# Patient Record
Sex: MALE | Race: WHITE | NOT HISPANIC OR LATINO | Employment: FULL TIME | URBAN - METROPOLITAN AREA
[De-identification: names, ages, dates, MRNs, and addresses within clinical notes are randomized per-mention and may not be internally consistent; named-entity substitution may affect disease eponyms.]

---

## 2017-02-22 ENCOUNTER — REMOTE DEVICE CHECK (OUTPATIENT)
Dept: ELECTROPHYSIOLOGY | Age: 52
End: 2017-02-22

## 2017-02-22 DIAGNOSIS — I49.5 SINOATRIAL NODE DYSFUNCTION (CMD): Primary | ICD-10-CM

## 2017-02-22 PROCEDURE — 93296 REM INTERROG EVL PM/IDS: CPT | Performed by: INTERNAL MEDICINE

## 2017-02-22 PROCEDURE — 93294 REM INTERROG EVL PM/LDLS PM: CPT | Performed by: INTERNAL MEDICINE

## 2017-05-18 DIAGNOSIS — I49.5 SINOATRIAL NODE DYSFUNCTION (CMD): Primary | ICD-10-CM

## 2017-05-23 ENCOUNTER — OFFICE VISIT (OUTPATIENT)
Dept: CARDIOLOGY | Age: 52
End: 2017-05-23

## 2017-05-23 VITALS
WEIGHT: 195 LBS | DIASTOLIC BLOOD PRESSURE: 80 MMHG | HEART RATE: 65 BPM | SYSTOLIC BLOOD PRESSURE: 118 MMHG | HEIGHT: 66 IN | BODY MASS INDEX: 31.34 KG/M2

## 2017-05-23 DIAGNOSIS — I47.10 SVT (SUPRAVENTRICULAR TACHYCARDIA): ICD-10-CM

## 2017-05-23 DIAGNOSIS — I49.5 SINOATRIAL NODE DYSFUNCTION (CMD): Primary | ICD-10-CM

## 2017-05-23 DIAGNOSIS — R55 NEAR SYNCOPE: ICD-10-CM

## 2017-05-23 DIAGNOSIS — Z95.0 PACEMAKER: ICD-10-CM

## 2017-05-23 PROCEDURE — 93280 PM DEVICE PROGR EVAL DUAL: CPT | Performed by: INTERNAL MEDICINE

## 2017-05-23 PROCEDURE — 99213 OFFICE O/P EST LOW 20 MIN: CPT | Performed by: INTERNAL MEDICINE

## 2017-05-31 RX ORDER — ZOLPIDEM TARTRATE 10 MG/1
TABLET ORAL
Qty: 30 TABLET | Refills: 0 | Status: SHIPPED | OUTPATIENT
Start: 2017-05-31 | End: 2017-09-15 | Stop reason: SDUPTHER

## 2017-08-22 ENCOUNTER — REMOTE DEVICE CHECK (OUTPATIENT)
Dept: ELECTROPHYSIOLOGY | Age: 52
End: 2017-08-22

## 2017-08-22 DIAGNOSIS — I49.5 SINOATRIAL NODE DYSFUNCTION (CMD): Primary | ICD-10-CM

## 2017-08-22 PROCEDURE — 93294 REM INTERROG EVL PM/LDLS PM: CPT | Performed by: INTERNAL MEDICINE

## 2017-08-22 PROCEDURE — 93296 REM INTERROG EVL PM/IDS: CPT | Performed by: INTERNAL MEDICINE

## 2017-09-13 ENCOUNTER — REMOTE DEVICE CHECK (OUTPATIENT)
Dept: ELECTROPHYSIOLOGY | Age: 52
End: 2017-09-13

## 2017-09-13 ENCOUNTER — TELEPHONE (OUTPATIENT)
Dept: ELECTROPHYSIOLOGY | Age: 52
End: 2017-09-13

## 2017-09-13 DIAGNOSIS — I49.5 SINOATRIAL NODE DYSFUNCTION (CMD): ICD-10-CM

## 2017-09-13 PROCEDURE — 93296 REM INTERROG EVL PM/IDS: CPT | Performed by: INTERNAL MEDICINE

## 2017-09-13 PROCEDURE — 93294 REM INTERROG EVL PM/LDLS PM: CPT | Performed by: INTERNAL MEDICINE

## 2017-09-15 ENCOUNTER — OFFICE VISIT (OUTPATIENT)
Dept: FAMILY MEDICINE | Age: 52
End: 2017-09-15

## 2017-09-15 ENCOUNTER — LAB SERVICES (OUTPATIENT)
Dept: LAB | Age: 52
End: 2017-09-15

## 2017-09-15 VITALS
TEMPERATURE: 97.7 F | BODY MASS INDEX: 32.14 KG/M2 | WEIGHT: 200 LBS | HEIGHT: 66 IN | RESPIRATION RATE: 16 BRPM | HEART RATE: 64 BPM | DIASTOLIC BLOOD PRESSURE: 82 MMHG | SYSTOLIC BLOOD PRESSURE: 124 MMHG

## 2017-09-15 DIAGNOSIS — R55 NEAR SYNCOPE: ICD-10-CM

## 2017-09-15 DIAGNOSIS — R55 SYNCOPE AND COLLAPSE: ICD-10-CM

## 2017-09-15 DIAGNOSIS — Z12.5 PROSTATE CANCER SCREENING: ICD-10-CM

## 2017-09-15 DIAGNOSIS — K64.4 HEMORRHOIDS, EXTERNAL WITHOUT COMPLICATIONS: ICD-10-CM

## 2017-09-15 DIAGNOSIS — I49.5 SINOATRIAL NODE DYSFUNCTION (CMD): ICD-10-CM

## 2017-09-15 DIAGNOSIS — F51.01 PRIMARY INSOMNIA: ICD-10-CM

## 2017-09-15 DIAGNOSIS — I47.10 SVT (SUPRAVENTRICULAR TACHYCARDIA): ICD-10-CM

## 2017-09-15 DIAGNOSIS — Z00.00 GENERAL MEDICAL EXAMINATION: Primary | ICD-10-CM

## 2017-09-15 LAB
ALBUMIN SERPL-MCNC: 3.8 G/DL (ref 3.6–5.1)
ALBUMIN/GLOB SERPL: 1.2 {RATIO} (ref 1–2.4)
ALP SERPL-CCNC: 97 UNITS/L (ref 45–117)
ALT SERPL-CCNC: 57 UNITS/L
ANION GAP SERPL CALC-SCNC: 11 MMOL/L (ref 10–20)
AST SERPL-CCNC: 35 UNITS/L
BILIRUB SERPL-MCNC: 1.3 MG/DL (ref 0.2–1)
BUN SERPL-MCNC: 13 MG/DL (ref 6–20)
BUN/CREAT SERPL: 13 (ref 7–25)
CALCIUM SERPL-MCNC: 8.9 MG/DL (ref 8.4–10.2)
CHLORIDE SERPL-SCNC: 102 MMOL/L (ref 98–107)
CHOLEST SERPL-MCNC: 223 MG/DL
CHOLEST/HDLC SERPL: 5.2 {RATIO}
CO2 SERPL-SCNC: 26 MMOL/L (ref 21–32)
CREAT SERPL-MCNC: 0.96 MG/DL (ref 0.67–1.17)
FASTING STATUS PATIENT QL REPORTED: 12 HRS
GLOBULIN SER-MCNC: 3.3 G/DL (ref 2–4)
GLUCOSE SERPL-MCNC: 95 MG/DL (ref 65–99)
HDLC SERPL-MCNC: 43 MG/DL
LDLC SERPL-MCNC: 147 MG/DL
LENGTH OF FAST TIME PATIENT: 12 HRS
NONHDLC SERPL-MCNC: 180 MG/DL
POTASSIUM SERPL-SCNC: 4.1 MMOL/L (ref 3.4–5.1)
PROT SERPL-MCNC: 7.1 G/DL (ref 6.4–8.2)
PSA SERPL-MCNC: 2.4 NG/ML
SODIUM SERPL-SCNC: 135 MMOL/L (ref 135–145)
TRIGL SERPL-MCNC: 166 MG/DL

## 2017-09-15 PROCEDURE — 36415 COLL VENOUS BLD VENIPUNCTURE: CPT | Performed by: INTERNAL MEDICINE

## 2017-09-15 PROCEDURE — 99396 PREV VISIT EST AGE 40-64: CPT | Performed by: PHYSICIAN ASSISTANT

## 2017-09-15 PROCEDURE — 80061 LIPID PANEL: CPT | Performed by: INTERNAL MEDICINE

## 2017-09-15 PROCEDURE — 85025 COMPLETE CBC W/AUTO DIFF WBC: CPT | Performed by: INTERNAL MEDICINE

## 2017-09-15 PROCEDURE — 84153 ASSAY OF PSA TOTAL: CPT | Performed by: INTERNAL MEDICINE

## 2017-09-15 PROCEDURE — 80053 COMPREHEN METABOLIC PANEL: CPT | Performed by: INTERNAL MEDICINE

## 2017-09-15 RX ORDER — METOPROLOL SUCCINATE 25 MG/1
25 TABLET, EXTENDED RELEASE ORAL DAILY
Qty: 90 TABLET | Refills: 3 | Status: SHIPPED | OUTPATIENT
Start: 2017-09-15 | End: 2018-03-22 | Stop reason: SDUPTHER

## 2017-09-15 RX ORDER — SILDENAFIL 100 MG/1
100 TABLET, FILM COATED ORAL PRN
Qty: 30 TABLET | Refills: 5 | Status: SHIPPED | OUTPATIENT
Start: 2017-09-15 | End: 2018-09-24 | Stop reason: SDUPTHER

## 2017-09-15 RX ORDER — ZOLPIDEM TARTRATE 10 MG/1
10 TABLET ORAL NIGHTLY
Qty: 30 TABLET | Refills: 5 | Status: SHIPPED | OUTPATIENT
Start: 2017-09-15 | End: 2018-06-23 | Stop reason: SDUPTHER

## 2017-09-15 RX ORDER — CARISOPRODOL 350 MG/1
TABLET ORAL
Qty: 60 TABLET | Refills: 3 | Status: SHIPPED | OUTPATIENT
Start: 2017-09-15 | End: 2018-01-24 | Stop reason: SDUPTHER

## 2017-09-15 RX ORDER — FLUTICASONE PROPIONATE 50 MCG
2 SPRAY, SUSPENSION (ML) NASAL DAILY
Qty: 16 G | Refills: 12 | Status: SHIPPED | OUTPATIENT
Start: 2017-09-15 | End: 2020-06-16 | Stop reason: ALTCHOICE

## 2017-09-15 ASSESSMENT — ENCOUNTER SYMPTOMS
CHILLS: 0
DIAPHORESIS: 0
DIZZINESS: 0
FEVER: 0
SHORTNESS OF BREATH: 0
DIARRHEA: 0
SLEEP DISTURBANCE: 0
WHEEZING: 0
VOMITING: 0
HEADACHES: 1
COUGH: 0
RHINORRHEA: 1
SINUS PRESSURE: 0
SORE THROAT: 0
PHOTOPHOBIA: 0
NAUSEA: 0
FATIGUE: 0

## 2017-09-16 LAB
BASOPHILS # BLD AUTO: 0 K/MCL (ref 0–0.3)
BASOPHILS NFR BLD AUTO: 1 %
DIFFERENTIAL METHOD BLD: NORMAL
EOSINOPHIL # BLD AUTO: 0.1 K/MCL (ref 0.1–0.5)
EOSINOPHIL NFR SPEC: 1 %
ERYTHROCYTE [DISTWIDTH] IN BLOOD: 13.1 % (ref 11–15)
HCT VFR BLD CALC: 48.6 % (ref 39–51)
HGB BLD-MCNC: 16.5 G/DL (ref 13–17)
LYMPHOCYTES # BLD MANUAL: 1.4 K/MCL (ref 1–4)
LYMPHOCYTES NFR BLD MANUAL: 24 %
MCH RBC QN AUTO: 30.9 PG (ref 26–34)
MCHC RBC AUTO-ENTMCNC: 34 G/DL (ref 32–36.5)
MCV RBC AUTO: 91 FL (ref 78–100)
MONOCYTES # BLD MANUAL: 0.5 K/MCL (ref 0.3–0.9)
MONOCYTES NFR BLD MANUAL: 8 %
NEUTROPHILS # BLD: 3.8 K/MCL (ref 1.8–7.7)
NEUTROPHILS NFR BLD AUTO: 66 %
PLATELET # BLD: 216 K/MCL (ref 140–450)
RBC # BLD: 5.34 MIL/MCL (ref 4.5–5.9)
WBC # BLD: 5.7 K/MCL (ref 4.2–11)

## 2017-10-03 ENCOUNTER — OFFICE VISIT (OUTPATIENT)
Dept: FAMILY MEDICINE | Age: 52
End: 2017-10-03

## 2017-10-03 VITALS
HEART RATE: 64 BPM | HEIGHT: 66 IN | OXYGEN SATURATION: 97 % | RESPIRATION RATE: 20 BRPM | SYSTOLIC BLOOD PRESSURE: 112 MMHG | TEMPERATURE: 97.9 F | WEIGHT: 199.96 LBS | BODY MASS INDEX: 32.14 KG/M2 | DIASTOLIC BLOOD PRESSURE: 80 MMHG

## 2017-10-03 DIAGNOSIS — R05.9 COUGH: ICD-10-CM

## 2017-10-03 DIAGNOSIS — J02.9 PHARYNGITIS, UNSPECIFIED ETIOLOGY: Primary | ICD-10-CM

## 2017-10-03 LAB — S PYO AG THROAT QL IA.RAPID: NEGATIVE

## 2017-10-03 PROCEDURE — 99213 OFFICE O/P EST LOW 20 MIN: CPT | Performed by: PHYSICIAN ASSISTANT

## 2017-10-03 PROCEDURE — 87880 STREP A ASSAY W/OPTIC: CPT | Performed by: PHYSICIAN ASSISTANT

## 2017-10-03 RX ORDER — AZITHROMYCIN 250 MG/1
TABLET, FILM COATED ORAL
Qty: 6 TABLET | Refills: 0 | Status: SHIPPED | OUTPATIENT
Start: 2017-10-03 | End: 2017-10-08

## 2017-10-03 RX ORDER — CODEINE PHOSPHATE AND GUAIFENESIN 10; 100 MG/5ML; MG/5ML
SOLUTION ORAL
Qty: 240 ML | Refills: 0 | Status: SHIPPED | OUTPATIENT
Start: 2017-10-03 | End: 2018-04-01

## 2017-10-03 ASSESSMENT — ENCOUNTER SYMPTOMS
COUGH: 1
SHORTNESS OF BREATH: 0
RHINORRHEA: 1
CHILLS: 0
WHEEZING: 0
FEVER: 0
DIAPHORESIS: 0
SORE THROAT: 1
SINUS PAIN: 1
FATIGUE: 0

## 2017-11-28 ENCOUNTER — OFFICE VISIT (OUTPATIENT)
Dept: CARDIOLOGY | Age: 52
End: 2017-11-28

## 2017-11-28 VITALS
SYSTOLIC BLOOD PRESSURE: 124 MMHG | BODY MASS INDEX: 32.14 KG/M2 | DIASTOLIC BLOOD PRESSURE: 68 MMHG | WEIGHT: 200 LBS | HEIGHT: 66 IN | HEART RATE: 74 BPM

## 2017-11-28 DIAGNOSIS — I47.10 SVT (SUPRAVENTRICULAR TACHYCARDIA): ICD-10-CM

## 2017-11-28 DIAGNOSIS — I49.5 SINOATRIAL NODE DYSFUNCTION (CMD): Primary | ICD-10-CM

## 2017-11-28 DIAGNOSIS — Z95.0 PACEMAKER: ICD-10-CM

## 2017-11-28 DIAGNOSIS — R55 NEAR SYNCOPE: ICD-10-CM

## 2017-11-28 PROCEDURE — 93280 PM DEVICE PROGR EVAL DUAL: CPT | Performed by: INTERNAL MEDICINE

## 2017-11-28 PROCEDURE — 99213 OFFICE O/P EST LOW 20 MIN: CPT | Performed by: INTERNAL MEDICINE

## 2017-11-29 DIAGNOSIS — R55 NEAR SYNCOPE: ICD-10-CM

## 2017-11-29 RX ORDER — METOPROLOL SUCCINATE 25 MG/1
25 TABLET, EXTENDED RELEASE ORAL DAILY
Qty: 90 TABLET | Refills: 1 | Status: SHIPPED | OUTPATIENT
Start: 2017-11-29 | End: 2019-02-12 | Stop reason: SDUPTHER

## 2018-01-01 ENCOUNTER — EXTERNAL RECORD (OUTPATIENT)
Dept: OTHER | Age: 53
End: 2018-01-01

## 2018-01-25 RX ORDER — CARISOPRODOL 350 MG/1
TABLET ORAL
Qty: 60 TABLET | Refills: 0 | Status: SHIPPED | OUTPATIENT
Start: 2018-01-25 | End: 2018-09-24 | Stop reason: SDUPTHER

## 2018-03-02 ENCOUNTER — REMOTE DEVICE CHECK (OUTPATIENT)
Dept: ELECTROPHYSIOLOGY | Age: 53
End: 2018-03-02

## 2018-03-02 DIAGNOSIS — I49.5 SINOATRIAL NODE DYSFUNCTION (CMD): ICD-10-CM

## 2018-03-02 PROCEDURE — 93296 REM INTERROG EVL PM/IDS: CPT | Performed by: INTERNAL MEDICINE

## 2018-03-02 PROCEDURE — 93294 REM INTERROG EVL PM/LDLS PM: CPT | Performed by: INTERNAL MEDICINE

## 2018-03-22 ENCOUNTER — OFFICE VISIT (OUTPATIENT)
Dept: FAMILY MEDICINE | Age: 53
End: 2018-03-22

## 2018-03-22 VITALS
TEMPERATURE: 97.4 F | BODY MASS INDEX: 31.53 KG/M2 | RESPIRATION RATE: 16 BRPM | HEIGHT: 66 IN | SYSTOLIC BLOOD PRESSURE: 126 MMHG | HEART RATE: 76 BPM | DIASTOLIC BLOOD PRESSURE: 80 MMHG | WEIGHT: 196.21 LBS

## 2018-03-22 DIAGNOSIS — I49.5 SINOATRIAL NODE DYSFUNCTION (CMD): ICD-10-CM

## 2018-03-22 DIAGNOSIS — J01.00 ACUTE MAXILLARY SINUSITIS, RECURRENCE NOT SPECIFIED: Primary | ICD-10-CM

## 2018-03-22 DIAGNOSIS — I47.10 SVT (SUPRAVENTRICULAR TACHYCARDIA): ICD-10-CM

## 2018-03-22 DIAGNOSIS — G47.33 OSA ON CPAP: ICD-10-CM

## 2018-03-22 DIAGNOSIS — Z95.0 PACEMAKER: ICD-10-CM

## 2018-03-22 DIAGNOSIS — M79.2 NEURITIS: ICD-10-CM

## 2018-03-22 PROCEDURE — 99213 OFFICE O/P EST LOW 20 MIN: CPT | Performed by: PHYSICIAN ASSISTANT

## 2018-03-22 RX ORDER — CEFDINIR 300 MG/1
300 CAPSULE ORAL 2 TIMES DAILY
Qty: 20 CAPSULE | Refills: 0 | Status: SHIPPED | OUTPATIENT
Start: 2018-03-22 | End: 2018-09-24 | Stop reason: ALTCHOICE

## 2018-03-22 ASSESSMENT — ENCOUNTER SYMPTOMS
WHEEZING: 0
DIAPHORESIS: 0
HEADACHES: 1
SHORTNESS OF BREATH: 0
SORE THROAT: 1
SINUS PRESSURE: 1
RHINORRHEA: 1
FEVER: 0
CHILLS: 0
BACK PAIN: 1
FATIGUE: 1
SINUS PAIN: 1
COUGH: 1
NUMBNESS: 1

## 2018-03-22 ASSESSMENT — PATIENT HEALTH QUESTIONNAIRE - PHQ9
SUM OF ALL RESPONSES TO PHQ9 QUESTIONS 1 AND 2: 0
CLINICAL INTERPRETATION OF PHQ2 SCORE: 0

## 2018-04-04 RX ORDER — FLUTICASONE PROPIONATE 50 MCG
SPRAY, SUSPENSION (ML) NASAL
Qty: 16 ML | Refills: 3 | Status: SHIPPED | OUTPATIENT
Start: 2018-04-04 | End: 2018-09-24 | Stop reason: SDUPTHER

## 2018-04-19 ENCOUNTER — OFFICE VISIT (OUTPATIENT)
Dept: PULMONOLOGY | Age: 53
End: 2018-04-19
Attending: PHYSICIAN ASSISTANT

## 2018-04-19 VITALS
RESPIRATION RATE: 16 BRPM | OXYGEN SATURATION: 100 % | SYSTOLIC BLOOD PRESSURE: 126 MMHG | HEIGHT: 65 IN | HEART RATE: 70 BPM | BODY MASS INDEX: 33.17 KG/M2 | DIASTOLIC BLOOD PRESSURE: 80 MMHG | WEIGHT: 199.08 LBS

## 2018-04-19 DIAGNOSIS — G47.31 COMPLEX SLEEP APNEA SYNDROME: Primary | ICD-10-CM

## 2018-04-19 PROBLEM — G47.39 COMPLEX SLEEP APNEA SYNDROME: Status: ACTIVE | Noted: 2018-04-19

## 2018-04-19 PROCEDURE — 99244 OFF/OP CNSLTJ NEW/EST MOD 40: CPT | Performed by: INTERNAL MEDICINE

## 2018-05-08 ENCOUNTER — HOSPITAL ENCOUNTER (OUTPATIENT)
Dept: CARDIOLOGY | Age: 53
Discharge: HOME | End: 2018-05-08
Attending: INTERNAL MEDICINE

## 2018-05-08 DIAGNOSIS — G47.31 COMPLEX SLEEP APNEA SYNDROME: ICD-10-CM

## 2018-05-08 LAB
AORTIC VALVE AREA: 3 CM2
ASCENDING AORTA (AAD): 2.8 CM
AV MEAN GRADIENT (AVMG): 2.4 MMHG
AV PEAK GRADIENT (AVPG): 6.2 MMHG
AV PEAK VELOCITY (AVPV): 1.2 M/S
DOP CALC LVOT PEAK VEL (LVOTPV): 1.2 M/S
E WAVE DECELARATION TIME (MDT): 177.4 MS
INTERVENTRICULAR SEPTUM IN END DIASTOLE (IVSD): 1.2 CM
LEFT INTERNAL DIMENSION IN SYSTOLE (LVSD): 2.8 CM
LEFT VENTRICULAR INTERNAL DIMENSION IN DIASTOLE (LVDD): 4.3 CM
LEFT VENTRICULAR POSTERIOR WALL IN END DIASTOLE (LVPW): 0.9 CM
LV EF: 61 PERCENT
LVOT 2D (LVOTD): 2 CM
LVOT VTI (LVOTVTI): 23.2 CM
MV E TISSUE VEL LAT (MELV): 9.4 CM/S
MV E TISSUE VEL MED (MESV): 6.3 CM/S
MV E WAVE VEL/E TISSUE VEL MED(MSR): 15.2
MV PEAK A VELOCITY (MVPAV): 0.8 M/S
MV PEAK E VELOCITY (MVPEV): 1 M/S
RV TISSUE DOPPLER FREE WALL HEART RATE (RVSTV): 0.1 M/S
TRICUSPID VALVE PEAK REGURGITATION VELOCITY (TRPV): 2.2 M/S

## 2018-05-08 PROCEDURE — 93306 TTE W/DOPPLER COMPLETE: CPT

## 2018-05-08 PROCEDURE — 93306 TTE W/DOPPLER COMPLETE: CPT | Performed by: INTERNAL MEDICINE

## 2018-05-15 ENCOUNTER — TELEPHONE (OUTPATIENT)
Dept: PULMONOLOGY | Age: 53
End: 2018-05-15

## 2018-05-29 ENCOUNTER — TELEPHONE (OUTPATIENT)
Dept: CARDIOLOGY | Age: 53
End: 2018-05-29

## 2018-05-29 ENCOUNTER — OFFICE VISIT (OUTPATIENT)
Dept: CARDIOLOGY | Age: 53
End: 2018-05-29

## 2018-05-29 VITALS
BODY MASS INDEX: 31.34 KG/M2 | HEIGHT: 66 IN | HEART RATE: 95 BPM | DIASTOLIC BLOOD PRESSURE: 80 MMHG | WEIGHT: 195 LBS | SYSTOLIC BLOOD PRESSURE: 128 MMHG

## 2018-05-29 DIAGNOSIS — I49.5 SINOATRIAL NODE DYSFUNCTION (CMD): Primary | ICD-10-CM

## 2018-05-29 DIAGNOSIS — R55 NEAR SYNCOPE: ICD-10-CM

## 2018-05-29 DIAGNOSIS — Z95.0 PACEMAKER: ICD-10-CM

## 2018-05-29 DIAGNOSIS — I47.10 SVT (SUPRAVENTRICULAR TACHYCARDIA): ICD-10-CM

## 2018-05-29 PROCEDURE — 93280 PM DEVICE PROGR EVAL DUAL: CPT | Performed by: INTERNAL MEDICINE

## 2018-06-08 ENCOUNTER — TELEPHONE (OUTPATIENT)
Dept: PULMONOLOGY | Age: 53
End: 2018-06-08

## 2018-06-08 ENCOUNTER — TELEPHONE (OUTPATIENT)
Dept: SLEEP MEDICINE | Age: 53
End: 2018-06-08

## 2018-06-22 ENCOUNTER — HOSPITAL ENCOUNTER (OUTPATIENT)
Dept: SLEEP MEDICINE | Age: 53
Discharge: STILL ADMITTED OR ADMINISTRATIVE | End: 2018-06-22
Attending: INTERNAL MEDICINE

## 2018-06-22 DIAGNOSIS — G47.31 COMPLEX SLEEP APNEA SYNDROME: ICD-10-CM

## 2018-06-22 PROCEDURE — 95811 POLYSOM 6/>YRS CPAP 4/> PARM: CPT

## 2018-06-22 PROCEDURE — 95811 POLYSOM 6/>YRS CPAP 4/> PARM: CPT | Performed by: INTERNAL MEDICINE

## 2018-06-23 VITALS — WEIGHT: 195 LBS | HEIGHT: 65 IN | BODY MASS INDEX: 32.49 KG/M2

## 2018-06-23 ASSESSMENT — SLEEP AND FATIGUE QUESTIONNAIRES
WHAT TIME DID YOU WAKE UP TODAY: 36000
DID YOU HAVE ANY PHYSICAL EXERCISE TODAY: NO
DID YOU DREAM LAST NIGHT: NO
HOW LIKELY ARE YOU TO NOD OFF OR FALL ASLEEP WHILE SITTING QUIETLY AFTER LUNCH WITHOUT ALCOHOL: SLIGHT CHANCE OF DOZING
WHEN DID YOU FEEL SLEEPY: 5 PM
DID YOU HAVE DIFFICULTY FALLING BACK TO SLEEP: YES
DID YOU TAKE ANY MEDICATIONS LAST NIGHT THAT MAKE YOU FEEL SLEEPY: YES
HOW LIKELY ARE YOU TO NOD OFF OR FALL ASLEEP WHILE SITTING INACTIVE IN A PUBLIC PLACE: SLIGHT CHANCE OF DOZING
DID YOU HAVE DIFFICULTY FALLING ASLEEP LAST NIGHT: NO
HOW DEEPLY DID YOU SLEEP LAST NIGHT: DEEP
HOW DOES YOUR SLEEP LAST NIGHT COMPARE TO YOUR USUAL SLEEP AT HOME: SAME AS
HOW LIKELY ARE YOU TO NOD OFF OR FALL ASLEEP WHEN YOU ARE A PASSENGER IN A CAR FOR AN HOUR WITHOUT A BREAK: SLIGHT CHANCE OF DOZING
DESCRIBE HOW YOU FEEL RIGHT NOW: FEELING ACTIVE AND VITAL, ALERT, WIDE-AWAKE
DO YOU HAVE ANY PHYSICAL COMPLAINTS RIGHT NOW: NO
HAVE YOU RECENTLY TAKEN ANY MEDICATIONS THAT MAKE YOU FEEL SLEEPY: NO
HOW LONG DID IT TAKE TO FALL ASLEEP LAST NIGHT (HRS/MIN): 2
DESCRIBE HOW YOU FEEL RIGHT NOW: FUNCTIONING AT A HIGH LEVEL, BUT NOT AT PEAK, ABLE TO CONCENTRATE
IS YOUR NOSE OR MOUTH DRY: NO
DO YOU HAVE A SOUR/BITTER TASTE IN YOUR MOUTH: YES
HOW MUCH SLEEP DID YOU HAVE LAST NIGHT (HRS/MIN): 5 HOURS
DID YOU DRINK ANY ALCOHOL TODAY: NO
HOW LIKELY ARE YOU TO NOD OFF OR FALL ASLEEP WHILE SITTING AND READING: MODERATE CHANCE OF DOZING
ESS TOTAL SCORE: 11
DID YOU HAVE ANY CAFFEINE TODAY: NO
HOW DOES THIS COMPARE TO YOUR USUAL AMOUNT OF SLEEP AT HOME: SAME AS
NUMBER OF TIMES YOU WOKE UP IN THE NIGHT: 5
HOW LONG DO YOU THINK YOU WERE ASLEEP (HRS/MIN): 6 HOURS
HOW SLEEPY DO YOU FEEL RIGHT NOW: A LITTLE
HOW LIKELY ARE YOU TO NOD OFF OR FALL ASLEEP WHILE SITTING AND TALKING TO SOMEONE: SLIGHT CHANCE OF DOZING
IF YOU WERE PLACED ON CPAP LAST NIGHT, HOW DID YOUR SLEEP DIFFER FROM YOUR USUAL NIGHTS SLEEP: BETTER
HAS TODAY BEEN AN UNUSUAL DAY IN ANY RESPECT: NO
DO YOU FEEL AWAKE AND ALERT ENOUGH TO DRIVE HOME: YES
HOW LIKELY ARE YOU TO NOD OFF OR FALL ASLEEP WHILE LYING DOWN TO REST IN THE AFTERNOON WHEN CIRCUMSTANCES PERMIT: HIGH CHANCE OF DOZING
IF YES, NAME OF MEDICATION: AMBIEN
DID YOU FEEL SLEEPY TODAY: YES
HOW LIKELY ARE YOU TO NOD OFF OR FALL ASLEEP IN A CAR, WHILE STOPPED FOR A FEW MINUTES IN TRAFFIC: WOULD NEVER DOZE
DID YOU TAKE A NAP TODAY: NO
DID YOU WAKE UP DURING THE NIGHT: YES
HOW LIKELY ARE YOU TO NOD OFF OR FALL ASLEEP WHILE WATCHING TV: MODERATE CHANCE OF DOZING

## 2018-06-25 ENCOUNTER — TELEPHONE (OUTPATIENT)
Dept: PULMONOLOGY | Age: 53
End: 2018-06-25

## 2018-06-25 DIAGNOSIS — G47.31 CENTRAL SLEEP APNEA: Primary | ICD-10-CM

## 2018-06-25 RX ORDER — ZOLPIDEM TARTRATE 10 MG/1
10 TABLET ORAL NIGHTLY
Qty: 30 TABLET | Refills: 2 | Status: SHIPPED | OUTPATIENT
Start: 2018-06-25 | End: 2018-09-24 | Stop reason: SDUPTHER

## 2018-08-06 ENCOUNTER — REMOTE DEVICE CHECK (OUTPATIENT)
Dept: ELECTROPHYSIOLOGY | Age: 53
End: 2018-08-06
Attending: INTERNAL MEDICINE

## 2018-08-06 DIAGNOSIS — I49.5 SINOATRIAL NODE DYSFUNCTION (CMD): ICD-10-CM

## 2018-08-06 PROCEDURE — 93296 REM INTERROG EVL PM/IDS: CPT | Performed by: INTERNAL MEDICINE

## 2018-08-06 PROCEDURE — 93294 REM INTERROG EVL PM/LDLS PM: CPT | Performed by: INTERNAL MEDICINE

## 2018-08-26 ENCOUNTER — HOSPITAL ENCOUNTER (OUTPATIENT)
Dept: SLEEP MEDICINE | Age: 53
Discharge: STILL ADMITTED OR ADMINISTRATIVE | End: 2018-08-26
Attending: INTERNAL MEDICINE

## 2018-08-26 DIAGNOSIS — G47.31 CENTRAL SLEEP APNEA: ICD-10-CM

## 2018-08-26 PROCEDURE — 95811 POLYSOM 6/>YRS CPAP 4/> PARM: CPT

## 2018-08-26 PROCEDURE — 95811 POLYSOM 6/>YRS CPAP 4/> PARM: CPT | Performed by: INTERNAL MEDICINE

## 2018-08-27 ENCOUNTER — TELEPHONE (OUTPATIENT)
Dept: PULMONOLOGY | Age: 53
End: 2018-08-27

## 2018-08-27 VITALS — HEIGHT: 65 IN | BODY MASS INDEX: 32.49 KG/M2 | WEIGHT: 195 LBS

## 2018-08-27 DIAGNOSIS — G47.31 COMPLEX SLEEP APNEA SYNDROME: Primary | ICD-10-CM

## 2018-08-27 ASSESSMENT — SLEEP AND FATIGUE QUESTIONNAIRES
HOW LIKELY ARE YOU TO NOD OFF OR FALL ASLEEP WHILE WATCHING TV: MODERATE CHANCE OF DOZING
DESCRIBE HOW YOU FEEL RIGHT NOW: FEELING ACTIVE AND VITAL, ALERT, WIDE-AWAKE
DO YOU FEEL AWAKE AND ALERT ENOUGH TO DRIVE HOME: YES
DID YOU DREAM LAST NIGHT: YES
HOW LONG DID IT TAKE TO FALL ASLEEP LAST NIGHT (HRS/MIN): 5
DO YOU HAVE A SOUR/BITTER TASTE IN YOUR MOUTH: NO
NUMBER OF TIMES YOU WOKE UP IN THE NIGHT: 4
DID YOU TAKE A NAP TODAY: NO
DID YOU WAKE UP DURING THE NIGHT: YES
HOW MUCH SLEEP DID YOU HAVE LAST NIGHT (HRS/MIN): 6 HOURS
HOW DEEPLY DID YOU SLEEP LAST NIGHT: AVERAGE
HOW SLEEPY DO YOU FEEL RIGHT NOW: A LITTLE
HOW LONG DO YOU THINK YOU WERE ASLEEP (HRS/MIN): 7 HOURS
HAS TODAY BEEN AN UNUSUAL DAY IN ANY RESPECT: NO
HOW DOES THIS COMPARE TO YOUR USUAL AMOUNT OF SLEEP AT HOME: LONGER THAN
DID YOU HAVE DIFFICULTY FALLING BACK TO SLEEP: NO
WHAT TIME DID YOU WAKE UP TODAY: 14400
HOW LIKELY ARE YOU TO NOD OFF OR FALL ASLEEP WHILE SITTING AND TALKING TO SOMEONE: SLIGHT CHANCE OF DOZING
HAVE YOU RECENTLY TAKEN ANY MEDICATIONS THAT MAKE YOU FEEL SLEEPY: YES
HOW LIKELY ARE YOU TO NOD OFF OR FALL ASLEEP WHILE SITTING AND READING: MODERATE CHANCE OF DOZING
DESCRIBE HOW YOU FEEL RIGHT NOW: FUNCTIONING AT A HIGH LEVEL, BUT NOT AT PEAK, ABLE TO CONCENTRATE
HOW LIKELY ARE YOU TO NOD OFF OR FALL ASLEEP WHEN YOU ARE A PASSENGER IN A CAR FOR AN HOUR WITHOUT A BREAK: SLIGHT CHANCE OF DOZING
HOW DOES YOUR SLEEP LAST NIGHT COMPARE TO YOUR USUAL SLEEP AT HOME: BETTER THAN
DID YOU FEEL SLEEPY TODAY: NO
HOW LIKELY ARE YOU TO NOD OFF OR FALL ASLEEP WHILE LYING DOWN TO REST IN THE AFTERNOON WHEN CIRCUMSTANCES PERMIT: HIGH CHANCE OF DOZING
DID YOU HAVE ANY PHYSICAL EXERCISE TODAY: NO
DID YOU HAVE DIFFICULTY FALLING ASLEEP LAST NIGHT: NO
DID YOU TAKE ANY MEDICATIONS LAST NIGHT THAT MAKE YOU FEEL SLEEPY: YES
DO YOU HAVE ANY PHYSICAL COMPLAINTS RIGHT NOW: NO
DID YOU HAVE ANY CAFFEINE TODAY: YES
IF YOU WERE PLACED ON CPAP LAST NIGHT, HOW DID YOUR SLEEP DIFFER FROM YOUR USUAL NIGHTS SLEEP: BETTER
IS YOUR NOSE OR MOUTH DRY: NO
HOW LIKELY ARE YOU TO NOD OFF OR FALL ASLEEP WHILE SITTING QUIETLY AFTER LUNCH WITHOUT ALCOHOL: MODERATE CHANCE OF DOZING
HOW LIKELY ARE YOU TO NOD OFF OR FALL ASLEEP WHILE SITTING INACTIVE IN A PUBLIC PLACE: MODERATE CHANCE OF DOZING
WHAT TIME DID YOU HAVE THE CAFFEINE: 16200
ESS TOTAL SCORE: 13
HOW LIKELY ARE YOU TO NOD OFF OR FALL ASLEEP IN A CAR, WHILE STOPPED FOR A FEW MINUTES IN TRAFFIC: WOULD NEVER DOZE
DID YOU DRINK ANY ALCOHOL TODAY: NO
HOW MUCH CAFFEINE DID YOU HAVE: 6 OZ

## 2018-08-27 ASSESSMENT — ACTIVITIES OF DAILY LIVING (ADL)
ADL_SCORE: 12
ADL_BEFORE_ADMISSION: INDEPENDENT

## 2018-08-28 ENCOUNTER — TELEPHONE (OUTPATIENT)
Dept: PULMONOLOGY | Age: 53
End: 2018-08-28

## 2018-08-28 DIAGNOSIS — G47.31 COMPLEX SLEEP APNEA SYNDROME: Primary | ICD-10-CM

## 2018-09-24 ENCOUNTER — OFFICE VISIT (OUTPATIENT)
Dept: FAMILY MEDICINE | Age: 53
End: 2018-09-24

## 2018-09-24 VITALS
RESPIRATION RATE: 16 BRPM | BODY MASS INDEX: 33.06 KG/M2 | DIASTOLIC BLOOD PRESSURE: 80 MMHG | HEART RATE: 76 BPM | HEIGHT: 65 IN | WEIGHT: 198.41 LBS | SYSTOLIC BLOOD PRESSURE: 128 MMHG

## 2018-09-24 DIAGNOSIS — J01.00 ACUTE MAXILLARY SINUSITIS, RECURRENCE NOT SPECIFIED: Primary | ICD-10-CM

## 2018-09-24 DIAGNOSIS — Z95.0 PACEMAKER: ICD-10-CM

## 2018-09-24 DIAGNOSIS — F51.04 CHRONIC INSOMNIA: ICD-10-CM

## 2018-09-24 DIAGNOSIS — I47.10 SVT (SUPRAVENTRICULAR TACHYCARDIA): ICD-10-CM

## 2018-09-24 DIAGNOSIS — G47.31 COMPLEX SLEEP APNEA SYNDROME: ICD-10-CM

## 2018-09-24 DIAGNOSIS — I49.5 SINOATRIAL NODE DYSFUNCTION (CMD): ICD-10-CM

## 2018-09-24 PROCEDURE — 99213 OFFICE O/P EST LOW 20 MIN: CPT | Performed by: PHYSICIAN ASSISTANT

## 2018-09-24 RX ORDER — CEFDINIR 300 MG/1
300 CAPSULE ORAL 2 TIMES DAILY
Qty: 20 CAPSULE | Refills: 0 | Status: SHIPPED | OUTPATIENT
Start: 2018-09-24 | End: 2018-11-27

## 2018-09-24 RX ORDER — FLUTICASONE PROPIONATE 50 MCG
2 SPRAY, SUSPENSION (ML) NASAL DAILY
Qty: 16 ML | Refills: 3 | Status: SHIPPED | OUTPATIENT
Start: 2018-09-24 | End: 2019-03-13 | Stop reason: SDUPTHER

## 2018-09-24 RX ORDER — SILDENAFIL 100 MG/1
100 TABLET, FILM COATED ORAL PRN
Qty: 30 TABLET | Refills: 5 | Status: SHIPPED | OUTPATIENT
Start: 2018-09-24 | End: 2019-12-10 | Stop reason: SDUPTHER

## 2018-09-24 RX ORDER — ZOLPIDEM TARTRATE 10 MG/1
10 TABLET ORAL NIGHTLY
Qty: 30 TABLET | Refills: 5 | Status: SHIPPED | OUTPATIENT
Start: 2018-09-24 | End: 2019-05-03 | Stop reason: SDUPTHER

## 2018-09-24 RX ORDER — CARISOPRODOL 350 MG/1
350 TABLET ORAL 3 TIMES DAILY PRN
Qty: 60 TABLET | Refills: 3 | Status: SHIPPED | OUTPATIENT
Start: 2018-09-24 | End: 2019-12-10 | Stop reason: SDUPTHER

## 2018-09-24 ASSESSMENT — ENCOUNTER SYMPTOMS
CHILLS: 0
DIAPHORESIS: 0
SINUS PRESSURE: 1
SORE THROAT: 0
FATIGUE: 0
HEADACHES: 0
WHEEZING: 0
FEVER: 0
COUGH: 0
RHINORRHEA: 1

## 2018-11-19 ENCOUNTER — OFFICE VISIT (OUTPATIENT)
Dept: PULMONOLOGY | Age: 53
End: 2018-11-19

## 2018-11-19 VITALS
OXYGEN SATURATION: 98 % | DIASTOLIC BLOOD PRESSURE: 78 MMHG | RESPIRATION RATE: 16 BRPM | SYSTOLIC BLOOD PRESSURE: 130 MMHG | HEIGHT: 65 IN | HEART RATE: 70 BPM | BODY MASS INDEX: 33.68 KG/M2 | WEIGHT: 202.16 LBS

## 2018-11-19 DIAGNOSIS — G47.31 COMPLEX SLEEP APNEA SYNDROME: Primary | ICD-10-CM

## 2018-11-19 PROCEDURE — 99213 OFFICE O/P EST LOW 20 MIN: CPT | Performed by: INTERNAL MEDICINE

## 2018-11-27 ENCOUNTER — OFFICE VISIT (OUTPATIENT)
Dept: CARDIOLOGY | Age: 53
End: 2018-11-27

## 2018-11-27 VITALS
HEIGHT: 65 IN | HEART RATE: 70 BPM | WEIGHT: 200 LBS | SYSTOLIC BLOOD PRESSURE: 120 MMHG | DIASTOLIC BLOOD PRESSURE: 78 MMHG | BODY MASS INDEX: 33.32 KG/M2

## 2018-11-27 DIAGNOSIS — I49.5 SINOATRIAL NODE DYSFUNCTION (CMD): Primary | ICD-10-CM

## 2018-11-27 DIAGNOSIS — Z95.0 PACEMAKER: ICD-10-CM

## 2018-11-27 PROCEDURE — 93280 PM DEVICE PROGR EVAL DUAL: CPT | Performed by: INTERNAL MEDICINE

## 2019-01-01 ENCOUNTER — EXTERNAL RECORD (OUTPATIENT)
Dept: OTHER | Age: 54
End: 2019-01-01

## 2019-01-10 ENCOUNTER — HOSPITAL ENCOUNTER (OUTPATIENT)
Dept: OTHER | Facility: HOSPITAL | Age: 54
Discharge: HOME | End: 2019-01-10
Attending: FAMILY MEDICINE

## 2019-01-10 ENCOUNTER — OFFICE VISIT CONVERTED (OUTPATIENT)
Dept: FAMILY MEDICINE CLINIC | Age: 54
End: 2019-01-10
Attending: FAMILY MEDICINE

## 2019-01-10 LAB
ALBUMIN SERPL-MCNC: 4.8 G/DL (ref 3.5–5)
ALBUMIN/GLOB SERPL: 2 {RATIO} (ref 1.4–2.6)
ALP SERPL-CCNC: 77 U/L (ref 56–119)
ALT SERPL-CCNC: 46 U/L (ref 10–40)
ANION GAP SERPL CALC-SCNC: 24 MMOL/L (ref 8–19)
AST SERPL-CCNC: 36 U/L (ref 15–50)
BILIRUB SERPL-MCNC: 0.71 MG/DL (ref 0.2–1.3)
BUN SERPL-MCNC: 9 MG/DL (ref 5–25)
BUN/CREAT SERPL: 9 {RATIO} (ref 6–20)
CALCIUM SERPL-MCNC: 9.6 MG/DL (ref 8.7–10.4)
CHLORIDE SERPL-SCNC: 101 MMOL/L (ref 99–111)
CHOLEST SERPL-MCNC: 171 MG/DL (ref 107–200)
CHOLEST/HDLC SERPL: 4.4 {RATIO} (ref 3–6)
CONV CO2: 22 MMOL/L (ref 22–32)
CONV TOTAL PROTEIN: 7.2 G/DL (ref 6.3–8.2)
CREAT UR-MCNC: 0.99 MG/DL (ref 0.7–1.2)
EST. AVERAGE GLUCOSE BLD GHB EST-MCNC: 140 MG/DL
GFR SERPLBLD BASED ON 1.73 SQ M-ARVRAT: >60 ML/MIN/{1.73_M2}
GLOBULIN UR ELPH-MCNC: 2.4 G/DL (ref 2–3.5)
GLUCOSE SERPL-MCNC: 103 MG/DL (ref 70–99)
HBA1C MFR BLD: 6.5 % (ref 3.5–5.7)
HDLC SERPL-MCNC: 39 MG/DL (ref 40–60)
LDLC SERPL CALC-MCNC: 114 MG/DL (ref 70–100)
OSMOLALITY SERPL CALC.SUM OF ELEC: 293 MOSM/KG (ref 273–304)
POTASSIUM SERPL-SCNC: 4.5 MMOL/L (ref 3.5–5.3)
PSA SERPL-MCNC: 0.53 NG/ML (ref 0–4)
SODIUM SERPL-SCNC: 142 MMOL/L (ref 135–147)
TRIGL SERPL-MCNC: 91 MG/DL (ref 40–150)
TSH SERPL-ACNC: 1.96 M[IU]/L (ref 0.27–4.2)
VLDLC SERPL-MCNC: 18 MG/DL (ref 5–37)

## 2019-01-17 ENCOUNTER — HOSPITAL ENCOUNTER (OUTPATIENT)
Dept: OTHER | Facility: HOSPITAL | Age: 54
Discharge: HOME | End: 2019-01-17
Attending: FAMILY MEDICINE

## 2019-02-12 DIAGNOSIS — R55 NEAR SYNCOPE: ICD-10-CM

## 2019-02-15 RX ORDER — METOPROLOL SUCCINATE 25 MG/1
25 TABLET, EXTENDED RELEASE ORAL DAILY
Qty: 90 TABLET | Refills: 2 | OUTPATIENT
Start: 2019-02-15

## 2019-02-15 RX ORDER — METOPROLOL SUCCINATE 25 MG/1
TABLET, EXTENDED RELEASE ORAL
Qty: 90 TABLET | Refills: 0 | Status: SHIPPED | OUTPATIENT
Start: 2019-02-15 | End: 2019-05-08 | Stop reason: SDUPTHER

## 2019-02-26 ENCOUNTER — REMOTE DEVICE CHECK (OUTPATIENT)
Dept: ELECTROPHYSIOLOGY | Age: 54
End: 2019-02-26

## 2019-02-26 DIAGNOSIS — I49.5 SINOATRIAL NODE DYSFUNCTION (CMD): Primary | ICD-10-CM

## 2019-02-26 PROCEDURE — 93296 REM INTERROG EVL PM/IDS: CPT | Performed by: INTERNAL MEDICINE

## 2019-02-26 PROCEDURE — 93294 REM INTERROG EVL PM/LDLS PM: CPT | Performed by: INTERNAL MEDICINE

## 2019-02-27 ENCOUNTER — TELEPHONE (OUTPATIENT)
Dept: ELECTROPHYSIOLOGY | Age: 54
End: 2019-02-27

## 2019-03-15 RX ORDER — FLUTICASONE PROPIONATE 50 MCG
SPRAY, SUSPENSION (ML) NASAL
Qty: 16 ML | Refills: 3 | Status: SHIPPED | OUTPATIENT
Start: 2019-03-15 | End: 2019-12-10 | Stop reason: SDUPTHER

## 2019-05-03 ENCOUNTER — LAB SERVICES (OUTPATIENT)
Dept: LAB | Age: 54
End: 2019-05-03

## 2019-05-03 ENCOUNTER — OFFICE VISIT (OUTPATIENT)
Dept: FAMILY MEDICINE | Age: 54
End: 2019-05-03

## 2019-05-03 VITALS
BODY MASS INDEX: 33.65 KG/M2 | SYSTOLIC BLOOD PRESSURE: 122 MMHG | TEMPERATURE: 97.6 F | HEIGHT: 65 IN | DIASTOLIC BLOOD PRESSURE: 86 MMHG | RESPIRATION RATE: 18 BRPM | WEIGHT: 201.94 LBS | OXYGEN SATURATION: 96 % | HEART RATE: 61 BPM

## 2019-05-03 DIAGNOSIS — I49.5 SINOATRIAL NODE DYSFUNCTION (CMD): ICD-10-CM

## 2019-05-03 DIAGNOSIS — Z13.220 LIPID SCREENING: ICD-10-CM

## 2019-05-03 DIAGNOSIS — I47.10 SVT (SUPRAVENTRICULAR TACHYCARDIA): ICD-10-CM

## 2019-05-03 DIAGNOSIS — Z95.0 PACEMAKER: ICD-10-CM

## 2019-05-03 DIAGNOSIS — F51.01 PRIMARY INSOMNIA: ICD-10-CM

## 2019-05-03 DIAGNOSIS — G47.31 COMPLEX SLEEP APNEA SYNDROME: ICD-10-CM

## 2019-05-03 DIAGNOSIS — Z00.00 GENERAL MEDICAL EXAM: Primary | ICD-10-CM

## 2019-05-03 DIAGNOSIS — J30.2 SEASONAL ALLERGIES: ICD-10-CM

## 2019-05-03 DIAGNOSIS — C61 PROSTATE CANCER (CMD): ICD-10-CM

## 2019-05-03 PROCEDURE — 84153 ASSAY OF PSA TOTAL: CPT | Performed by: INTERNAL MEDICINE

## 2019-05-03 PROCEDURE — 99396 PREV VISIT EST AGE 40-64: CPT | Performed by: PHYSICIAN ASSISTANT

## 2019-05-03 PROCEDURE — 36415 COLL VENOUS BLD VENIPUNCTURE: CPT | Performed by: INTERNAL MEDICINE

## 2019-05-03 PROCEDURE — 80053 COMPREHEN METABOLIC PANEL: CPT | Performed by: INTERNAL MEDICINE

## 2019-05-03 PROCEDURE — 80061 LIPID PANEL: CPT | Performed by: INTERNAL MEDICINE

## 2019-05-03 PROCEDURE — 85025 COMPLETE CBC W/AUTO DIFF WBC: CPT | Performed by: INTERNAL MEDICINE

## 2019-05-03 RX ORDER — ZOLPIDEM TARTRATE 10 MG/1
10 TABLET ORAL NIGHTLY
Qty: 90 TABLET | Refills: 1 | Status: SHIPPED | OUTPATIENT
Start: 2019-05-03 | End: 2019-12-10 | Stop reason: SDUPTHER

## 2019-05-03 ASSESSMENT — ENCOUNTER SYMPTOMS
SINUS PRESSURE: 1
FEVER: 0
HEADACHES: 0
DIAPHORESIS: 0
CHILLS: 0
NAUSEA: 0
WHEEZING: 0
SHORTNESS OF BREATH: 0
BLOOD IN STOOL: 0
FATIGUE: 0
DIZZINESS: 0
COUGH: 0
SLEEP DISTURBANCE: 0
DIARRHEA: 0
VOMITING: 0

## 2019-05-03 ASSESSMENT — PATIENT HEALTH QUESTIONNAIRE - PHQ9
SUM OF ALL RESPONSES TO PHQ9 QUESTIONS 1 AND 2: 0
2. FEELING DOWN, DEPRESSED OR HOPELESS: NOT AT ALL
SUM OF ALL RESPONSES TO PHQ9 QUESTIONS 1 AND 2: 0
1. LITTLE INTEREST OR PLEASURE IN DOING THINGS: NOT AT ALL

## 2019-05-04 LAB
ALBUMIN SERPL-MCNC: 4 G/DL (ref 3.6–5.1)
ALBUMIN/GLOB SERPL: 1.3 {RATIO} (ref 1–2.4)
ALP SERPL-CCNC: 104 UNITS/L (ref 45–117)
ALT SERPL-CCNC: 39 UNITS/L
ANION GAP SERPL CALC-SCNC: 8 MMOL/L (ref 10–20)
AST SERPL-CCNC: 21 UNITS/L
BASOPHILS # BLD AUTO: 0 K/MCL (ref 0–0.3)
BASOPHILS NFR BLD AUTO: 1 %
BILIRUB SERPL-MCNC: 1.4 MG/DL (ref 0.2–1)
BUN SERPL-MCNC: 8 MG/DL (ref 6–20)
BUN/CREAT SERPL: 8 (ref 7–25)
CALCIUM SERPL-MCNC: 9.3 MG/DL (ref 8.4–10.2)
CHLORIDE SERPL-SCNC: 104 MMOL/L (ref 98–107)
CHOLEST SERPL-MCNC: 217 MG/DL
CHOLEST/HDLC SERPL: 4.6 {RATIO}
CO2 SERPL-SCNC: 31 MMOL/L (ref 21–32)
CREAT SERPL-MCNC: 0.95 MG/DL (ref 0.67–1.17)
DIFFERENTIAL METHOD BLD: ABNORMAL
EOSINOPHIL # BLD AUTO: 0.1 K/MCL (ref 0.1–0.5)
EOSINOPHIL NFR SPEC: 1 %
ERYTHROCYTE [DISTWIDTH] IN BLOOD: 13 % (ref 11–15)
FASTING STATUS PATIENT QL REPORTED: 14 HRS
GLOBULIN SER-MCNC: 3.1 G/DL (ref 2–4)
GLUCOSE SERPL-MCNC: 94 MG/DL (ref 65–99)
HCT VFR BLD CALC: 51.5 % (ref 39–51)
HDLC SERPL-MCNC: 47 MG/DL
HGB BLD-MCNC: 16.7 G/DL (ref 13–17)
IMM GRANULOCYTES # BLD AUTO: 0 K/MCL (ref 0–0.2)
IMM GRANULOCYTES NFR BLD: 1 %
LDLC SERPL-MCNC: 144 MG/DL
LENGTH OF FAST TIME PATIENT: 14 HRS
LYMPHOCYTES # BLD MANUAL: 1.1 K/MCL (ref 1–4)
LYMPHOCYTES NFR BLD MANUAL: 22 %
MCH RBC QN AUTO: 30.3 PG (ref 26–34)
MCHC RBC AUTO-ENTMCNC: 32.4 G/DL (ref 32–36.5)
MCV RBC AUTO: 93.3 FL (ref 78–100)
MONOCYTES # BLD MANUAL: 0.5 K/MCL (ref 0.3–0.9)
MONOCYTES NFR BLD MANUAL: 9 %
NEUTROPHILS # BLD: 3.5 K/MCL (ref 1.8–7.7)
NEUTROPHILS NFR BLD AUTO: 66 %
NONHDLC SERPL-MCNC: 170 MG/DL
NRBC BLD MANUAL-RTO: 0 /100 WBC
PLATELET # BLD: 232 K/MCL (ref 140–450)
POTASSIUM SERPL-SCNC: 4.4 MMOL/L (ref 3.4–5.1)
PROT SERPL-MCNC: 7.1 G/DL (ref 6.4–8.2)
PSA SERPL-MCNC: 2.3 NG/ML
RBC # BLD: 5.52 MIL/MCL (ref 4.5–5.9)
SODIUM SERPL-SCNC: 139 MMOL/L (ref 135–145)
TRIGL SERPL-MCNC: 128 MG/DL
WBC # BLD: 5.2 K/MCL (ref 4.2–11)

## 2019-05-07 ENCOUNTER — TELEPHONE (OUTPATIENT)
Dept: FAMILY MEDICINE | Age: 54
End: 2019-05-07

## 2019-05-08 DIAGNOSIS — R55 NEAR SYNCOPE: ICD-10-CM

## 2019-05-09 RX ORDER — METOPROLOL SUCCINATE 25 MG/1
TABLET, EXTENDED RELEASE ORAL
Qty: 90 TABLET | Refills: 1 | Status: SHIPPED | OUTPATIENT
Start: 2019-05-09 | End: 2019-11-09 | Stop reason: SDUPTHER

## 2019-05-28 ENCOUNTER — OFFICE VISIT (OUTPATIENT)
Dept: CARDIOLOGY | Age: 54
End: 2019-05-28

## 2019-05-28 ENCOUNTER — TELEPHONE (OUTPATIENT)
Dept: CARDIOLOGY | Age: 54
End: 2019-05-28

## 2019-05-28 VITALS
HEIGHT: 65 IN | DIASTOLIC BLOOD PRESSURE: 80 MMHG | BODY MASS INDEX: 32.99 KG/M2 | SYSTOLIC BLOOD PRESSURE: 128 MMHG | WEIGHT: 198 LBS | HEART RATE: 72 BPM

## 2019-05-28 DIAGNOSIS — I47.10 SVT (SUPRAVENTRICULAR TACHYCARDIA): ICD-10-CM

## 2019-05-28 DIAGNOSIS — I49.5 SINOATRIAL NODE DYSFUNCTION (CMD): Primary | ICD-10-CM

## 2019-05-28 DIAGNOSIS — R55 NEAR SYNCOPE: ICD-10-CM

## 2019-05-28 DIAGNOSIS — Z95.0 PACEMAKER: ICD-10-CM

## 2019-05-28 PROCEDURE — 93280 PM DEVICE PROGR EVAL DUAL: CPT | Performed by: INTERNAL MEDICINE

## 2019-06-11 ENCOUNTER — APPOINTMENT (OUTPATIENT)
Dept: CARDIOLOGY | Age: 54
End: 2019-06-11

## 2019-08-30 ENCOUNTER — TELEPHONE (OUTPATIENT)
Dept: ELECTROPHYSIOLOGY | Age: 54
End: 2019-08-30

## 2019-08-30 ENCOUNTER — APPOINTMENT (OUTPATIENT)
Dept: ELECTROPHYSIOLOGY | Age: 54
End: 2019-08-30

## 2019-09-04 ENCOUNTER — REMOTE DEVICE CHECK (OUTPATIENT)
Dept: ELECTROPHYSIOLOGY | Age: 54
End: 2019-09-04

## 2019-09-04 DIAGNOSIS — I49.5 SINOATRIAL NODE DYSFUNCTION (CMD): Primary | ICD-10-CM

## 2019-09-04 PROCEDURE — 93294 REM INTERROG EVL PM/LDLS PM: CPT | Performed by: INTERNAL MEDICINE

## 2019-09-04 PROCEDURE — 93296 REM INTERROG EVL PM/IDS: CPT | Performed by: INTERNAL MEDICINE

## 2019-09-10 ENCOUNTER — APPOINTMENT (OUTPATIENT)
Dept: ELECTROPHYSIOLOGY | Age: 54
End: 2019-09-10

## 2019-10-29 ENCOUNTER — OFFICE VISIT (OUTPATIENT)
Dept: PULMONOLOGY | Age: 54
End: 2019-10-29

## 2019-10-29 VITALS
HEIGHT: 65 IN | BODY MASS INDEX: 33.55 KG/M2 | RESPIRATION RATE: 16 BRPM | DIASTOLIC BLOOD PRESSURE: 80 MMHG | HEART RATE: 70 BPM | SYSTOLIC BLOOD PRESSURE: 124 MMHG | WEIGHT: 201.39 LBS | OXYGEN SATURATION: 98 %

## 2019-10-29 DIAGNOSIS — G47.31 COMPLEX SLEEP APNEA SYNDROME: Primary | ICD-10-CM

## 2019-10-29 DIAGNOSIS — F51.04 PSYCHOPHYSIOLOGICAL INSOMNIA: ICD-10-CM

## 2019-10-29 PROCEDURE — 99214 OFFICE O/P EST MOD 30 MIN: CPT | Performed by: INTERNAL MEDICINE

## 2019-10-29 RX ORDER — IPRATROPIUM BROMIDE 42 UG/1
SPRAY, METERED NASAL
COMMUNITY
Start: 2019-10-28 | End: 2020-06-16 | Stop reason: ALTCHOICE

## 2019-10-29 RX ORDER — ESZOPICLONE 2 MG/1
2 TABLET, FILM COATED ORAL
Qty: 30 TABLET | Refills: 0 | Status: SHIPPED | OUTPATIENT
Start: 2019-10-29 | End: 2019-12-10 | Stop reason: ALTCHOICE

## 2019-11-09 DIAGNOSIS — R55 NEAR SYNCOPE: ICD-10-CM

## 2019-11-11 RX ORDER — METOPROLOL SUCCINATE 25 MG/1
25 TABLET, EXTENDED RELEASE ORAL DAILY
Qty: 30 TABLET | Refills: 0 | Status: SHIPPED | OUTPATIENT
Start: 2019-11-11 | End: 2019-12-05 | Stop reason: SDUPTHER

## 2019-12-05 DIAGNOSIS — R55 NEAR SYNCOPE: ICD-10-CM

## 2019-12-05 RX ORDER — METOPROLOL SUCCINATE 25 MG/1
25 TABLET, EXTENDED RELEASE ORAL DAILY
Qty: 30 TABLET | Refills: 0 | Status: SHIPPED | OUTPATIENT
Start: 2019-12-05 | End: 2019-12-10 | Stop reason: SDUPTHER

## 2019-12-10 ENCOUNTER — OFFICE VISIT (OUTPATIENT)
Dept: FAMILY MEDICINE | Age: 54
End: 2019-12-10

## 2019-12-10 ENCOUNTER — LAB SERVICES (OUTPATIENT)
Dept: LAB | Age: 54
End: 2019-12-10

## 2019-12-10 VITALS
HEART RATE: 67 BPM | HEIGHT: 65 IN | OXYGEN SATURATION: 98 % | RESPIRATION RATE: 18 BRPM | SYSTOLIC BLOOD PRESSURE: 130 MMHG | BODY MASS INDEX: 33.51 KG/M2 | DIASTOLIC BLOOD PRESSURE: 84 MMHG | TEMPERATURE: 97.8 F

## 2019-12-10 DIAGNOSIS — F51.01 PRIMARY INSOMNIA: ICD-10-CM

## 2019-12-10 DIAGNOSIS — Z95.0 PACEMAKER: ICD-10-CM

## 2019-12-10 DIAGNOSIS — Z12.11 COLON CANCER SCREENING: Primary | ICD-10-CM

## 2019-12-10 DIAGNOSIS — R55 NEAR SYNCOPE: ICD-10-CM

## 2019-12-10 DIAGNOSIS — E78.5 HYPERLIPIDEMIA, UNSPECIFIED HYPERLIPIDEMIA TYPE: ICD-10-CM

## 2019-12-10 DIAGNOSIS — G47.31 COMPLEX SLEEP APNEA SYNDROME: ICD-10-CM

## 2019-12-10 DIAGNOSIS — J30.2 SEASONAL ALLERGIES: ICD-10-CM

## 2019-12-10 DIAGNOSIS — I49.5 SINOATRIAL NODE DYSFUNCTION (CMD): ICD-10-CM

## 2019-12-10 PROCEDURE — 80061 LIPID PANEL: CPT | Performed by: INTERNAL MEDICINE

## 2019-12-10 PROCEDURE — 80053 COMPREHEN METABOLIC PANEL: CPT | Performed by: INTERNAL MEDICINE

## 2019-12-10 PROCEDURE — 36415 COLL VENOUS BLD VENIPUNCTURE: CPT | Performed by: INTERNAL MEDICINE

## 2019-12-10 PROCEDURE — 99213 OFFICE O/P EST LOW 20 MIN: CPT | Performed by: PHYSICIAN ASSISTANT

## 2019-12-10 RX ORDER — ZOLPIDEM TARTRATE 10 MG/1
10 TABLET ORAL NIGHTLY
Qty: 90 TABLET | Refills: 1 | Status: SHIPPED | OUTPATIENT
Start: 2019-12-10 | End: 2020-06-16 | Stop reason: SDUPTHER

## 2019-12-10 RX ORDER — CARISOPRODOL 350 MG/1
350 TABLET ORAL 3 TIMES DAILY PRN
Qty: 60 TABLET | Refills: 3 | Status: SHIPPED | OUTPATIENT
Start: 2019-12-10 | End: 2020-06-16 | Stop reason: ALTCHOICE

## 2019-12-10 RX ORDER — ROSUVASTATIN CALCIUM 10 MG/1
10 TABLET, COATED ORAL DAILY
Qty: 90 TABLET | Refills: 3 | Status: SHIPPED | OUTPATIENT
Start: 2019-12-10 | End: 2020-06-16 | Stop reason: SINTOL

## 2019-12-10 RX ORDER — FLUTICASONE PROPIONATE 50 MCG
2 SPRAY, SUSPENSION (ML) NASAL DAILY
Qty: 16 ML | Refills: 3 | Status: SHIPPED | OUTPATIENT
Start: 2019-12-10 | End: 2020-10-29

## 2019-12-10 RX ORDER — METOPROLOL SUCCINATE 25 MG/1
25 TABLET, EXTENDED RELEASE ORAL DAILY
Qty: 90 TABLET | Refills: 3 | Status: SHIPPED | OUTPATIENT
Start: 2019-12-10 | End: 2020-06-16

## 2019-12-10 RX ORDER — SILDENAFIL 100 MG/1
100 TABLET, FILM COATED ORAL PRN
Qty: 30 TABLET | Refills: 5 | Status: CANCELLED | OUTPATIENT
Start: 2019-12-10

## 2019-12-10 RX ORDER — SILDENAFIL 100 MG/1
100 TABLET, FILM COATED ORAL PRN
Qty: 30 TABLET | Refills: 5 | Status: SHIPPED | OUTPATIENT
Start: 2019-12-10 | End: 2020-06-16 | Stop reason: ALTCHOICE

## 2019-12-10 ASSESSMENT — ENCOUNTER SYMPTOMS
BACK PAIN: 1
CHILLS: 0
COUGH: 0
FATIGUE: 0
VOMITING: 0
BLOOD IN STOOL: 0
SLEEP DISTURBANCE: 1
DIARRHEA: 0
WHEEZING: 0
DIAPHORESIS: 0
HEADACHES: 0
DIZZINESS: 0
SHORTNESS OF BREATH: 0

## 2019-12-11 LAB
ALBUMIN SERPL-MCNC: 4 G/DL (ref 3.6–5.1)
ALBUMIN/GLOB SERPL: 1.3 {RATIO} (ref 1–2.4)
ALP SERPL-CCNC: 109 UNITS/L (ref 45–117)
ALT SERPL-CCNC: 59 UNITS/L
ANION GAP SERPL CALC-SCNC: 11 MMOL/L (ref 10–20)
AST SERPL-CCNC: 26 UNITS/L
BILIRUB SERPL-MCNC: 1.2 MG/DL (ref 0.2–1)
BUN SERPL-MCNC: 12 MG/DL (ref 6–20)
BUN/CREAT SERPL: 13 (ref 7–25)
CALCIUM SERPL-MCNC: 9.1 MG/DL (ref 8.4–10.2)
CHLORIDE SERPL-SCNC: 105 MMOL/L (ref 98–107)
CHOLEST SERPL-MCNC: 151 MG/DL
CHOLEST/HDLC SERPL: 3.2 {RATIO}
CO2 SERPL-SCNC: 26 MMOL/L (ref 21–32)
CREAT SERPL-MCNC: 0.92 MG/DL (ref 0.67–1.17)
FASTING STATUS PATIENT QL REPORTED: 0 HRS
GLOBULIN SER-MCNC: 3 G/DL (ref 2–4)
GLUCOSE SERPL-MCNC: 93 MG/DL (ref 65–99)
HDLC SERPL-MCNC: 47 MG/DL
LDLC SERPL-MCNC: 73 MG/DL
LENGTH OF FAST TIME PATIENT: 0 HRS
NONHDLC SERPL-MCNC: 104 MG/DL
POTASSIUM SERPL-SCNC: 4 MMOL/L (ref 3.4–5.1)
PROT SERPL-MCNC: 7 G/DL (ref 6.4–8.2)
SODIUM SERPL-SCNC: 138 MMOL/L (ref 135–145)
TRIGL SERPL-MCNC: 157 MG/DL

## 2019-12-12 ENCOUNTER — OFFICE VISIT CONVERTED (OUTPATIENT)
Dept: FAMILY MEDICINE CLINIC | Age: 54
End: 2019-12-12
Attending: FAMILY MEDICINE

## 2019-12-13 LAB — NONINV COLON CA DNA+OCC BLD SCRN STL QL: NEGATIVE

## 2019-12-23 ENCOUNTER — TELEPHONE (OUTPATIENT)
Dept: FAMILY MEDICINE | Age: 54
End: 2019-12-23

## 2020-01-01 ENCOUNTER — EXTERNAL RECORD (OUTPATIENT)
Dept: OTHER | Age: 55
End: 2020-01-01

## 2020-01-14 ENCOUNTER — HOSPITAL ENCOUNTER (OUTPATIENT)
Dept: OTHER | Facility: HOSPITAL | Age: 55
Discharge: HOME | End: 2020-01-14
Attending: FAMILY MEDICINE

## 2020-01-14 LAB
ALBUMIN SERPL-MCNC: 4.7 G/DL (ref 3.5–5)
ALBUMIN/GLOB SERPL: 1.9 {RATIO} (ref 1.4–2.6)
ALP SERPL-CCNC: 68 U/L (ref 56–119)
ALT SERPL-CCNC: 38 U/L (ref 10–40)
ANION GAP SERPL CALC-SCNC: 21 MMOL/L (ref 8–19)
AST SERPL-CCNC: 29 U/L (ref 15–50)
BILIRUB SERPL-MCNC: 0.44 MG/DL (ref 0.2–1.3)
BUN SERPL-MCNC: 15 MG/DL (ref 5–25)
BUN/CREAT SERPL: 14 {RATIO} (ref 6–20)
CALCIUM SERPL-MCNC: 9.7 MG/DL (ref 8.7–10.4)
CHLORIDE SERPL-SCNC: 101 MMOL/L (ref 99–111)
CHOLEST SERPL-MCNC: 196 MG/DL (ref 107–200)
CHOLEST/HDLC SERPL: 5.3 {RATIO} (ref 3–6)
CONV CO2: 22 MMOL/L (ref 22–32)
CONV TOTAL PROTEIN: 7.2 G/DL (ref 6.3–8.2)
CREAT UR-MCNC: 1.06 MG/DL (ref 0.7–1.2)
EST. AVERAGE GLUCOSE BLD GHB EST-MCNC: 163 MG/DL
GFR SERPLBLD BASED ON 1.73 SQ M-ARVRAT: >60 ML/MIN/{1.73_M2}
GLOBULIN UR ELPH-MCNC: 2.5 G/DL (ref 2–3.5)
GLUCOSE SERPL-MCNC: 112 MG/DL (ref 70–99)
HBA1C MFR BLD: 7.3 % (ref 3.5–5.7)
HDLC SERPL-MCNC: 37 MG/DL (ref 40–60)
LDLC SERPL CALC-MCNC: 128 MG/DL (ref 70–100)
OSMOLALITY SERPL CALC.SUM OF ELEC: 290 MOSM/KG (ref 273–304)
POTASSIUM SERPL-SCNC: 4.5 MMOL/L (ref 3.5–5.3)
PSA SERPL-MCNC: 0.5 NG/ML (ref 0–4)
SODIUM SERPL-SCNC: 139 MMOL/L (ref 135–147)
TRIGL SERPL-MCNC: 154 MG/DL (ref 40–150)
TSH SERPL-ACNC: 2.99 M[IU]/L (ref 0.27–4.2)
URATE SERPL-MCNC: 6.1 MG/DL (ref 3.5–8.5)
VLDLC SERPL-MCNC: 31 MG/DL (ref 5–37)

## 2020-02-28 ENCOUNTER — TELEPHONE (OUTPATIENT)
Dept: ELECTROPHYSIOLOGY | Age: 55
End: 2020-02-28

## 2020-02-29 ENCOUNTER — REMOTE DEVICE CHECK (OUTPATIENT)
Dept: ELECTROPHYSIOLOGY | Age: 55
End: 2020-02-29

## 2020-02-29 DIAGNOSIS — I49.5 SINOATRIAL NODE DYSFUNCTION (CMD): Primary | ICD-10-CM

## 2020-02-29 PROCEDURE — 93296 REM INTERROG EVL PM/IDS: CPT | Performed by: INTERNAL MEDICINE

## 2020-02-29 PROCEDURE — 93294 REM INTERROG EVL PM/LDLS PM: CPT | Performed by: INTERNAL MEDICINE

## 2020-04-27 ENCOUNTER — OFFICE VISIT CONVERTED (OUTPATIENT)
Dept: FAMILY MEDICINE CLINIC | Age: 55
End: 2020-04-27
Attending: FAMILY MEDICINE

## 2020-04-28 ENCOUNTER — HOSPITAL ENCOUNTER (OUTPATIENT)
Dept: OTHER | Facility: HOSPITAL | Age: 55
Discharge: HOME | End: 2020-04-28
Attending: FAMILY MEDICINE

## 2020-04-28 LAB
ALBUMIN SERPL-MCNC: 4.5 G/DL (ref 3.5–5)
ALBUMIN/GLOB SERPL: 1.7 {RATIO} (ref 1.4–2.6)
ALP SERPL-CCNC: 89 U/L (ref 56–119)
ALT SERPL-CCNC: 49 U/L (ref 10–40)
ANION GAP SERPL CALC-SCNC: 17 MMOL/L (ref 8–19)
AST SERPL-CCNC: 27 U/L (ref 15–50)
BILIRUB SERPL-MCNC: 0.47 MG/DL (ref 0.2–1.3)
BUN SERPL-MCNC: 14 MG/DL (ref 5–25)
BUN/CREAT SERPL: 13 {RATIO} (ref 6–20)
CALCIUM SERPL-MCNC: 9.2 MG/DL (ref 8.7–10.4)
CHLORIDE SERPL-SCNC: 98 MMOL/L (ref 99–111)
CHOLEST SERPL-MCNC: 145 MG/DL (ref 107–200)
CHOLEST/HDLC SERPL: 4.1 {RATIO} (ref 3–6)
CONV CO2: 27 MMOL/L (ref 22–32)
CONV TOTAL PROTEIN: 7.1 G/DL (ref 6.3–8.2)
CREAT UR-MCNC: 1.05 MG/DL (ref 0.7–1.2)
EST. AVERAGE GLUCOSE BLD GHB EST-MCNC: 232 MG/DL
GFR SERPLBLD BASED ON 1.73 SQ M-ARVRAT: >60 ML/MIN/{1.73_M2}
GLOBULIN UR ELPH-MCNC: 2.6 G/DL (ref 2–3.5)
GLUCOSE SERPL-MCNC: 239 MG/DL (ref 70–99)
HBA1C MFR BLD: 9.7 % (ref 3.5–5.7)
HDLC SERPL-MCNC: 35 MG/DL (ref 40–60)
LDLC SERPL CALC-MCNC: 71 MG/DL (ref 70–100)
OSMOLALITY SERPL CALC.SUM OF ELEC: 292 MOSM/KG (ref 273–304)
POTASSIUM SERPL-SCNC: 4.9 MMOL/L (ref 3.5–5.3)
SODIUM SERPL-SCNC: 137 MMOL/L (ref 135–147)
TRIGL SERPL-MCNC: 197 MG/DL (ref 40–150)
VLDLC SERPL-MCNC: 39 MG/DL (ref 5–37)

## 2020-05-04 ENCOUNTER — E-ADVICE (OUTPATIENT)
Dept: FAMILY MEDICINE | Age: 55
End: 2020-05-04

## 2020-06-16 ENCOUNTER — LAB SERVICES (OUTPATIENT)
Dept: LAB | Age: 55
End: 2020-06-16

## 2020-06-16 ENCOUNTER — OFFICE VISIT (OUTPATIENT)
Dept: FAMILY MEDICINE | Age: 55
End: 2020-06-16

## 2020-06-16 VITALS
RESPIRATION RATE: 16 BRPM | DIASTOLIC BLOOD PRESSURE: 82 MMHG | BODY MASS INDEX: 31.89 KG/M2 | SYSTOLIC BLOOD PRESSURE: 128 MMHG | HEART RATE: 81 BPM | WEIGHT: 198.41 LBS | HEIGHT: 66 IN | TEMPERATURE: 98.6 F

## 2020-06-16 DIAGNOSIS — Z12.5 PROSTATE CANCER SCREENING: ICD-10-CM

## 2020-06-16 DIAGNOSIS — F51.01 PRIMARY INSOMNIA: ICD-10-CM

## 2020-06-16 DIAGNOSIS — G47.31 COMPLEX SLEEP APNEA SYNDROME: ICD-10-CM

## 2020-06-16 DIAGNOSIS — Z00.00 GENERAL MEDICAL EXAM: Primary | ICD-10-CM

## 2020-06-16 DIAGNOSIS — I49.5 SINOATRIAL NODE DYSFUNCTION (CMD): ICD-10-CM

## 2020-06-16 DIAGNOSIS — J30.2 SEASONAL ALLERGIES: ICD-10-CM

## 2020-06-16 LAB
BASOPHILS # BLD: 0 K/MCL (ref 0–0.3)
BASOPHILS NFR BLD: 1 %
DEPRECATED RDW RBC: 44.6 FL (ref 39–50)
EOSINOPHIL # BLD: 0.1 K/MCL (ref 0.1–0.5)
EOSINOPHIL NFR BLD: 1 %
ERYTHROCYTE [DISTWIDTH] IN BLOOD: 13.3 % (ref 11–15)
HCT VFR BLD CALC: 49.7 % (ref 39–51)
HGB BLD-MCNC: 16.3 G/DL (ref 13–17)
IMM GRANULOCYTES # BLD AUTO: 0 K/MCL (ref 0–0.2)
IMM GRANULOCYTES # BLD: 1 %
LYMPHOCYTES # BLD: 1.1 K/MCL (ref 1–4)
LYMPHOCYTES NFR BLD: 20 %
MCH RBC QN AUTO: 30.2 PG (ref 26–34)
MCHC RBC AUTO-ENTMCNC: 32.8 G/DL (ref 32–36.5)
MCV RBC AUTO: 92 FL (ref 78–100)
MONOCYTES # BLD: 0.5 K/MCL (ref 0.3–0.9)
MONOCYTES NFR BLD: 10 %
NEUTROPHILS # BLD: 3.5 K/MCL (ref 1.8–7.7)
NEUTROPHILS NFR BLD: 67 %
NRBC BLD MANUAL-RTO: 0 /100 WBC
PLATELET # BLD AUTO: 231 K/MCL (ref 140–450)
RBC # BLD: 5.4 MIL/MCL (ref 4.5–5.9)
WBC # BLD: 5.2 K/MCL (ref 4.2–11)

## 2020-06-16 PROCEDURE — 85025 COMPLETE CBC W/AUTO DIFF WBC: CPT | Performed by: INTERNAL MEDICINE

## 2020-06-16 PROCEDURE — 36415 COLL VENOUS BLD VENIPUNCTURE: CPT | Performed by: INTERNAL MEDICINE

## 2020-06-16 PROCEDURE — 80061 LIPID PANEL: CPT | Performed by: INTERNAL MEDICINE

## 2020-06-16 PROCEDURE — 84153 ASSAY OF PSA TOTAL: CPT | Performed by: INTERNAL MEDICINE

## 2020-06-16 PROCEDURE — 80053 COMPREHEN METABOLIC PANEL: CPT | Performed by: INTERNAL MEDICINE

## 2020-06-16 PROCEDURE — 99396 PREV VISIT EST AGE 40-64: CPT | Performed by: PHYSICIAN ASSISTANT

## 2020-06-16 RX ORDER — TADALAFIL 20 MG/1
20 TABLET ORAL PRN
Qty: 30 TABLET | Refills: 11 | Status: SHIPPED | OUTPATIENT
Start: 2020-06-16 | End: 2021-05-24 | Stop reason: SDUPTHER

## 2020-06-16 RX ORDER — ZOLPIDEM TARTRATE 10 MG/1
10 TABLET ORAL NIGHTLY
Qty: 90 TABLET | Refills: 1 | Status: SHIPPED | OUTPATIENT
Start: 2020-06-16 | End: 2020-12-01 | Stop reason: SDUPTHER

## 2020-06-16 ASSESSMENT — PATIENT HEALTH QUESTIONNAIRE - PHQ9
CLINICAL INTERPRETATION OF PHQ9 SCORE: NO FURTHER SCREENING NEEDED
SUM OF ALL RESPONSES TO PHQ9 QUESTIONS 1 AND 2: 0
2. FEELING DOWN, DEPRESSED OR HOPELESS: NOT AT ALL
SUM OF ALL RESPONSES TO PHQ9 QUESTIONS 1 AND 2: 0
1. LITTLE INTEREST OR PLEASURE IN DOING THINGS: NOT AT ALL
CLINICAL INTERPRETATION OF PHQ2 SCORE: NO FURTHER SCREENING NEEDED

## 2020-06-16 ASSESSMENT — ENCOUNTER SYMPTOMS
HEADACHES: 0
SORE THROAT: 0
DIZZINESS: 1
COUGH: 0
DIARRHEA: 0
CHILLS: 0
VOMITING: 0
WHEEZING: 0
FATIGUE: 0
DIAPHORESIS: 0
SHORTNESS OF BREATH: 0
NAUSEA: 0
FEVER: 0

## 2020-06-17 LAB
ALBUMIN SERPL-MCNC: 3.9 G/DL (ref 3.6–5.1)
ALBUMIN/GLOB SERPL: 1.2 {RATIO} (ref 1–2.4)
ALP SERPL-CCNC: 100 UNITS/L (ref 45–117)
ALT SERPL-CCNC: 37 UNITS/L
ANION GAP SERPL CALC-SCNC: 10 MMOL/L (ref 10–20)
AST SERPL-CCNC: 24 UNITS/L
BILIRUB SERPL-MCNC: 1.1 MG/DL (ref 0.2–1)
BUN SERPL-MCNC: 11 MG/DL (ref 6–20)
BUN/CREAT SERPL: 12 (ref 7–25)
CALCIUM SERPL-MCNC: 9.1 MG/DL (ref 8.4–10.2)
CHLORIDE SERPL-SCNC: 103 MMOL/L (ref 98–107)
CHOLEST SERPL-MCNC: 207 MG/DL
CHOLEST/HDLC SERPL: 4.6 {RATIO}
CO2 SERPL-SCNC: 28 MMOL/L (ref 21–32)
CREAT SERPL-MCNC: 0.95 MG/DL (ref 0.67–1.17)
FASTING DURATION TIME PATIENT: 12 HOURS
FASTING DURATION TIME PATIENT: 12 HOURS
GFR SERPLBLD BASED ON 1.73 SQ M-ARVRAT: 89 ML/MIN
GLOBULIN SER-MCNC: 3.3 G/DL (ref 2–4)
GLUCOSE SERPL-MCNC: 89 MG/DL (ref 65–99)
HDLC SERPL-MCNC: 45 MG/DL
LDLC SERPL CALC-MCNC: 125 MG/DL
NONHDLC SERPL-MCNC: 162 MG/DL
POTASSIUM SERPL-SCNC: 4.3 MMOL/L (ref 3.4–5.1)
PROT SERPL-MCNC: 7.2 G/DL (ref 6.4–8.2)
PSA SERPL-MCNC: 2.89 NG/ML
SODIUM SERPL-SCNC: 137 MMOL/L (ref 135–145)
TRIGL SERPL-MCNC: 184 MG/DL

## 2020-07-30 ENCOUNTER — APPOINTMENT (OUTPATIENT)
Dept: ELECTROPHYSIOLOGY | Age: 55
End: 2020-07-30

## 2020-08-04 ENCOUNTER — TELEPHONE (OUTPATIENT)
Dept: ELECTROPHYSIOLOGY | Age: 55
End: 2020-08-04

## 2020-08-04 ENCOUNTER — REMOTE DEVICE CHECK (OUTPATIENT)
Dept: ELECTROPHYSIOLOGY | Age: 55
End: 2020-08-04

## 2020-08-04 DIAGNOSIS — I49.5 SINOATRIAL NODE DYSFUNCTION (CMD): Primary | ICD-10-CM

## 2020-08-04 PROCEDURE — 93294 REM INTERROG EVL PM/LDLS PM: CPT | Performed by: INTERNAL MEDICINE

## 2020-08-04 PROCEDURE — 93296 REM INTERROG EVL PM/IDS: CPT | Performed by: INTERNAL MEDICINE

## 2020-08-06 ENCOUNTER — OFFICE VISIT (OUTPATIENT)
Dept: ELECTROPHYSIOLOGY | Age: 55
End: 2020-08-06

## 2020-08-06 VITALS
BODY MASS INDEX: 31.82 KG/M2 | SYSTOLIC BLOOD PRESSURE: 120 MMHG | DIASTOLIC BLOOD PRESSURE: 80 MMHG | HEIGHT: 66 IN | WEIGHT: 198 LBS | HEART RATE: 68 BPM

## 2020-08-06 DIAGNOSIS — R55 NEAR SYNCOPE: ICD-10-CM

## 2020-08-06 DIAGNOSIS — Z95.0 PACEMAKER: ICD-10-CM

## 2020-08-06 DIAGNOSIS — I49.5 SINOATRIAL NODE DYSFUNCTION (CMD): ICD-10-CM

## 2020-08-06 DIAGNOSIS — I47.10 SVT (SUPRAVENTRICULAR TACHYCARDIA): Primary | ICD-10-CM

## 2020-08-06 DIAGNOSIS — R55 SYNCOPE AND COLLAPSE: ICD-10-CM

## 2020-08-06 PROCEDURE — 93280 PM DEVICE PROGR EVAL DUAL: CPT | Performed by: INTERNAL MEDICINE

## 2020-08-07 ENCOUNTER — OFFICE VISIT CONVERTED (OUTPATIENT)
Dept: FAMILY MEDICINE CLINIC | Age: 55
End: 2020-08-07
Attending: FAMILY MEDICINE

## 2020-08-18 ENCOUNTER — APPOINTMENT (OUTPATIENT)
Dept: ELECTROPHYSIOLOGY | Age: 55
End: 2020-08-18

## 2020-09-14 ENCOUNTER — NURSE TRIAGE (OUTPATIENT)
Dept: FAMILY MEDICINE | Age: 55
End: 2020-09-14

## 2020-09-14 RX ORDER — AZITHROMYCIN 250 MG/1
TABLET, FILM COATED ORAL
Qty: 6 TABLET | Refills: 0 | Status: SHIPPED | OUTPATIENT
Start: 2020-09-14 | End: 2020-11-25

## 2020-10-29 RX ORDER — FLUTICASONE PROPIONATE 50 MCG
SPRAY, SUSPENSION (ML) NASAL
Qty: 48 ML | Refills: 1 | Status: SHIPPED | OUTPATIENT
Start: 2020-10-29 | End: 2021-03-17

## 2020-11-02 ENCOUNTER — APPOINTMENT (OUTPATIENT)
Dept: PULMONOLOGY | Age: 55
End: 2020-11-02

## 2020-11-16 ENCOUNTER — TELEPHONE (OUTPATIENT)
Dept: ELECTROPHYSIOLOGY | Age: 55
End: 2020-11-16

## 2020-11-25 ENCOUNTER — OFFICE VISIT (OUTPATIENT)
Dept: CARDIOLOGY | Age: 55
End: 2020-11-25

## 2020-11-25 ENCOUNTER — OFFICE VISIT (OUTPATIENT)
Dept: PULMONOLOGY | Age: 55
End: 2020-11-25

## 2020-11-25 VITALS
HEIGHT: 66 IN | WEIGHT: 196.21 LBS | SYSTOLIC BLOOD PRESSURE: 124 MMHG | RESPIRATION RATE: 16 BRPM | DIASTOLIC BLOOD PRESSURE: 84 MMHG | HEART RATE: 91 BPM | BODY MASS INDEX: 31.53 KG/M2 | OXYGEN SATURATION: 98 %

## 2020-11-25 VITALS
BODY MASS INDEX: 32.73 KG/M2 | SYSTOLIC BLOOD PRESSURE: 132 MMHG | DIASTOLIC BLOOD PRESSURE: 88 MMHG | HEIGHT: 65 IN | HEART RATE: 95 BPM | WEIGHT: 196.43 LBS

## 2020-11-25 DIAGNOSIS — F51.04 PSYCHOPHYSIOLOGICAL INSOMNIA: ICD-10-CM

## 2020-11-25 DIAGNOSIS — G47.31 COMPLEX SLEEP APNEA SYNDROME: Primary | ICD-10-CM

## 2020-11-25 DIAGNOSIS — E78.5 HYPERLIPIDEMIA, UNSPECIFIED HYPERLIPIDEMIA TYPE: Primary | ICD-10-CM

## 2020-11-25 DIAGNOSIS — Z13.6 SCREENING FOR ISCHEMIC HEART DISEASE: ICD-10-CM

## 2020-11-25 PROCEDURE — 93000 ELECTROCARDIOGRAM COMPLETE: CPT | Performed by: INTERNAL MEDICINE

## 2020-11-25 PROCEDURE — 99214 OFFICE O/P EST MOD 30 MIN: CPT | Performed by: INTERNAL MEDICINE

## 2020-11-25 PROCEDURE — 99203 OFFICE O/P NEW LOW 30 MIN: CPT | Performed by: INTERNAL MEDICINE

## 2020-11-30 ENCOUNTER — REMOTE DEVICE CHECK (OUTPATIENT)
Dept: ELECTROPHYSIOLOGY | Age: 55
End: 2020-11-30

## 2020-11-30 DIAGNOSIS — I49.5 SINOATRIAL NODE DYSFUNCTION (CMD): Primary | ICD-10-CM

## 2020-11-30 PROCEDURE — 93296 REM INTERROG EVL PM/IDS: CPT | Performed by: INTERNAL MEDICINE

## 2020-11-30 PROCEDURE — 93294 REM INTERROG EVL PM/LDLS PM: CPT | Performed by: INTERNAL MEDICINE

## 2020-12-01 ENCOUNTER — OFFICE VISIT (OUTPATIENT)
Dept: FAMILY MEDICINE | Age: 55
End: 2020-12-01

## 2020-12-01 ENCOUNTER — LAB SERVICES (OUTPATIENT)
Dept: LAB | Age: 55
End: 2020-12-01

## 2020-12-01 VITALS
SYSTOLIC BLOOD PRESSURE: 118 MMHG | WEIGHT: 198.85 LBS | DIASTOLIC BLOOD PRESSURE: 82 MMHG | HEIGHT: 65 IN | BODY MASS INDEX: 33.13 KG/M2 | TEMPERATURE: 97.5 F | OXYGEN SATURATION: 96 % | HEART RATE: 86 BPM | RESPIRATION RATE: 18 BRPM

## 2020-12-01 DIAGNOSIS — R10.13 EPIGASTRIC PAIN: ICD-10-CM

## 2020-12-01 DIAGNOSIS — G47.31 COMPLEX SLEEP APNEA SYNDROME: Primary | ICD-10-CM

## 2020-12-01 DIAGNOSIS — Z20.822 EXPOSURE TO COVID-19 VIRUS: ICD-10-CM

## 2020-12-01 DIAGNOSIS — Z95.0 PACEMAKER: ICD-10-CM

## 2020-12-01 DIAGNOSIS — F51.01 PRIMARY INSOMNIA: ICD-10-CM

## 2020-12-01 DIAGNOSIS — I49.5 SINOATRIAL NODE DYSFUNCTION (CMD): ICD-10-CM

## 2020-12-01 LAB
SARS-COV-2 IGG SERPL QL IA: NEGATIVE
SARS-COV-2 N IGG SERPL QL IA: 0.04

## 2020-12-01 PROCEDURE — 82150 ASSAY OF AMYLASE: CPT | Performed by: INTERNAL MEDICINE

## 2020-12-01 PROCEDURE — 86769 SARS-COV-2 COVID-19 ANTIBODY: CPT | Performed by: INTERNAL MEDICINE

## 2020-12-01 PROCEDURE — 99213 OFFICE O/P EST LOW 20 MIN: CPT | Performed by: PHYSICIAN ASSISTANT

## 2020-12-01 PROCEDURE — 36415 COLL VENOUS BLD VENIPUNCTURE: CPT | Performed by: INTERNAL MEDICINE

## 2020-12-01 RX ORDER — ZOLPIDEM TARTRATE 10 MG/1
10 TABLET ORAL NIGHTLY
Qty: 90 TABLET | Refills: 1 | Status: SHIPPED | OUTPATIENT
Start: 2020-12-01 | End: 2021-06-22

## 2020-12-01 ASSESSMENT — ENCOUNTER SYMPTOMS
RHINORRHEA: 0
WHEEZING: 0
FATIGUE: 0
DIAPHORESIS: 0
SHORTNESS OF BREATH: 0
DIARRHEA: 0
SORE THROAT: 0
ABDOMINAL PAIN: 1
HEADACHES: 0
FEVER: 0
COUGH: 0
DIZZINESS: 0
NAUSEA: 0
CHILLS: 0

## 2020-12-02 ENCOUNTER — IMAGING SERVICES (OUTPATIENT)
Dept: ULTRASOUND IMAGING | Age: 55
End: 2020-12-02
Attending: PHYSICIAN ASSISTANT

## 2020-12-02 DIAGNOSIS — R10.13 EPIGASTRIC PAIN: ICD-10-CM

## 2020-12-02 LAB — AMYLASE SERPL-CCNC: 48 UNITS/L (ref 25–115)

## 2020-12-02 PROCEDURE — 76705 ECHO EXAM OF ABDOMEN: CPT | Performed by: RADIOLOGY

## 2020-12-09 ENCOUNTER — IMAGING SERVICES (OUTPATIENT)
Dept: CT IMAGING | Age: 55
End: 2020-12-09
Attending: INTERNAL MEDICINE

## 2020-12-09 DIAGNOSIS — E78.5 HYPERLIPIDEMIA, UNSPECIFIED HYPERLIPIDEMIA TYPE: ICD-10-CM

## 2020-12-09 PROCEDURE — 75571 CT HRT W/O DYE W/CA TEST: CPT | Performed by: RADIOLOGY

## 2020-12-10 ENCOUNTER — TELEPHONE (OUTPATIENT)
Dept: CARDIOLOGY | Age: 55
End: 2020-12-10

## 2020-12-10 DIAGNOSIS — E78.5 HYPERLIPIDEMIA, UNSPECIFIED HYPERLIPIDEMIA TYPE: Primary | ICD-10-CM

## 2020-12-10 RX ORDER — PRAVASTATIN SODIUM 20 MG
20 TABLET ORAL DAILY
Qty: 90 TABLET | Refills: 3 | Status: SHIPPED | OUTPATIENT
Start: 2020-12-10 | End: 2021-03-01 | Stop reason: DRUGHIGH

## 2021-01-27 ENCOUNTER — TELEPHONE (OUTPATIENT)
Dept: ELECTROPHYSIOLOGY | Age: 56
End: 2021-01-27

## 2021-02-22 ENCOUNTER — OFFICE VISIT (OUTPATIENT)
Dept: CARDIOLOGY | Age: 56
End: 2021-02-22

## 2021-02-22 VITALS
BODY MASS INDEX: 31.96 KG/M2 | HEART RATE: 76 BPM | SYSTOLIC BLOOD PRESSURE: 126 MMHG | WEIGHT: 198.85 LBS | DIASTOLIC BLOOD PRESSURE: 88 MMHG | HEIGHT: 66 IN

## 2021-02-22 DIAGNOSIS — E78.5 HYPERLIPIDEMIA, UNSPECIFIED HYPERLIPIDEMIA TYPE: ICD-10-CM

## 2021-02-22 DIAGNOSIS — I25.84 CORONARY ARTERY CALCIFICATION: Primary | ICD-10-CM

## 2021-02-22 DIAGNOSIS — I25.10 CORONARY ARTERY CALCIFICATION: Primary | ICD-10-CM

## 2021-02-22 PROCEDURE — 99213 OFFICE O/P EST LOW 20 MIN: CPT | Performed by: INTERNAL MEDICINE

## 2021-02-24 ENCOUNTER — LAB SERVICES (OUTPATIENT)
Dept: LAB | Age: 56
End: 2021-02-24

## 2021-02-24 ENCOUNTER — TELEPHONE (OUTPATIENT)
Dept: FAMILY MEDICINE | Age: 56
End: 2021-02-24

## 2021-02-24 DIAGNOSIS — I47.10 SVT (SUPRAVENTRICULAR TACHYCARDIA): ICD-10-CM

## 2021-02-24 DIAGNOSIS — E16.2 HYPOGLYCEMIA: Primary | ICD-10-CM

## 2021-02-24 DIAGNOSIS — I25.84 CORONARY ARTERY CALCIFICATION: ICD-10-CM

## 2021-02-24 DIAGNOSIS — E78.5 HYPERLIPIDEMIA, UNSPECIFIED HYPERLIPIDEMIA TYPE: ICD-10-CM

## 2021-02-24 DIAGNOSIS — E16.2 HYPOGLYCEMIA: ICD-10-CM

## 2021-02-24 DIAGNOSIS — I25.10 CORONARY ARTERY CALCIFICATION: ICD-10-CM

## 2021-02-24 PROCEDURE — 85025 COMPLETE CBC W/AUTO DIFF WBC: CPT | Performed by: INTERNAL MEDICINE

## 2021-02-24 PROCEDURE — 80053 COMPREHEN METABOLIC PANEL: CPT | Performed by: INTERNAL MEDICINE

## 2021-02-24 PROCEDURE — 80061 LIPID PANEL: CPT | Performed by: INTERNAL MEDICINE

## 2021-02-24 PROCEDURE — 36415 COLL VENOUS BLD VENIPUNCTURE: CPT | Performed by: INTERNAL MEDICINE

## 2021-02-25 LAB
ALBUMIN SERPL-MCNC: 3.9 G/DL (ref 3.6–5.1)
ALBUMIN/GLOB SERPL: 1.3 {RATIO} (ref 1–2.4)
ALP SERPL-CCNC: 92 UNITS/L (ref 45–117)
ALT SERPL-CCNC: 35 UNITS/L
ANION GAP SERPL CALC-SCNC: 10 MMOL/L (ref 10–20)
AST SERPL-CCNC: 19 UNITS/L
BASOPHILS # BLD: 0 K/MCL (ref 0–0.3)
BASOPHILS NFR BLD: 1 %
BILIRUB SERPL-MCNC: 1 MG/DL (ref 0.2–1)
BUN SERPL-MCNC: 16 MG/DL (ref 6–20)
BUN/CREAT SERPL: 16 (ref 7–25)
CALCIUM SERPL-MCNC: 9.2 MG/DL (ref 8.4–10.2)
CHLORIDE SERPL-SCNC: 102 MMOL/L (ref 98–107)
CHOLEST SERPL-MCNC: 185 MG/DL
CHOLEST/HDLC SERPL: 3.7 {RATIO}
CO2 SERPL-SCNC: 29 MMOL/L (ref 21–32)
CREAT SERPL-MCNC: 1.02 MG/DL (ref 0.67–1.17)
DEPRECATED RDW RBC: 43.1 FL (ref 39–50)
EOSINOPHIL # BLD: 0 K/MCL (ref 0–0.5)
EOSINOPHIL NFR BLD: 1 %
ERYTHROCYTE [DISTWIDTH] IN BLOOD: 13.1 % (ref 11–15)
FASTING DURATION TIME PATIENT: 12 HOURS
FASTING DURATION TIME PATIENT: 12 HOURS
GFR SERPLBLD BASED ON 1.73 SQ M-ARVRAT: 81 ML/MIN/1.73M2
GLOBULIN SER-MCNC: 2.9 G/DL (ref 2–4)
GLUCOSE SERPL-MCNC: 94 MG/DL (ref 65–99)
HCT VFR BLD CALC: 47.4 % (ref 39–51)
HDLC SERPL-MCNC: 50 MG/DL
HGB BLD-MCNC: 15.9 G/DL (ref 13–17)
IMM GRANULOCYTES # BLD AUTO: 0 K/MCL (ref 0–0.2)
IMM GRANULOCYTES # BLD: 1 %
LDLC SERPL CALC-MCNC: 99 MG/DL
LYMPHOCYTES # BLD: 0.9 K/MCL (ref 1–4)
LYMPHOCYTES NFR BLD: 21 %
MCH RBC QN AUTO: 30.7 PG (ref 26–34)
MCHC RBC AUTO-ENTMCNC: 33.5 G/DL (ref 32–36.5)
MCV RBC AUTO: 91.5 FL (ref 78–100)
MONOCYTES # BLD: 0.4 K/MCL (ref 0.3–0.9)
MONOCYTES NFR BLD: 9 %
NEUTROPHILS # BLD: 3.1 K/MCL (ref 1.8–7.7)
NEUTROPHILS NFR BLD: 67 %
NONHDLC SERPL-MCNC: 135 MG/DL
NRBC BLD MANUAL-RTO: 0 /100 WBC
PLATELET # BLD AUTO: 196 K/MCL (ref 140–450)
POTASSIUM SERPL-SCNC: 4 MMOL/L (ref 3.4–5.1)
PROT SERPL-MCNC: 6.8 G/DL (ref 6.4–8.2)
RBC # BLD: 5.18 MIL/MCL (ref 4.5–5.9)
SODIUM SERPL-SCNC: 137 MMOL/L (ref 135–145)
TRIGL SERPL-MCNC: 180 MG/DL
WBC # BLD: 4.5 K/MCL (ref 4.2–11)

## 2021-03-01 ENCOUNTER — TELEPHONE (OUTPATIENT)
Dept: CARDIOLOGY | Age: 56
End: 2021-03-01

## 2021-03-01 DIAGNOSIS — E78.5 HYPERLIPIDEMIA, UNSPECIFIED HYPERLIPIDEMIA TYPE: ICD-10-CM

## 2021-03-01 DIAGNOSIS — I25.10 CORONARY ARTERY CALCIFICATION: Primary | ICD-10-CM

## 2021-03-01 DIAGNOSIS — I25.84 CORONARY ARTERY CALCIFICATION: Primary | ICD-10-CM

## 2021-03-01 RX ORDER — PRAVASTATIN SODIUM 40 MG
40 TABLET ORAL DAILY
Qty: 30 TABLET | Refills: 11 | Status: SHIPPED | OUTPATIENT
Start: 2021-03-01 | End: 2021-05-24 | Stop reason: SDUPTHER

## 2021-03-03 ENCOUNTER — REMOTE DEVICE CHECK (OUTPATIENT)
Dept: ELECTROPHYSIOLOGY | Age: 56
End: 2021-03-03

## 2021-03-03 DIAGNOSIS — I49.5 SINOATRIAL NODE DYSFUNCTION (CMD): Primary | ICD-10-CM

## 2021-03-03 PROCEDURE — 93296 REM INTERROG EVL PM/IDS: CPT | Performed by: INTERNAL MEDICINE

## 2021-03-03 PROCEDURE — 93294 REM INTERROG EVL PM/LDLS PM: CPT | Performed by: INTERNAL MEDICINE

## 2021-03-10 ENCOUNTER — TELEPHONE (OUTPATIENT)
Dept: ELECTROPHYSIOLOGY | Age: 56
End: 2021-03-10

## 2021-03-10 ENCOUNTER — OFFICE VISIT (OUTPATIENT)
Dept: ELECTROPHYSIOLOGY | Age: 56
End: 2021-03-10

## 2021-03-10 DIAGNOSIS — Z95.0 PACEMAKER: Primary | ICD-10-CM

## 2021-03-17 RX ORDER — FLUTICASONE PROPIONATE 50 MCG
SPRAY, SUSPENSION (ML) NASAL
Qty: 48 ML | Refills: 1 | Status: SHIPPED | OUTPATIENT
Start: 2021-03-17 | End: 2021-05-24 | Stop reason: SDUPTHER

## 2021-03-29 ENCOUNTER — OFFICE VISIT CONVERTED (OUTPATIENT)
Dept: FAMILY MEDICINE CLINIC | Age: 56
End: 2021-03-29
Attending: FAMILY MEDICINE

## 2021-04-03 ENCOUNTER — HOSPITAL ENCOUNTER (OUTPATIENT)
Dept: OTHER | Facility: HOSPITAL | Age: 56
Discharge: HOME | End: 2021-04-03
Attending: FAMILY MEDICINE

## 2021-04-03 LAB
ALBUMIN SERPL-MCNC: 4.3 G/DL (ref 3.5–5)
ALBUMIN/GLOB SERPL: 1.5 {RATIO} (ref 1.4–2.6)
ALP SERPL-CCNC: 65 U/L (ref 56–119)
ALT SERPL-CCNC: 42 U/L (ref 10–40)
ANION GAP SERPL CALC-SCNC: 19 MMOL/L (ref 8–19)
AST SERPL-CCNC: 31 U/L (ref 15–50)
BILIRUB SERPL-MCNC: 0.56 MG/DL (ref 0.2–1.3)
BUN SERPL-MCNC: 15 MG/DL (ref 5–25)
BUN/CREAT SERPL: 14 {RATIO} (ref 6–20)
CALCIUM SERPL-MCNC: 9.1 MG/DL (ref 8.7–10.4)
CHLORIDE SERPL-SCNC: 103 MMOL/L (ref 99–111)
CHOLEST SERPL-MCNC: 151 MG/DL (ref 107–200)
CHOLEST/HDLC SERPL: 4.1 {RATIO} (ref 3–6)
CONV CO2: 21 MMOL/L (ref 22–32)
CONV CREATININE URINE, RANDOM: 95.1 MG/DL (ref 10–300)
CONV MICROALBUM.,U,RANDOM: <12 MG/L (ref 0–20)
CONV TOTAL PROTEIN: 7.1 G/DL (ref 6.3–8.2)
CREAT UR-MCNC: 1.06 MG/DL (ref 0.7–1.2)
EST. AVERAGE GLUCOSE BLD GHB EST-MCNC: 206 MG/DL
GFR SERPLBLD BASED ON 1.73 SQ M-ARVRAT: >60 ML/MIN/{1.73_M2}
GLOBULIN UR ELPH-MCNC: 2.8 G/DL (ref 2–3.5)
GLUCOSE SERPL-MCNC: 190 MG/DL (ref 70–99)
HBA1C MFR BLD: 8.8 % (ref 3.5–5.7)
HDLC SERPL-MCNC: 37 MG/DL (ref 40–60)
LDLC SERPL CALC-MCNC: 94 MG/DL (ref 70–100)
MICROALBUMIN/CREAT UR: 12.6 MG/G{CRE} (ref 0–25)
OSMOLALITY SERPL CALC.SUM OF ELEC: 292 MOSM/KG (ref 273–304)
POTASSIUM SERPL-SCNC: 4.9 MMOL/L (ref 3.5–5.3)
PSA SERPL-MCNC: 0.51 NG/ML (ref 0–4)
SODIUM SERPL-SCNC: 138 MMOL/L (ref 135–147)
TRIGL SERPL-MCNC: 98 MG/DL (ref 40–150)
TSH SERPL-ACNC: 1.56 M[IU]/L (ref 0.27–4.2)
VLDLC SERPL-MCNC: 20 MG/DL (ref 5–37)

## 2021-05-13 ENCOUNTER — APPOINTMENT (OUTPATIENT)
Dept: ELECTROPHYSIOLOGY | Age: 56
End: 2021-05-13

## 2021-05-18 NOTE — PROGRESS NOTES
Man Gill  1965     Office/Outpatient Visit    Visit Date: Fri, Aug 7, 2020 01:19 pm    Provider: Vadim Mishra MD (Assistant: Nani Garcia RN)    Location: [facility]        Electronically signed by Vadim Mishra MD on  08/10/2020 07:31:34 AM                             Subjective:        CC: diabetes follow up    HPI:           eGra presents with type 2 diabetes mellitus without complications.  Current meds include an oral hypoglycemic ( Glucophage XR ).  He reports home blood glucose readings have been fairly good, with average fasting glucoses running in the 120-150 mg/dL range. He checks his glucose 1 to 2 times daily.  Most recent lab results include LDL:  71 (mg/dL) (04/28/2020), Hemoglobin A1c:  7.3 (%) (01/14/2020),  9.7 (%) (04/28/2020).            Additionally, he presents with history of essential (primary) hypertension.  his current cardiac medication regimen includes an angiotensin receptor blocker ( Cozaar ).  Gera does not check his blood pressure other than at his clinic appointments.  He is tolerating the medication well without side effects.  Compliance with treatment has been good; he takes his medication as directed and follows up as directed.            Concerning mixed hyperlipidemia, current treatment includes Lipitor.  Compliance with treatment has been good; he takes his medication as directed and follows up as directed.  He denies experiencing any hypercholesterolemia related symptoms.      ROS:     CONSTITUTIONAL:  Negative for chills and fever.      CARDIOVASCULAR:  Negative for chest pain and palpitations.      RESPIRATORY:  Negative for recent cough and dyspnea.      GASTROINTESTINAL:  Negative for abdominal pain, nausea and vomiting.      INTEGUMENTARY:  Negative for atypical mole(s) and rash.          Past Medical History / Family History / Social History:         Last Reviewed on 12/12/2019 04:17 PM by Vadim Mishra    Past Medical  History:             PAST MEDICAL HISTORY         Hypertension     Renal Stones: dx'd in 2014;         PREVENTIVE HEALTH MAINTENANCE             COLORECTAL CANCER SCREENING: Up to date (colonoscopy q10y; sigmoidoscopy q5y; Cologuard q3y) was last done 11/2013, Results are in chart; colonoscopy with normal results         Surgical History:     NONE         Family History:     Father: Coronary Artery Disease; Hypertension     Mother: Hypertension     Brother(s): Coronary Artery Disease         Social History:     Occupation: Employed at [employer] later [employer]     Marital Status:      Children: 1 child         Tobacco/Alcohol/Supplements:     Last Reviewed on 4/27/2020 12:51 PM by Nani Garcia    Tobacco: He has a past history of cigarette smoking; quit date:  1996.          Substance Abuse History:     Last Reviewed on 12/12/2019 04:17 PM by Vadim Mishra    NEGATIVE         Mental Health History:     Last Reviewed on 12/12/2019 04:17 PM by Vadim Mishra        Communicable Diseases (eg STDs):     Last Reviewed on 12/12/2019 04:17 PM by Vadim Mishra        Current Problems:     Last Reviewed on 12/12/2019 04:17 PM by Vadim Mishra    Essential (primary) hypertension    Mixed hyperlipidemia    Encounter for screening for malignant neoplasm of prostate    Gout, unspecified    Calculus of kidney    Impaired fasting glucose    Other specified nontoxic goiter    Type 2 diabetes mellitus without complications        Immunizations:     Hep A, adult dose 1/10/2019    Hep A, adult dose 7/10/2019        Allergies:     Last Reviewed on 4/27/2020 12:51 PM by Nani Garcia    No Known Allergies.        Current Medications:     Last Reviewed on 4/27/2020 12:51 PM by Nani Garcia    losartan 100 mg oral tablet [TAKE 1 TABLET(S) BY MOUTH DAILY]    Vitamin D3 2,000 unit oral capsule [1 capsule daily]    aspirin 81 mg oral tablet, delayed release (enteric coated) [1 tab 3 x wk]    Vitamin C 500 mg oral  tablet [1 tab daily]    Cinnamon 1 tablet daily     allopurinoL 100 mg oral tablet [TAKE ONE TABLET BY MOUTH TWICE DAILY]    atorvastatin 20 mg oral tablet [take 1 tablet (20 mg) by oral route once daily]    metFORMIN 500 mg oral Tablet, Extended Release 24 hr [Take 2 tablet(s) by mouth daily]    Accu-Chek Guide test strips  [USE TO TEST BLOOD SUGAR EVERY DAY]    lancets 32 gauge  [check sugar once daily Dx E11.9]    blood-glucose meter  [check sugar once daily Dx E11.9]    predniSONE 20 mg oral tablet [take 2 tabs daily x 5 days, then 1 tab daily x 5 days]        Objective:        Vitals:         Current: 8/7/2020 1:31:21 PM    Ht:  5 ft, 10 in;  Wt: 184.2 lbs;  BMI: 26.4T: 97.4 F (temporal);  BP: 110/83 mm Hg (left arm, sitting);  P: 86 bpm (left arm (BP Cuff), sitting);  sCr: 1.05 mg/dL;  GFR: 76.78        Exams:     PHYSICAL EXAM:     GENERAL: Vitals recorded well developed, well nourished;     NECK: range of motion is normal; thyroid is non-palpable;     RESPIRATORY: normal respiratory rate and pattern with no distress; normal breath sounds with no rales, rhonchi, wheezes or rubs;     CARDIOVASCULAR: normal rate; rhythm is regular;  no systolic murmur;     GASTROINTESTINAL: nontender; normal bowel sounds; no masses;     LYMPHATIC: no enlargement of cervical or facial nodes; no supraclavicular nodes;     SKIN:  no significant rashes or lesions; no suspicious moles;     NEUROLOGIC: mental status: alert and oriented x 3; cranial nerves II-XII grossly intact;     PSYCHIATRIC: appropriate affect and demeanor; normal psychomotor function;         Lab/Test Results:         Hemoglobin A1c: 7.8 (08/07/2020),     Performed by:: tls (08/07/2020),             Assessment:         E11.9   Type 2 diabetes mellitus without complications       I10   Essential (primary) hypertension       E78.2   Mixed hyperlipidemia           ORDERS:         Meds Prescribed:       [Refilled] atorvastatin 20 mg oral tablet [take 1 tablet (20 mg)  by oral route once daily], #90 (ninety) tablets, Refills: 1 (one)       [Refilled] allopurinoL 100 mg oral tablet [take 2 tablets (200 mg) by oral route once daily], #180 (one hundred and eighty) tablets, Refills: 1 (one)       [Refilled] losartan 100 mg oral tablet [take 1 tablet (100 mg) by oral route once daily], #90 (ninety) tablets, Refills: 1 (one)       [Refilled] metFORMIN 500 mg oral Tablet, Extended Release 24 hr [Take 2 tablet(s) by mouth daily], #180 (one hundred and eighty) tablets, Refills: 1 (one)         Radiology/Test Orders:       3017F  Colorectal CA screen results documented and reviewed (PV)  (In-House)              Lab Orders:       [id number]*  Hgb A1c fast lab  (In-House)              Procedures Ordered:       [id number]  Collection of capillary blood specimen (eg, finger, heel, ear stick)  (In-House)              Other Orders:         Depression screen negative  (In-House)                      Plan:         Type 2 diabetes mellitus without complications    LABORATORY:  Labs ordered to be performed today include Hgb A1c inhouse fast lab.      RECOMMENDATIONS given include: Gera is doing well overall.  He has been doing better with regard to his sugars.  We will repeat an A1C at this time and go from there.  No other changes are anticipated..  MIPS PHQ-9 Depression Screening: Completed form scanned and in chart; Total Score 0; Negative Depression Screen         ADDENDUM - Please let Gera know that his A1C came back at 7.8%.  This is quite a bit better than the last check, but it is still above goal given his age and overall health.  I would recommend ADDING Farxiga to his medications and have sent this to his pharmacy.  I'd like to start him on the lower dose initially and will send the higher dose if he tolerates okay.  Please give usual cautions with this.  TICKLE to call him in 3 weeks to see if this is helping lower his sugars.  Again, continue other meds as they are.  He should go to [email or web address]  and see if he can print or download a copay card under the savings tab on their site.  TICKLE for follow up office visit 90 days.  Let me know if he has concerns.  Thanks. - Vadim Mishra MD - 8/7/20 - 17:20          Prescriptions:       [Refilled] atorvastatin 20 mg oral tablet [take 1 tablet (20 mg) by oral route once daily], #90 (ninety) tablets, Refills: 1 (one)       [Refilled] allopurinoL 100 mg oral tablet [take 2 tablets (200 mg) by oral route once daily], #180 (one hundred and eighty) tablets, Refills: 1 (one)       [Refilled] losartan 100 mg oral tablet [take 1 tablet (100 mg) by oral route once daily], #90 (ninety) tablets, Refills: 1 (one)       [Refilled] metFORMIN 500 mg oral Tablet, Extended Release 24 hr [Take 2 tablet(s) by mouth daily], #180 (one hundred and eighty) tablets, Refills: 1 (one)           Orders:       [id number]*  Hgb A1c fast lab  (In-House)              Depression screen negative  (In-House)            3017F  Colorectal CA screen results documented and reviewed (PV)  (In-House)            00410  Collection of capillary blood specimen (eg, finger, heel, ear stick)  (In-House)              Essential (primary) hypertensionAs above.        Mixed hyperlipidemiaAs above.            Charge Capture:         Primary Diagnosis:     E11.9  Type 2 diabetes mellitus without complications           Orders:      [id number]  Office/outpatient visit; established patient, level 4  (In-House)            [id number]*  Hgb A1c fast lab  (In-House)              Depression screen negative  (In-House)            3017F  Colorectal CA screen results documented and reviewed (PV)  (In-House)            24619  Collection of capillary blood specimen (eg, finger, heel, ear stick)  (In-House)              I10  Essential (primary) hypertension     E78.2  Mixed hyperlipidemia         ADDENDUMS:      ____________________________________    Addendum: 08/10/2020 09:58 AM - Four, Team        tr/        Addendum:  08/20/2020 04:27 PM - Four, Team        pt inf, tickled/th        Addendum: 09/28/2020 11:05 AM - Rosa Pratt        pt states he never did  the Farxiga, it was $500 per month, with co-pay card it was going to be $250 per month, pt states sugars have been about 130./atc        Addendum: 09/30/2020 08:02 AM - Vadim Mishra        Noted.  Continue current care.  TICKLE for follow up visit after 11/7/20.  Thanks.        Addendum: 10/01/2020 02:05 PM - Rosa Prattled./atc        Addendum: 12/03/2020 11:19 AM - Four, Team        pt inf re: tickle/th

## 2021-05-18 NOTE — PROGRESS NOTES
Man Gill  1965     Office/Outpatient Visit    Visit Date: Thu, Dec 12, 2019 03:44 pm    Provider: Vadim Mishra MD (Assistant: Brionna Cobb, )    Location: [facility]        Electronically signed by Vadim Mishra MD on  12/13/2019 08:18:50 AM                             Subjective:        CC: blood pressure, kidney stones    HPI:           Patient to be evaluated for essential (primary) hypertension.  His current cardiac medication regimen includes an angiotensin receptor blocker ( Cozaar ).  Gera does not check his blood pressure other than at his clinic appointments.  He is tolerating the medication well without side effects.  Compliance with treatment has been fair; he skips some medication doses due to just ran out.        Gera also has a history of uric acid kidney stones as well as gout for which he takes allopurinol.  He has been prescribed this by a different provider.  He has apparently had lab work done fairly recently.  He denies acute issue today on these problems.    ROS:     CONSTITUTIONAL:  Negative for chills and fever.      CARDIOVASCULAR:  Negative for chest pain and palpitations.      RESPIRATORY:  Negative for recent cough and dyspnea.      GASTROINTESTINAL:  Negative for abdominal pain, nausea and vomiting.      INTEGUMENTARY:  Negative for atypical mole(s) and rash.          Past Medical History / Family History / Social History:         Last Reviewed on 12/12/2019 04:17 PM by Vadim Mishra    Past Medical History:             PAST MEDICAL HISTORY         Hypertension     Renal Stones: dx'd in 2014;         PREVENTIVE HEALTH MAINTENANCE             COLORECTAL CANCER SCREENING: Up to date (colonoscopy q10y; sigmoidoscopy q5y; Cologuard q3y) was last done 11/2013, Results are in chart; colonoscopy with normal results         Surgical History:     NONE         Family History:     Father: Coronary Artery Disease; Hypertension     Mother:  Hypertension     Brother(s): Coronary Artery Disease         Social History:     Occupation: Employed at [employer]     Marital Status:      Children: 1 child         Tobacco/Alcohol/Supplements:     Last Reviewed on 12/12/2019 04:17 PM by Vadim Mishra    Tobacco: He has a past history of cigarette smoking; quit date:  1996.          Substance Abuse History:     Last Reviewed on 12/12/2019 04:17 PM by Vadim Mishra    NEGATIVE         Mental Health History:     Last Reviewed on 12/12/2019 04:17 PM by Vadim Mishra        Communicable Diseases (eg STDs):     Last Reviewed on 12/12/2019 04:17 PM by Vadim Mishra        Current Problems:     Last Reviewed on 12/12/2019 04:17 PM by Vadim Mishra    Essential (primary) hypertension    Mixed hyperlipidemia    Encounter for screening for malignant neoplasm of prostate    Gout, unspecified    Other specified nontoxic goiter    Calculus of kidney        Immunizations:     Hep A, adult dose 1/10/2019    Hep A, adult dose 7/10/2019        Allergies:     Last Reviewed on 12/12/2019 04:17 PM by Vadim Mishra    No Known Allergies.        Current Medications:     Last Reviewed on 12/12/2019 04:17 PM by Vadim Mishra    Losartan 100 mg oral tablet [Take 1 tablet(s) by mouth daily]    Vitamin D3 2,000 unit oral capsule [1 capsule daily]    aspirin 81 mg oral tablet, delayed release (enteric coated) [1 tab 3 x wk]    Vitamin C 500 mg oral tablet [1 tab daily]    Cinnamon 1 tablet daily     ALLOPURINOL  TAB 100MG        Objective:        Vitals:         Current: 12/12/2019 3:51:53 PM    Ht:  5 ft, 10 in;  Wt: 194.6 lbs;  BMI: 27.9T: 97.8 F (oral);  BP: 119/88 mm Hg (left arm, sitting);  P: 83 bpm (left arm (BP Cuff), sitting);  sCr: 0.99 mg/dL;  GFR: 83.36        Exams:     PHYSICAL EXAM:     GENERAL: Vitals recorded well developed, well nourished;     EYES: extraocular movements intact; conjunctiva and cornea are  normal; PERRL;     E/N/T: EARS:  normal external auditory canals and tympanic membranes;  grossly normal hearing; OROPHARYNX:  normal mucosa, dentition, gingiva, and posterior pharynx;     NECK: range of motion is normal; thyroid is non-palpable;     RESPIRATORY: normal respiratory rate and pattern with no distress; normal breath sounds with no rales, rhonchi, wheezes or rubs;     CARDIOVASCULAR: normal rate; rhythm is regular;  no systolic murmur;     GASTROINTESTINAL: nontender; normal bowel sounds; no masses;     LYMPHATIC: no enlargement of cervical or facial nodes; no supraclavicular nodes;     SKIN:  no significant rashes or lesions; no suspicious moles;     NEUROLOGIC: mental status: alert and oriented x 3; cranial nerves II-XII grossly intact;     PSYCHIATRIC: appropriate affect and demeanor; normal psychomotor function;         Assessment:         I10   Essential (primary) hypertension       E78.2   Mixed hyperlipidemia       M10.9   Gout, unspecified       N20.0   Calculus of kidney       R73.01   Impaired fasting glucose           ORDERS:         Meds Prescribed:       [Refilled] losartan 100 mg oral tablet [Take 1 tablet(s) by mouth daily], #90 (ninety) tablets, Refills: 1 (one)       [New Rx] allopurinol 100 mg oral tablet [take 1 tablet (100 mg) by oral route 2 times per day], #90 (ninety) tablets, Refills: 1 (one)         Radiology/Test Orders:       3017F  Colorectal CA screen results documented and reviewed (PV)  (In-House)                      Plan:         Essential (primary) hypertension        RECOMMENDATIONS given include: Today, we have reviewed Gera's care.  I'm going to refill his medications as noted and arrange repeat labs in about 30 days.  He will be for PSA at that time.  We will reach out to [facility] to see if they can fax us his most recent labs.  Otherwise, no changes are anticipated.  He seems well..  MIPS Vaccines Flu and Pneumonia updated in Shot record Colorectal Cancer Screening is  up to date and the results are in the chart           Prescriptions:       [Refilled] losartan 100 mg oral tablet [Take 1 tablet(s) by mouth daily], #90 (ninety) tablets, Refills: 1 (one)           Orders:       [id number]  Colorectal CA screen results documented and reviewed (PV)  (In-House)                Patient Education Handouts:       High Blood Pressure (HTN)          Mixed hyperlipidemiaAs above.        Gout, unspecified          Prescriptions:       [New Rx] allopurinol 100 mg oral tablet [take 1 tablet (100 mg) by oral route 2 times per day], #90 (ninety) tablets, Refills: 1 (one)         Calculus of kidneyAs above.        Impaired fasting glucoseAs above.            Charge Capture:         Primary Diagnosis:     I10  Essential (primary) hypertension           Orders:      [id number]  Office/outpatient visit; established patient, level 4  (In-House)            3017F  Colorectal CA screen results documented and reviewed (PV)  (In-House)              E78.2  Mixed hyperlipidemia     M10.9  Gout, unspecified     N20.0  Calculus of kidney     R73.01  Impaired fasting glucose

## 2021-05-18 NOTE — PROGRESS NOTES
Man Gill EMILYWendi 1965     Office/Outpatient Visit    Visit Date: Thu, Ruy 10, 2019 03:56 pm    Provider: Vadim Mishra MD (Assistant: Nani Garcia RN)    Location: [facility]        Electronically signed by Vadim Mishra MD on  01/12/2019 07:17:36 AM                             SUBJECTIVE:        CC: blood pressure, elevated sugar, prostate         HPI:         Gera presents with essential hypertension.  His current cardiac medication regimen includes an angiotensin receptor blocker ( Cozaar ).  Gera does not check his blood pressure other than at his clinic appointments.  He is tolerating the medication well without side effects.  Compliance with treatment has been fair; he skips some medication doses due to just ran out.          In regard to the mixed hyperlipidemia, current treatment includes a low cholesterol/low fat diet.  Compliance with treatment has been fair.  He denies experiencing any hypercholesterolemia related symptoms.          Gera also has some history of elevated sugar.  He has not been diagnosed with diabetes up to this time, but he is definitely borderline.     ROS:     CONSTITUTIONAL:  Negative for chills and fever.      CARDIOVASCULAR:  Negative for chest pain and palpitations.      RESPIRATORY:  Negative for recent cough and dyspnea.      GASTROINTESTINAL:  Negative for abdominal pain, nausea and vomiting.      INTEGUMENTARY:  Negative for atypical mole(s) and rash.          PMH/FMH/SH:     Last Reviewed on 1/10/2019 04:38 PM by Vadim Mishra    Past Medical History:             PAST MEDICAL HISTORY         Hypertension     Renal Stones: dx'd in 2014;         PREVENTIVE HEALTH MAINTENANCE             COLORECTAL CANCER SCREENING: Up to date (colonoscopy q10y; sigmoidoscopy q5y; Cologuard q3y) was last done 11/2013, Results are in chart; colonoscopy with normal results         Surgical History:     NONE         Family History:         Positive for Coronary  Artery Disease ( father; brother ) and Hypertension ( father; mother ).          Social History:     Occupation: Employed at [employer]     Marital Status:      Children: 1 child         Tobacco/Alcohol/Supplements:     Last Reviewed on 1/10/2019 04:38 PM by Vadim Mishra    Tobacco: He has a past history of cigarette smoking; quit date:  1996.          Substance Abuse History:     Last Reviewed on 1/10/2019 04:38 PM by Vadim Mishra    NEGATIVE         Mental Health History:     Last Reviewed on 1/10/2019 04:38 PM by Vadim Mishra        Communicable Diseases (eg STDs):     Last Reviewed on 1/10/2019 04:38 PM by Vadim Mishra            Current Problems:     Last Reviewed on 1/10/2019 04:38 PM by Vadim Mishra    Kidney stone     Gout, unspecified     Vitamin D deficiency, unspecified     Essential hypertension     Mixed hyperlipidemia     Screening for prostate cancer         Immunizations:     None        Allergies:     Last Reviewed on 1/10/2019 04:38 PM by Vadim Mishra      No Known Drug Allergies.         Current Medications:     Last Reviewed on 1/10/2019 04:38 PM by Vadim Mishra    Losartan 100mg Tablet Take 1 tablet(s) by mouth daily     Potassium Citrate 10mEq Tablets, Extended Release 2 tab tid     Cinnamon 1 tablet daily     Vitamin C 500mg Tablet 1 tab daily     Vitamin D3 2,000IU Capsules 1 capsule daily     Aspirin (ASA) 81mg Tablets, Enteric Coated 1 tab 3 x wk         OBJECTIVE:        Vitals:         Current: 1/10/2019 4:00:33 PM    Ht:  5 ft, 10 in;  Wt: 186.2 lbs;  BMI: 26.7    T: 98.1 F (oral);  BP: 146/98 mm Hg (left arm, sitting);  P: 84 bpm (left arm (BP Cuff), sitting);  sCr: 1.01 mg/dL;  GFR: 81.12        Exams:     PHYSICAL EXAM:     GENERAL: Vitals recorded well developed, well nourished;     EYES: extraocular movements intact; conjunctiva and cornea are normal; PERRL;     E/N/T: EARS:  normal external auditory canals and  tympanic membranes;  grossly normal hearing; OROPHARYNX:  normal mucosa, dentition, gingiva, and posterior pharynx;     NECK: range of motion is normal; thyroid exam reveals left lobe enlargement;     RESPIRATORY: normal respiratory rate and pattern with no distress; normal breath sounds with no rales, rhonchi, wheezes or rubs;     CARDIOVASCULAR: normal rate; rhythm is regular;  no systolic murmur;     GASTROINTESTINAL: nontender; normal bowel sounds; no masses;     LYMPHATIC: no enlargement of cervical or facial nodes; no supraclavicular nodes;     SKIN:  no significant rashes or lesions; no suspicious moles;     PSYCHIATRIC: appropriate affect and demeanor; normal psychomotor function;         Procedures:     Vaccination against hepatitis A     1. Hepatitis A (adult): 1.0 ml given IM in the left upper arm; administered by and;  lot number [id number]; expires 9/11/2021             ASSESSMENT           401.1   I10  Essential hypertension              DDx:     272.2   E78.2  Mixed hyperlipidemia              DDx:     V76.44   Z12.5  Screening for prostate cancer              DDx:     790.6   R73.01  Hyperglycemia              DDx:     240.0   E04.8  Goiter              DDx:     V05.3   Z23  Vaccination against hepatitis A              DDx:         ORDERS:         Meds Prescribed:       Refill of: Losartan 100mg Tablet Take 1 tablet(s) by mouth daily  #90 (Ninety) tablet(s) Refills: 1         Radiology/Test Orders:       [id number]  US, soft tissues of head & neck (eg, thyroid, parathyroid, parotid), real time w/image documentation  (Send-Out)         3017F  Colorectal CA screen results documented and reviewed (PV)  (In-House)           Lab Orders:       [id number]  HTNLP - [facility] CMP AND LIPID: [id number], 10190  (Send-Out)  (PLEASE USE Z00.01 AS PRIMARY CODE - THANKS)       08549  TSH - [facility] TSH  (Send-Out)         78253  PRSAS - [facility] PSA Screen or Medicare screening order:   (Send-Out)         63575  A1CEG - [facility] Hemoglobin A1C   (Send-Out)           Procedures Ordered:       [id number]  Immunization administration; one vaccine  (In-House)         12552  HepA vaccine adult dose for intramuscular use  (In-House)           Other Orders:         Calculated BMI above the upper parameter and a follow-up plan was documented in the medical record  (In-House)                   PLAN:          Essential hypertension         RECOMMENDATIONS given include: Gera seems well today.  He did run out of his blood pressure medication.  I'm going to refill that and ask him to consider using our free blood pressure check on 1/26 and 2/23.  Otherwise, no near term changes are anticipated.  We will follow from there..  MIPS Vaccines Flu and Pneumonia updated in Shot record     COLORECTAL CANCER SCREENING: Results are in chart     BMI Elevated - Follow-Up Plan: He was provided education on weight loss strategies           Prescriptions:       Refill of: Losartan 100mg Tablet Take 1 tablet(s) by mouth daily  #90 (Ninety) tablet(s) Refills: 1           Orders:       3017F  Colorectal CA screen results documented and reviewed (PV)  (In-House)           Calculated BMI above the upper parameter and a follow-up plan was documented in the medical record  (In-House)             Patient Education Handouts:       High Blood Pressure (HTN)           Mixed hyperlipidemia     LABORATORY:  Labs ordered to be performed today include HTN/Lipid Panel: CMP, Lipid and TSH.            Orders:       [id number]  HTN - [facility] CMP AND LIPID: [id number], 83454  (Send-Out)  (PLEASE USE Z00.01 AS PRIMARY CODE - THANKS)       32357  TSH - [facility] TSH  (Send-Out)            Screening for prostate cancer           Orders:       26619  PRSAS - [facility] PSA Screen or Medicare screening order:   (Send-Out)            Hyperglycemia     LABORATORY:  Labs ordered to be performed today include HgbA1C.            Orders:       [id number]  A1CEG - [facility] Hemoglobin A1C  (Send-Out)            Goiter         RADIOLOGY:  I  have ordered Thyroid Ultrasound to be done today.            Orders:       19250  US, soft tissues of head & neck (eg, thyroid, parathyroid, parotid), real time w/image documentation  (Send-Out)            Vaccination against hepatitis A           Orders:       54080  Immunization administration; one vaccine  (In-House)         22716  HepA vaccine adult dose for intramuscular use  (In-House)               CHARGE CAPTURE           **Please note: ICD descriptions below are intended for billing purposes only and may not represent clinical diagnoses**        Primary Diagnosis:         401.1 Essential hypertension            I10    Essential (primary) hypertension              Orders:          79678   Office/outpatient visit; established patient, level 4  (In-House)             3017F   Colorectal CA screen results documented and reviewed (PV)  (In-House)                Calculated BMI above the upper parameter and a follow-up plan was documented in the medical record  (In-House)           272.2 Mixed hyperlipidemia            E78.2    Mixed hyperlipidemia    V76.44 Screening for prostate cancer            Z12.5    Encounter for screening for malignant neoplasm of prostate    790.6 Hyperglycemia            R73.01    Impaired fasting glucose    240.0 Goiter            E04.8    Other specified nontoxic goiter    V05.3 Vaccination against hepatitis A            Z23    Encounter for immunization              Orders:          95796   Immunization administration; one vaccine  (In-House)             42621   HepA vaccine adult dose for intramuscular use  (In-House)

## 2021-05-18 NOTE — PROGRESS NOTES
Man Gill  1965     Office/Outpatient Visit    Visit Date: Mon, Apr 27, 2020 12:50 pm    Provider: Vadim Mishra MD (Assistant: Nani Garcia RN)    Location: [facility]        Electronically signed by Vadim Mishra MD on  04/28/2020 07:22:38 AM                             Subjective:        CC: blood pressure, lipids, gout        TELEMEDICINE VISIT:    - Gera consented to this telemedicine visit.    - Persons present during the telemedicine consultation include:  Gera - patient, Dr. Mishra    - This visit is being conducted over FaceTime with audio and video.    HPI:           Patient to be evaluated for essential (primary) hypertension.  His current cardiac medication regimen includes an angiotensin receptor blocker ( Cozaar ).  Gera does not check his blood pressure other than at his clinic appointments.  He is tolerating the medication well without side effects.  Compliance with treatment has been good; he takes his medication as directed and follows up as directed.            Concerning mixed hyperlipidemia, current treatment includes Lipitor.  Compliance with treatment has been good; he takes his medication as directed and follows up as directed.  He denies experiencing any hypercholesterolemia related symptoms.            With regard to the type 2 diabetes mellitus without complications, most recent lab results include Hemoglobin A1c:  7.3 (%) (01/14/2020), LDL:  128 (mg/dL) (01/14/2020).      ROS:     CONSTITUTIONAL:  Negative for chills and fever.      CARDIOVASCULAR:  Negative for chest pain and palpitations.      RESPIRATORY:  Negative for recent cough and dyspnea.      GASTROINTESTINAL:  Negative for abdominal pain, nausea and vomiting.      INTEGUMENTARY:  Negative for atypical mole(s) and rash.          Past Medical History / Family History / Social History:         Last Reviewed on 12/12/2019 04:17 PM by Vadim Mishra    Past Medical History:              PAST MEDICAL HISTORY         Hypertension     Renal Stones: dx'd in 2014;         PREVENTIVE HEALTH MAINTENANCE             COLORECTAL CANCER SCREENING: Up to date (colonoscopy q10y; sigmoidoscopy q5y; Cologuard q3y) was last done 11/2013, Results are in chart; colonoscopy with normal results         Surgical History:     NONE         Family History:     Father: Coronary Artery Disease; Hypertension     Mother: Hypertension     Brother(s): Coronary Artery Disease         Social History:     Occupation: Employed at [employer]     Marital Status:      Children: 1 child         Tobacco/Alcohol/Supplements:     Last Reviewed on 12/12/2019 04:17 PM by Vadim Mishra    Tobacco: He has a past history of cigarette smoking; quit date:  1996.          Substance Abuse History:     Last Reviewed on 12/12/2019 04:17 PM by Vadim Mishra    NEGATIVE         Mental Health History:     Last Reviewed on 12/12/2019 04:17 PM by Vadim Mishra        Communicable Diseases (eg STDs):     Last Reviewed on 12/12/2019 04:17 PM by Vadim Mishra        Current Problems:     Last Reviewed on 12/12/2019 04:17 PM by Vadim Mishra    Essential (primary) hypertension    Mixed hyperlipidemia    Encounter for screening for malignant neoplasm of prostate    Gout, unspecified    Calculus of kidney    Impaired fasting glucose    Other specified nontoxic goiter        Immunizations:     Hep A, adult dose 1/10/2019    Hep A, adult dose 7/10/2019        Allergies:     Last Reviewed on 4/27/2020 12:51 PM by Nani Garcia    No Known Allergies.        Current Medications:     Last Reviewed on 4/27/2020 12:51 PM by Nani Garcia    losartan 100 mg oral tablet [Take 1 tablet(s) by mouth daily]    Vitamin D3 2,000 unit oral capsule [1 capsule daily]    aspirin 81 mg oral tablet, delayed release (enteric coated) [1 tab 3 x wk]    Vitamin C 500 mg oral tablet [1 tab daily]    Cinnamon 1 tablet daily      allopurinoL 100 mg oral tablet [TAKE 1 TABLET BY MOUTH TWICE DAILY]    atorvastatin 20 mg oral tablet [take 1 tablet (20 mg) by oral route once daily]    permethrin 5 % Topical Cream [apply (thoroughly massage into skin from head to soles of feet) by topical route once leave on for 8-14 hr, then remove by thorough washing]        Objective:        Exams:     PHYSICAL EXAM:     GENERAL: Vitals recorded well developed, well nourished;     EYES: extraocular movements intact; conjunctiva and cornea are normal;     RESPIRATORY: normal respiratory rate and pattern with no distress;     NEUROLOGIC: mental status: alert and oriented x 3;     PSYCHIATRIC: appropriate affect and demeanor; normal psychomotor function;         Assessment:         I10   Essential (primary) hypertension       E78.2   Mixed hyperlipidemia       M10.9   Gout, unspecified       E11.9   Type 2 diabetes mellitus without complications           ORDERS:         Radiology/Test Orders:       3017F  Colorectal CA screen results documented and reviewed (PV)  (In-House)              Lab Orders:       [id number]  DIAB1 - [facility] LIPID,CMP, A1C: 00646, 53607, 40925  (Send-Out)                      Plan:         Essential (primary) hypertension        RECOMMENDATIONS given include: Gera seems to be doing well at this time.  We will update his labs as noted below and follow from there.  We will follow closely..  [facility] Colorectal Cancer Screening is up to date and the results are in the chart           Orders:       3017F  Colorectal CA screen results documented and reviewed (PV)  (In-House)              Mixed hyperlipidemiaAs above.        Gout, unspecifiedAs above.        Type 2 diabetes mellitus without complications    LABORATORY:  Labs ordered to be performed today include Diabetes Panel 1; CMP, Lipid, A1C.            Orders:       [id number]  DIAB1 - [facility] LIPID,CMP, A1C: 28600, 32727, 14946  (Send-Out)                  Charge Capture:         Primary Diagnosis:     I10   Essential (primary) hypertension           Orders:      08216  Office/outpatient visit; established patient, level 4  (In-House)            3017F  Colorectal CA screen results documented and reviewed (PV)  (In-House)              E78.2  Mixed hyperlipidemia     M10.9  Gout, unspecified     E11.9  Type 2 diabetes mellitus without complications

## 2021-05-18 NOTE — PROGRESS NOTES
Man Gill  1965     Office/Outpatient Visit    Visit Date: Mon, Mar 29, 2021 04:04 pm    Provider: Vadim Mishra MD (Assistant: Keyanna Haley,  )    Location: [facility]        Electronically signed by Vadim Mishra MD on  03/31/2021 08:35:53 AM                             Subjective:        CC: diabetes, blood pressure, cholesterol, prostate    HPI:           Patient to be evaluated for type 2 diabetes mellitus without complications.  Current meds include an oral hypoglycemic ( Glucophage XR ).  He does not perform home blood glucose monitoring.  Most recent lab results include Hemoglobin A1c:  7.8 (08/07/2020), LDL:  71 (mg/dL) (04/28/2020).            With regard to the essential (primary) hypertension, his current cardiac medication regimen includes an angiotensin receptor blocker ( Cozaar ).  Gera does not check his blood pressure other than at his clinic appointments.  He is tolerating the medication well without side effects.  Compliance with treatment has been good; he takes his medication as directed and follows up as directed.            Dx with mixed hyperlipidemia; current treatment includes Lipitor.  Compliance with treatment has been good; he takes his medication as directed and follows up as directed.  He denies experiencing any hypercholesterolemia related symptoms.      ROS:     CONSTITUTIONAL:  Negative for chills and fever.      CARDIOVASCULAR:  Negative for chest pain and palpitations.      RESPIRATORY:  Negative for recent cough and dyspnea.      GASTROINTESTINAL:  Negative for abdominal pain, nausea and vomiting.      INTEGUMENTARY:  Negative for atypical mole(s) and rash.          Past Medical History / Family History / Social History:         Last Reviewed on 3/29/2021 04:17 PM by Vadim Mishra    Past Medical History:             PAST MEDICAL HISTORY         Hypertension     Renal Stones: dx'd in 2014;         PREVENTIVE HEALTH  MAINTENANCE             COLORECTAL CANCER SCREENING: Up to date (colonoscopy q10y; sigmoidoscopy q5y; Cologuard q3y) was last done 11/2013, Results are in chart; colonoscopy with normal results         Surgical History:     NONE         Family History:     Father: Coronary Artery Disease; Hypertension     Mother: Hypertension     Brother(s): Coronary Artery Disease         Social History:     Occupation: Employed at [employer]     Marital Status:      Children: 1 child         Tobacco/Alcohol/Supplements:     Last Reviewed on 3/29/2021 04:17 PM by Vadim Mishra    Tobacco: He has a past history of cigarette smoking; quit date:  1996.          Substance Abuse History:     Last Reviewed on 3/29/2021 04:17 PM by Vadim Mishra    NEGATIVE         Mental Health History:     Last Reviewed on 3/29/2021 04:17 PM by Vadim Mishra        Communicable Diseases (eg STDs):     Last Reviewed on 3/29/2021 04:17 PM by Vadim Mishra        Current Problems:     Last Reviewed on 3/29/2021 04:17 PM by Vadim Mishra    Essential (primary) hypertension    Mixed hyperlipidemia    Gout, unspecified    Calculus of kidney    Impaired fasting glucose    Other specified nontoxic goiter    Type 2 diabetes mellitus without complications    Encounter for screening for malignant neoplasm of prostate        Immunizations:     Hep A, adult dose 1/10/2019    Hep A, adult dose 7/10/2019        Allergies:     Last Reviewed on 3/29/2021 04:17 PM by Vadim Mishra    No Known Allergies.        Current Medications:     Last Reviewed on 3/29/2021 04:17 PM by Vadim Msihra    losartan 100 mg oral tablet [TAKE ONE TABLET BY MOUTH EVERY DAY]    Vitamin D3 2,000 unit oral capsule [1 capsule daily]    aspirin 81 mg oral tablet, delayed release (enteric coated) [1 tab 3 x wk]    Vitamin C 500 mg oral tablet [1 tab daily]    Cinnamon 1 tablet daily     allopurinoL 100 mg oral tablet [TAKE TWO TABLETS  BY MOUTH EVERY DAY]    atorvastatin 20 mg oral tablet [take 1 tablet (20 mg) by oral route once daily]    metFORMIN 500 mg oral Tablet, Extended Release 24 hr [TAKE TWO TABLETS BY MOUTH EVERY DAY]    blood-glucose meter  [check sugar once daily Dx E11.9]    Accu-Chek Guide test strips  [USE TO TEST BLOOD SUGAR EVERY DAY]    lancets 32 gauge  [check sugar once daily Dx E11.9]    Farxiga 5 mg oral tablet [take 1 tablet (5 mg) by oral route once daily in the morning]    Farxiga 10 mg oral tablet [take 1 tablet (10 mg) by oral route once daily in the morning]        Objective:        Vitals:         Current: 3/29/2021 4:12:31 PM    Ht:  5 ft, 10 in;  Wt: 200.4 lbs;  BMI: 28.8T: 97.3 F (temporal);  BP: 147/87 mm Hg (left arm, sitting);  P: 84 bpm (left arm (BP Cuff), sitting);  sCr: 1.05 mg/dL;  GFR: 78.66        Repeat:     4:13:27 PM  BP:   144/90mm Hg (left arm, sitting, hr: 92) 4:32:56 PM  BP:   147/89mm Hg (left arm, sitting, HR: 83)     Exams:     PHYSICAL EXAM:     GENERAL: Vitals recorded well developed, well nourished;     EYES: extraocular movements intact; conjunctiva and cornea are normal; PERRL;     E/N/T: EARS:  normal external auditory canals and tympanic membranes;  grossly normal hearing;     NECK: range of motion is normal; thyroid is non-palpable;     RESPIRATORY: normal respiratory rate and pattern with no distress; normal breath sounds with no rales, rhonchi, wheezes or rubs;     CARDIOVASCULAR: normal rate; rhythm is regular;  no systolic murmur;     GASTROINTESTINAL: nontender; normal bowel sounds; no masses;     LYMPHATIC: no enlargement of cervical or facial nodes; no supraclavicular nodes;     SKIN:  no significant rashes or lesions; no suspicious moles;     NEUROLOGIC: mental status: alert and oriented x 3; cranial nerves II-XII grossly intact;     PSYCHIATRIC: appropriate affect and demeanor; normal psychomotor function;         Assessment:         E11.9   Type 2 diabetes mellitus without  complications       I10   Essential (primary) hypertension       E78.2   Mixed hyperlipidemia       Z12.5   Encounter for screening for malignant neoplasm of prostate           ORDERS:         Meds Prescribed:       [Refilled] atorvastatin 20 mg oral tablet [take 1 tablet (20 mg) by oral route once daily], #90 (ninety) tablets, Refills: 1 (one)       [Refilled] allopurinoL 100 mg oral tablet [TAKE TWO TABLETS BY MOUTH EVERY DAY], #180 (one hundred and eighty) tablets, Refills: 1 (one)       [Refilled] losartan 100 mg oral tablet [TAKE ONE TABLET BY MOUTH EVERY DAY], #90 (ninety) tablets, Refills: 1 (one)       [Refilled] metFORMIN 500 mg oral Tablet, Extended Release 24 hr [TAKE TWO TABLETS BY MOUTH EVERY DAY], #180 (one hundred and eighty) tablets, Refills: 1 (one)         Lab Orders:       [id number]  DIAB2 - [facility] CMP A1C LIPID AND MICRO ALBUM CR RATIO: [id number]  (Send-Out)            [id number]  TSH - [facility] TSH  (Send-Out)            [id number]  PRSAS - [facility] PSA Screen or Medicare screening order:   (Send-Out)                      Plan:         Type 2 diabetes mellitus without complications    LABORATORY:  Labs ordered to be performed today include Diabetes Panel 2;CMP, A1C, Lipid, Microalbumin:Creatinine Ratio and TSH.      RECOMMENDATIONS given include: Today, we have reviewed Gera's care.  I'm going to recommend no specific changes at this time.  We will update labs and follow from there.  Consider what we can do to get better blood pressure and diabetes..            Prescriptions:       [Refilled] atorvastatin 20 mg oral tablet [take 1 tablet (20 mg) by oral route once daily], #90 (ninety) tablets, Refills: 1 (one)       [Refilled] allopurinoL 100 mg oral tablet [TAKE TWO TABLETS BY MOUTH EVERY DAY], #180 (one hundred and eighty) tablets, Refills: 1 (one)       [Refilled] losartan 100 mg oral tablet [TAKE ONE TABLET BY MOUTH EVERY DAY], #90 (ninety) tablets, Refills: 1 (one)       [Refilled] metFORMIN  500 mg oral Tablet, Extended Release 24 hr [TAKE TWO TABLETS BY MOUTH EVERY DAY], #180 (one hundred and eighty) tablets, Refills: 1 (one)           Orders:       [id number]  DIAB2 - [facility] CMP A1C LIPID AND MICRO ALBUM CR RATIO: [id number]  (Send-Out)            [id number]  TSH - [facility] TSH  (Send-Out)              Essential (primary) hypertensionAs above.        Mixed hyperlipidemiaAs above.        Encounter for screening for malignant neoplasm of prostate          Orders:       [id number]  PRSAS - [facility] PSA Screen or Medicare screening order:   (Send-Out)                  Charge Capture:         Primary Diagnosis:     E11.9  Type 2 diabetes mellitus without complications           Orders:      [id number]  Office/outpatient visit; established patient, level 4  (In-House)              I10  Essential (primary) hypertension     E78.2  Mixed hyperlipidemia     Z12.5  Encounter for screening for malignant neoplasm of prostate

## 2021-05-24 ENCOUNTER — OFFICE VISIT (OUTPATIENT)
Dept: FAMILY MEDICINE | Age: 56
End: 2021-05-24

## 2021-05-24 ENCOUNTER — LAB SERVICES (OUTPATIENT)
Dept: LAB | Age: 56
End: 2021-05-24

## 2021-05-24 VITALS
SYSTOLIC BLOOD PRESSURE: 124 MMHG | OXYGEN SATURATION: 96 % | WEIGHT: 197.2 LBS | HEIGHT: 66 IN | TEMPERATURE: 97.4 F | RESPIRATION RATE: 14 BRPM | DIASTOLIC BLOOD PRESSURE: 76 MMHG | BODY MASS INDEX: 31.69 KG/M2 | HEART RATE: 62 BPM

## 2021-05-24 DIAGNOSIS — Z00.00 GENERAL MEDICAL EXAM: Primary | ICD-10-CM

## 2021-05-24 DIAGNOSIS — I47.10 SVT (SUPRAVENTRICULAR TACHYCARDIA): ICD-10-CM

## 2021-05-24 DIAGNOSIS — J30.2 SEASONAL ALLERGIES: ICD-10-CM

## 2021-05-24 DIAGNOSIS — Z95.0 PACEMAKER: ICD-10-CM

## 2021-05-24 DIAGNOSIS — G47.31 COMPLEX SLEEP APNEA SYNDROME: ICD-10-CM

## 2021-05-24 DIAGNOSIS — E78.5 HYPERLIPIDEMIA, UNSPECIFIED HYPERLIPIDEMIA TYPE: ICD-10-CM

## 2021-05-24 DIAGNOSIS — M54.31 SCIATICA OF RIGHT SIDE: ICD-10-CM

## 2021-05-24 DIAGNOSIS — I49.5 SINOATRIAL NODE DYSFUNCTION (CMD): ICD-10-CM

## 2021-05-24 LAB
ALBUMIN SERPL-MCNC: 3.8 G/DL (ref 3.6–5.1)
ALBUMIN/GLOB SERPL: 1.3 {RATIO} (ref 1–2.4)
ALP SERPL-CCNC: 90 UNITS/L (ref 45–117)
ALT SERPL-CCNC: 32 UNITS/L
ANION GAP SERPL CALC-SCNC: 7 MMOL/L (ref 10–20)
AST SERPL-CCNC: 20 UNITS/L
BILIRUB SERPL-MCNC: 1.1 MG/DL (ref 0.2–1)
BUN SERPL-MCNC: 9 MG/DL (ref 6–20)
BUN/CREAT SERPL: 8 (ref 7–25)
CALCIUM SERPL-MCNC: 8.9 MG/DL (ref 8.4–10.2)
CHLORIDE SERPL-SCNC: 106 MMOL/L (ref 98–107)
CHOLEST SERPL-MCNC: 209 MG/DL
CHOLEST/HDLC SERPL: 4.6 {RATIO}
CO2 SERPL-SCNC: 31 MMOL/L (ref 21–32)
CREAT SERPL-MCNC: 1.06 MG/DL (ref 0.67–1.17)
FASTING DURATION TIME PATIENT: 12 HOURS
FASTING DURATION TIME PATIENT: 12 HOURS
GFR SERPLBLD BASED ON 1.73 SQ M-ARVRAT: 78 ML/MIN/1.73M2
GLOBULIN SER-MCNC: 3 G/DL (ref 2–4)
GLUCOSE SERPL-MCNC: 90 MG/DL (ref 65–99)
HDLC SERPL-MCNC: 45 MG/DL
LDLC SERPL CALC-MCNC: 138 MG/DL
NONHDLC SERPL-MCNC: 164 MG/DL
POTASSIUM SERPL-SCNC: 4.3 MMOL/L (ref 3.4–5.1)
PROT SERPL-MCNC: 6.8 G/DL (ref 6.4–8.2)
SODIUM SERPL-SCNC: 140 MMOL/L (ref 135–145)
TRIGL SERPL-MCNC: 131 MG/DL

## 2021-05-24 PROCEDURE — 99396 PREV VISIT EST AGE 40-64: CPT | Performed by: PHYSICIAN ASSISTANT

## 2021-05-24 PROCEDURE — 36415 COLL VENOUS BLD VENIPUNCTURE: CPT | Performed by: INTERNAL MEDICINE

## 2021-05-24 PROCEDURE — 80053 COMPREHEN METABOLIC PANEL: CPT | Performed by: INTERNAL MEDICINE

## 2021-05-24 PROCEDURE — 80061 LIPID PANEL: CPT | Performed by: INTERNAL MEDICINE

## 2021-05-24 RX ORDER — ZOLPIDEM TARTRATE 10 MG/1
10 TABLET ORAL NIGHTLY
Qty: 90 TABLET | Refills: 1 | Status: CANCELLED | OUTPATIENT
Start: 2021-05-24

## 2021-05-24 RX ORDER — FLUTICASONE PROPIONATE 50 MCG
2 SPRAY, SUSPENSION (ML) NASAL DAILY
Qty: 48 ML | Refills: 11 | Status: SHIPPED | OUTPATIENT
Start: 2021-05-24 | End: 2022-04-12 | Stop reason: SDUPTHER

## 2021-05-24 RX ORDER — TADALAFIL 20 MG/1
20 TABLET ORAL PRN
Qty: 30 TABLET | Refills: 11 | Status: SHIPPED | OUTPATIENT
Start: 2021-05-24 | End: 2021-05-24 | Stop reason: SDUPTHER

## 2021-05-24 RX ORDER — PRAVASTATIN SODIUM 40 MG
40 TABLET ORAL DAILY
Qty: 90 TABLET | Refills: 1 | Status: SHIPPED | OUTPATIENT
Start: 2021-05-24 | End: 2022-02-14

## 2021-05-24 RX ORDER — TADALAFIL 20 MG/1
20 TABLET ORAL PRN
Qty: 30 TABLET | Refills: 11 | Status: SHIPPED | OUTPATIENT
Start: 2021-05-24 | End: 2022-04-12 | Stop reason: SDUPTHER

## 2021-05-24 RX ORDER — DICLOFENAC SODIUM 75 MG/1
75 TABLET, DELAYED RELEASE ORAL 2 TIMES DAILY
Qty: 60 TABLET | Refills: 3 | Status: SHIPPED | OUTPATIENT
Start: 2021-05-24 | End: 2021-10-04

## 2021-05-24 RX ORDER — BACLOFEN 10 MG/1
10 TABLET ORAL 3 TIMES DAILY
Qty: 60 TABLET | Refills: 3 | Status: SHIPPED | OUTPATIENT
Start: 2021-05-24 | End: 2022-05-27 | Stop reason: ALTCHOICE

## 2021-05-24 ASSESSMENT — ENCOUNTER SYMPTOMS
CHILLS: 0
BACK PAIN: 1
NAUSEA: 0
WEAKNESS: 0
FEVER: 0
COUGH: 0
WHEEZING: 0
SORE THROAT: 0
VOMITING: 0
NUMBNESS: 0
RHINORRHEA: 0
SLEEP DISTURBANCE: 0
SHORTNESS OF BREATH: 0
DIZZINESS: 0
FATIGUE: 0
DIARRHEA: 0
HEADACHES: 0
DIAPHORESIS: 0
SINUS PRESSURE: 1

## 2021-05-24 ASSESSMENT — PATIENT HEALTH QUESTIONNAIRE - PHQ9
SUM OF ALL RESPONSES TO PHQ9 QUESTIONS 1 AND 2: 0
CLINICAL INTERPRETATION OF PHQ9 SCORE: NO FURTHER SCREENING NEEDED
CLINICAL INTERPRETATION OF PHQ2 SCORE: NO FURTHER SCREENING NEEDED
2. FEELING DOWN, DEPRESSED OR HOPELESS: NOT AT ALL
SUM OF ALL RESPONSES TO PHQ9 QUESTIONS 1 AND 2: 0
1. LITTLE INTEREST OR PLEASURE IN DOING THINGS: NOT AT ALL

## 2021-06-08 ENCOUNTER — REMOTE DEVICE CHECK (OUTPATIENT)
Dept: ELECTROPHYSIOLOGY | Age: 56
End: 2021-06-08

## 2021-06-08 DIAGNOSIS — I49.5 SINOATRIAL NODE DYSFUNCTION (CMD): Primary | ICD-10-CM

## 2021-06-08 DIAGNOSIS — Z95.0 PACEMAKER: ICD-10-CM

## 2021-06-08 PROCEDURE — 93296 REM INTERROG EVL PM/IDS: CPT | Performed by: INTERNAL MEDICINE

## 2021-06-08 PROCEDURE — 93294 REM INTERROG EVL PM/LDLS PM: CPT | Performed by: INTERNAL MEDICINE

## 2021-06-12 DIAGNOSIS — E11.9 TYPE 2 DIABETES MELLITUS WITHOUT COMPLICATIONS (HCC): ICD-10-CM

## 2021-06-14 RX ORDER — LANCETS
EACH MISCELLANEOUS
Qty: 100 EACH | Refills: 3 | Status: SHIPPED | OUTPATIENT
Start: 2021-06-14 | End: 2021-10-05 | Stop reason: SDUPTHER

## 2021-06-14 RX ORDER — BLOOD SUGAR DIAGNOSTIC
STRIP MISCELLANEOUS
Qty: 100 EACH | Refills: 3 | Status: SHIPPED | OUTPATIENT
Start: 2021-06-14 | End: 2021-10-05 | Stop reason: SDUPTHER

## 2021-06-21 ENCOUNTER — TELEPHONE (OUTPATIENT)
Dept: ELECTROPHYSIOLOGY | Age: 56
End: 2021-06-21

## 2021-06-22 DIAGNOSIS — F51.01 PRIMARY INSOMNIA: ICD-10-CM

## 2021-06-22 RX ORDER — ZOLPIDEM TARTRATE 10 MG/1
TABLET ORAL
Qty: 90 TABLET | Refills: 1 | Status: SHIPPED | OUTPATIENT
Start: 2021-06-22 | End: 2022-04-12 | Stop reason: SDUPTHER

## 2021-07-01 VITALS
HEIGHT: 70 IN | TEMPERATURE: 97.8 F | BODY MASS INDEX: 27.86 KG/M2 | DIASTOLIC BLOOD PRESSURE: 88 MMHG | SYSTOLIC BLOOD PRESSURE: 119 MMHG | WEIGHT: 194.6 LBS | HEART RATE: 83 BPM

## 2021-07-01 VITALS
HEART RATE: 84 BPM | BODY MASS INDEX: 26.66 KG/M2 | DIASTOLIC BLOOD PRESSURE: 98 MMHG | TEMPERATURE: 98.1 F | SYSTOLIC BLOOD PRESSURE: 146 MMHG | HEIGHT: 70 IN | WEIGHT: 186.2 LBS

## 2021-07-02 VITALS
BODY MASS INDEX: 26.37 KG/M2 | HEART RATE: 86 BPM | TEMPERATURE: 97.4 F | SYSTOLIC BLOOD PRESSURE: 110 MMHG | WEIGHT: 184.2 LBS | HEIGHT: 70 IN | DIASTOLIC BLOOD PRESSURE: 83 MMHG

## 2021-07-02 VITALS
TEMPERATURE: 97.3 F | WEIGHT: 200.4 LBS | HEART RATE: 84 BPM | DIASTOLIC BLOOD PRESSURE: 89 MMHG | SYSTOLIC BLOOD PRESSURE: 147 MMHG | BODY MASS INDEX: 28.69 KG/M2 | HEIGHT: 70 IN

## 2021-08-24 ENCOUNTER — TELEPHONE (OUTPATIENT)
Dept: PULMONOLOGY | Age: 56
End: 2021-08-24

## 2021-08-30 ENCOUNTER — TELEPHONE (OUTPATIENT)
Dept: FAMILY MEDICINE CLINIC | Age: 56
End: 2021-08-30

## 2021-08-30 RX ORDER — LOSARTAN POTASSIUM 100 MG/1
100 TABLET ORAL DAILY
COMMUNITY
Start: 2021-06-21 | End: 2021-09-20

## 2021-08-30 RX ORDER — ATORVASTATIN CALCIUM 20 MG/1
20 TABLET, FILM COATED ORAL DAILY
COMMUNITY
Start: 2021-06-21 | End: 2021-09-20

## 2021-08-30 RX ORDER — EMPAGLIFLOZIN 25 MG/1
25 TABLET, FILM COATED ORAL EVERY MORNING
COMMUNITY
Start: 2021-08-04 | End: 2021-10-05 | Stop reason: SDUPTHER

## 2021-08-30 RX ORDER — METFORMIN HYDROCHLORIDE 500 MG/1
1000 TABLET, EXTENDED RELEASE ORAL DAILY
COMMUNITY
Start: 2021-06-21 | End: 2021-09-20

## 2021-08-30 RX ORDER — ALLOPURINOL 100 MG/1
200 TABLET ORAL DAILY
COMMUNITY
Start: 2021-06-21 | End: 2021-09-20

## 2021-08-31 NOTE — TELEPHONE ENCOUNTER
He is past due for follow-up.  Please reach out to him and arrange a fingerstick A1c in our fast lab.  Thanks.

## 2021-09-03 ENCOUNTER — CLINICAL SUPPORT (OUTPATIENT)
Dept: FAMILY MEDICINE CLINIC | Age: 56
End: 2021-09-03

## 2021-09-03 DIAGNOSIS — E11.9 TYPE 2 DIABETES MELLITUS WITHOUT COMPLICATION, UNSPECIFIED WHETHER LONG TERM INSULIN USE (HCC): Primary | ICD-10-CM

## 2021-09-03 LAB — HBA1C MFR BLD: 7.4 %

## 2021-09-09 ENCOUNTER — REMOTE DEVICE CHECK (OUTPATIENT)
Dept: ELECTROPHYSIOLOGY | Age: 56
End: 2021-09-09

## 2021-09-09 DIAGNOSIS — Z95.0 PACEMAKER: Primary | ICD-10-CM

## 2021-09-09 PROCEDURE — 93294 REM INTERROG EVL PM/LDLS PM: CPT | Performed by: INTERNAL MEDICINE

## 2021-09-09 PROCEDURE — 93296 REM INTERROG EVL PM/IDS: CPT | Performed by: INTERNAL MEDICINE

## 2021-09-18 DIAGNOSIS — E11.9 TYPE 2 DIABETES MELLITUS WITHOUT COMPLICATIONS (HCC): ICD-10-CM

## 2021-09-18 DIAGNOSIS — I10 ESSENTIAL (PRIMARY) HYPERTENSION: ICD-10-CM

## 2021-09-18 DIAGNOSIS — M10.9 GOUT, UNSPECIFIED: ICD-10-CM

## 2021-09-20 RX ORDER — ATORVASTATIN CALCIUM 20 MG/1
TABLET, FILM COATED ORAL
Qty: 90 TABLET | Refills: 0 | Status: SHIPPED | OUTPATIENT
Start: 2021-09-20 | End: 2021-10-05 | Stop reason: SDUPTHER

## 2021-09-20 RX ORDER — LOSARTAN POTASSIUM 100 MG/1
TABLET ORAL
Qty: 90 TABLET | Refills: 0 | Status: SHIPPED | OUTPATIENT
Start: 2021-09-20 | End: 2021-10-05 | Stop reason: SDUPTHER

## 2021-09-20 RX ORDER — METFORMIN HYDROCHLORIDE 500 MG/1
TABLET, EXTENDED RELEASE ORAL
Qty: 180 TABLET | Refills: 0 | Status: SHIPPED | OUTPATIENT
Start: 2021-09-20 | End: 2021-10-05 | Stop reason: SDUPTHER

## 2021-09-20 RX ORDER — ALLOPURINOL 100 MG/1
TABLET ORAL
Qty: 180 TABLET | Refills: 0 | Status: SHIPPED | OUTPATIENT
Start: 2021-09-20 | End: 2021-10-05 | Stop reason: SDUPTHER

## 2021-09-22 RX ORDER — ATORVASTATIN CALCIUM 20 MG/1
TABLET, FILM COATED ORAL
Qty: 90 TABLET | Refills: 1 | OUTPATIENT
Start: 2021-09-22

## 2021-10-01 ENCOUNTER — TELEPHONE (OUTPATIENT)
Dept: CARDIOLOGY | Age: 56
End: 2021-10-01

## 2021-10-04 RX ORDER — DICLOFENAC SODIUM 75 MG/1
TABLET, DELAYED RELEASE ORAL
Qty: 60 TABLET | Refills: 3 | Status: SHIPPED | OUTPATIENT
Start: 2021-10-04 | End: 2022-04-12 | Stop reason: ALTCHOICE

## 2021-10-05 ENCOUNTER — OFFICE VISIT (OUTPATIENT)
Dept: FAMILY MEDICINE CLINIC | Age: 56
End: 2021-10-05

## 2021-10-05 VITALS
TEMPERATURE: 98.4 F | SYSTOLIC BLOOD PRESSURE: 137 MMHG | HEIGHT: 70 IN | BODY MASS INDEX: 27.8 KG/M2 | DIASTOLIC BLOOD PRESSURE: 81 MMHG | WEIGHT: 194.2 LBS | HEART RATE: 97 BPM

## 2021-10-05 DIAGNOSIS — E78.2 MIXED HYPERLIPIDEMIA: ICD-10-CM

## 2021-10-05 DIAGNOSIS — I10 ESSENTIAL (PRIMARY) HYPERTENSION: ICD-10-CM

## 2021-10-05 DIAGNOSIS — R35.0 URINARY FREQUENCY: ICD-10-CM

## 2021-10-05 DIAGNOSIS — E11.9 TYPE 2 DIABETES MELLITUS WITHOUT COMPLICATION, WITHOUT LONG-TERM CURRENT USE OF INSULIN (HCC): Primary | ICD-10-CM

## 2021-10-05 DIAGNOSIS — M1A.0720 IDIOPATHIC CHRONIC GOUT OF LEFT FOOT WITHOUT TOPHUS: ICD-10-CM

## 2021-10-05 PROCEDURE — 99214 OFFICE O/P EST MOD 30 MIN: CPT | Performed by: FAMILY MEDICINE

## 2021-10-05 PROCEDURE — 81001 URINALYSIS AUTO W/SCOPE: CPT | Performed by: FAMILY MEDICINE

## 2021-10-05 RX ORDER — LOSARTAN POTASSIUM 100 MG/1
100 TABLET ORAL DAILY
Qty: 90 TABLET | Refills: 1 | Status: SHIPPED | OUTPATIENT
Start: 2021-10-05 | End: 2022-01-14

## 2021-10-05 RX ORDER — LANCETS
EACH MISCELLANEOUS
Qty: 100 EACH | Refills: 3 | Status: SHIPPED | OUTPATIENT
Start: 2021-10-05 | End: 2022-08-18 | Stop reason: SDUPTHER

## 2021-10-05 RX ORDER — METFORMIN HYDROCHLORIDE 500 MG/1
1000 TABLET, EXTENDED RELEASE ORAL DAILY
Qty: 180 TABLET | Refills: 1 | Status: SHIPPED | OUTPATIENT
Start: 2021-10-05 | End: 2022-01-14

## 2021-10-05 RX ORDER — ALLOPURINOL 100 MG/1
200 TABLET ORAL DAILY
Qty: 180 TABLET | Refills: 1 | Status: SHIPPED | OUTPATIENT
Start: 2021-10-05 | End: 2022-05-17 | Stop reason: SDUPTHER

## 2021-10-05 RX ORDER — EMPAGLIFLOZIN 25 MG/1
25 TABLET, FILM COATED ORAL EVERY MORNING
Qty: 90 TABLET | Refills: 1 | Status: SHIPPED | OUTPATIENT
Start: 2021-10-05 | End: 2022-05-17 | Stop reason: SDUPTHER

## 2021-10-05 RX ORDER — ATORVASTATIN CALCIUM 20 MG/1
20 TABLET, FILM COATED ORAL DAILY
Qty: 90 TABLET | Refills: 1 | Status: SHIPPED | OUTPATIENT
Start: 2021-10-05 | End: 2022-04-20

## 2021-10-05 RX ORDER — BLOOD SUGAR DIAGNOSTIC
STRIP MISCELLANEOUS
Qty: 100 EACH | Refills: 3 | Status: SHIPPED | OUTPATIENT
Start: 2021-10-05 | End: 2022-08-18 | Stop reason: SDUPTHER

## 2021-10-05 NOTE — PATIENT INSTRUCTIONS
Diabetes Mellitus Basics    Diabetes mellitus, or diabetes, is a long-term (chronic) disease. It occurs when the body does not properly use sugar (glucose) that is released from food after you eat.  Diabetes mellitus may be caused by one or both of these problems:  · Your pancreas does not make enough of a hormone called insulin.  · Your body does not react in a normal way to the insulin that it makes.  Insulin lets glucose enter cells in your body. This gives you energy. If you have diabetes, glucose cannot get into cells. This causes high blood glucose (hyperglycemia).  How to treat and manage diabetes  You may need to take insulin or other diabetes medicines daily to keep your glucose in balance. If you are prescribed insulin, you will learn how to give yourself insulin by injection. You may need to adjust the amount of insulin you take based on the foods that you eat.  You will need to check your blood glucose levels using a glucose monitor as told by your health care provider. The readings can help determine if you have low or high blood glucose.  Generally, you should have these blood glucose levels:  · Before meals (preprandial):  mg/dL (4.4-7.2 mmol/L).  · After meals (postprandial): below 180 mg/dL (10 mmol/L).  · Hemoglobin A1c (HbA1c) level: less than 7%.  Your health care provider will set treatment goals for you.  Keep all follow-up visits. This is important.  Follow these instructions at home:  Diabetes medicines  Take your diabetes medicines every day as told by your health care provider. List your diabetes medicines here:  · Name of medicine: ______________________________  ? Amount (dose): _______________ Time (a.m./p.m.): _______________ Notes: ___________________________________  · Name of medicine: ______________________________  ? Amount (dose): _______________ Time (a.m./p.m.): _______________ Notes: ___________________________________  · Name of medicine:  ______________________________  ? Amount (dose): _______________ Time (a.m./p.m.): _______________ Notes: ___________________________________  Insulin  If you use insulin, list the types of insulin you use here:  · Insulin type: ______________________________  ? Amount (dose): _______________ Time (a.m./p.m.): _______________Notes: ___________________________________  · Insulin type: ______________________________  ? Amount (dose): _______________ Time (a.m./p.m.): _______________ Notes: ___________________________________  · Insulin type: ______________________________  ? Amount (dose): _______________ Time (a.m./p.m.): _______________ Notes: ___________________________________  · Insulin type: ______________________________  ? Amount (dose): _______________ Time (a.m./p.m.): _______________ Notes: ___________________________________  · Insulin type: ______________________________  ? Amount (dose): _______________ Time (a.m./p.m.): _______________ Notes: ___________________________________  Managing blood glucose    Check your blood glucose levels using a glucose monitor as told by your health care provider.  Write down the times that you check your glucose levels here:  · Time: _______________ Notes: ___________________________________  · Time: _______________ Notes: ___________________________________  · Time: _______________ Notes: ___________________________________  · Time: _______________ Notes: ___________________________________  · Time: _______________ Notes: ___________________________________  · Time: _______________ Notes: ___________________________________    Low blood glucose  Low blood glucose (hypoglycemia) is when glucose is at or below 70 mg/dL (3.9 mmol/L). Symptoms may include:  · Feeling:  ? Hungry.  ? Sweaty and clammy.  ? Irritable or easily upset.  ? Dizzy.  ? Sleepy.  · Having:  ? A fast heartbeat.  ? A headache.  ? A change in your vision.  ? Numbness around the mouth, lips, or  tongue.  · Having trouble with:  ? Moving (coordination).  ? Sleeping.  Treating low blood glucose  To treat low blood glucose, eat or drink something containing sugar right away. If you can think clearly and swallow safely, follow the 15:15 rule:  · Take 15 grams of a fast-acting carb (carbohydrate), as told by your health care provider.  · Some fast-acting carbs are:  ? Glucose tablets: take 3-4 tablets.  ? Hard candy: eat 3-5 pieces.  ? Fruit juice: drink 4 oz (120 mL).  ? Regular (not diet) soda: drink 4-6 oz (120-180 mL).  ? Honey or sugar: eat 1 Tbsp (15 mL).  · Check your blood glucose levels 15 minutes after you take the carb.  · If your glucose is still at or below 70 mg/dL (3.9 mmol/L), take 15 grams of a carb again.  · If your glucose does not go above 70 mg/dL (3.9 mmol/L) after 3 tries, get help right away.  · After your glucose goes back to normal, eat a meal or a snack within 1 hour.  Treating very low blood glucose  If your glucose is at or below 54 mg/dL (3 mmol/L), you have very low blood glucose (severe hypoglycemia).  This is an emergency. Do not wait to see if the symptoms will go away. Get medical help right away. Call your local emergency services (911 in the [place] Do not drive yourself to the hospital.  Questions to ask your health care provider  · Should I talk with a diabetes educator?  · What equipment will I need to care for myself at home?  · What diabetes medicines do I need? When should I take them?  · How often do I need to check my blood glucose levels?  · What number can I call if I have questions?  · When is my follow-up visit?  · Where can I find a support group for people with diabetes?  Where to find more information  · American Diabetes Association: www.diabetes.org  · Association of Diabetes Care and Education Specialists: www.diabeteseducator.org  Contact a health care provider if:  · Your blood glucose is at or above 240 mg/dL (13.3 mmol/L) for 2 days in a row.  · You have  been sick or have had a fever for 2 days or more, and you are not getting better.  · You have any of these problems for more than 6 hours:  ? You cannot eat or drink.  ? You feel nauseous.  ? You vomit.  ? You have diarrhea.  Get help right away if:  · Your blood glucose is lower than 54 mg/dL (3 mmol/L).  · You get confused.  · You have trouble thinking clearly.  · You have trouble breathing.  These symptoms may represent a serious problem that is an emergency. Do not wait to see if the symptoms will go away. Get medical help right away. Call your local emergency services (911 in the [place] Do not drive yourself to the hospital.  Summary  · Diabetes mellitus is a chronic disease that occurs when the body does not properly use sugar (glucose) that is released from food after you eat.  · Take insulin and diabetes medicines as told.  · Check your blood glucose every day, as often as told.  · Keep all follow-up visits. This is important.  This information is not intended to replace advice given to you by your health care provider. Make sure you discuss any questions you have with your health care provider.  Document Revised: 04/20/2021 Document Reviewed: 04/20/2021  [employer] Patient Education © 2021 [employer]    Rosacea  Rosacea is a long-term (chronic) condition that affects the skin of the face, including the cheeks, nose, forehead, and chin. This condition can also affect the eyes. Rosacea causes blood vessels near the surface of the skin to get bigger (be enlarged), and that makes the skin red.  What are the causes?  The cause of this condition is not known. Certain things can make rosacea worse, including:  · Hot baths.  · Exercise.  · Sunlight.  · Very hot or cold temperatures.  · Hot or spicy foods and drinks.  · Drinking alcohol.  · Stress.  · Taking blood pressure medicine.  · Long-term use of topical steroids on the face.  What increases the risk?  You are more likely to get this condition if you:  · Are  older than 30 years of age.  · Are a woman.  · Have light-colored skin (light complexion).  · Have a family history of the condition.  What are the signs or symptoms?    · Redness of the face.  · Red bumps or pimples on the face.  · A red, enlarged nose.  · Blushing easily.  · Red lines on the skin.  · Irritated, burning, or itchy feeling in the eyes.  · Swollen eyelids.  · Drainage from the eyes.  · Feeling like there is something in your eye.  How is this treated?  There is no cure for this condition, but treatment can help to control your symptoms. Your doctor may suggest that you see a skin specialist (dermatologist). Treatment may include:  · Medicines that are put on the skin or taken by mouth (orally).  · Laser treatment to improve how the skin looks.  · Surgery. This is rare.  Your doctor will also suggest the best way to take care of your skin. Even after your skin gets better, you will likely need to continue treatment to keep your rosacea from coming back.  Follow these instructions at home:  Skin care  Take care of your skin as told by your doctor. Your doctor may tell you to do these things:  · Wash your skin gently two or more times each day.  · Use mild soap.  · Use a sunscreen or sunblock with SPF 30 or greater.  · Use gentle cosmetics that are meant for sensitive skin.  · Shave with an electric shaver instead of a blade.  Lifestyle  · Try to keep track of what foods make this condition worse. Avoid those foods. These may include:  ? Spicy foods.  ? Seafood.  ? Cheese.  ? Hot liquids.  ? Nuts.  ? Chocolate.  ? Iodized salt.  · Do not drink alcohol.  · Avoid very cold or hot temperatures.  · Try to reduce your stress. If you need help to do this, talk with your doctor.  · When you exercise, do these things to stay cool:  ? Limit sun exposure to your face.  ? Use a fan.  ? Exercise for a shorter time, and exercise more often.  General instructions  · Take and apply over-the-counter and prescription  medicines only as told by your doctor.  · If you were prescribed an antibiotic medicine, apply it or take it as told by your doctor. Do not stop using the antibiotic even if your condition improves.  · If your eyelids are affected, hold warm compresses on them. Do this as told by your doctor.  · Keep all follow-up visits as told by your doctor. This is important.  Contact a doctor if:  · Your symptoms get worse.  · Your symptoms do not improve after 2 months of treatment.  · You have new symptoms.  · You have any changes in how you see (vision) or you have problems with your eyes, such as redness or itching.  · You feel very sad (depressed).  · You do not want to eat as much as normal (lose your appetite).  · You have trouble focusing your mind (concentrating).  Summary  · Rosacea is a long-term condition that affects the skin of the face, including the cheeks, nose, forehead, and chin.  · Take care of your skin as told by your doctor.  · Take and apply medicines only as told by your doctor.  · Contact a doctor if your symptoms get worse or if you have problems with your eyes.  This information is not intended to replace advice given to you by your health care provider. Make sure you discuss any questions you have with your health care provider.  Document Revised: 05/22/2019 Document Reviewed: 05/22/2019  [employer] Patient Education © 2021 [employer]

## 2021-10-05 NOTE — PROGRESS NOTES
"Man Gill presents to [facility].    Chief Complaint:  Diabetes follow up, blood pressure, cholesterol, urinary frequency    Subjective       History of Present Illness:  Gera is in today for follow-up on a couple of different issues.  First he has type 2 diabetes for which he currently takes Jardiance and Metformin.  He does not check his sugar on a regular basis.  He says he checks it \"sometimes\".  He has not checked it for example in the last couple of weeks.  He did have recent blood work that showed his A1c to be 7.4%.  He also admits that he does not watch his diet carefully.    He also has history of hypertension and elevated cholesterol.  He remains on treatment for these issues.    Review of Systems:  Review of Systems   Constitutional: Negative for chills and fever.   Respiratory: Negative for cough and shortness of breath.    Cardiovascular: Negative for chest pain and palpitations.   Gastrointestinal: Negative for abdominal pain, nausea and vomiting.      Objective   Medical History:  PAST MEDICAL HISTORY         Hypertension     Renal Stones: dx'd in 2014;         PREVENTIVE HEALTH MAINTENANCE             COLORECTAL CANCER SCREENING: Up to date (colonoscopy q10y; sigmoidoscopy q5y; Cologuard q3y) was last done 11/2013, Results are in chart; colonoscopy with normal results         Surgical History:     NONE         Family History:     Father: Coronary Artery Disease; Hypertension     Mother: Hypertension     Brother(s): Coronary Artery Disease         Social History:     Occupation: Employed at [employer]     Marital Status:      Children: 1 child         Tobacco/Alcohol/Supplements:     Last Reviewed on 3/29/2021 04:17 PM by Vadim Mishra    Tobacco: He has a past history of cigarette smoking; quit date:  1996.    Social History     Tobacco Use   • Smoking status: Former Smoker     Types: Cigarettes   • Smokeless tobacco: Never Used   Substance Use Topics " "  • Alcohol use: Not on file       Health Maintenance Due   Topic Date Due   • ANNUAL PHYSICAL  Never done   • Hepatitis B (1 of 3 - Risk 3-dose series) Never done   • ZOSTER VACCINE (1 of 2) Never done   • HEPATITIS C SCREENING  Never done   • DIABETIC EYE EXAM  Never done   • LIPID PANEL  10/05/2021        Immunization History   Administered Date(s) Administered   • Td 07/09/2004   • Tdap 08/10/2012       No Known Allergies     Medications:  Current Outpatient Medications on File Prior to Visit   Medication Sig   • Aspirin Buf,CaCarb-MgCarb-MgO, 81 MG tablet Take 81 mg by mouth 3 (Three) Times a Week.   • [DISCONTINUED] Accu-Chek FastClix Lancets misc USE TO CHECK SUGAR EVERY DAY   • [DISCONTINUED] Accu-Chek Guide test strip USE TO test blood sugar EVERY DAY   • [DISCONTINUED] allopurinol (ZYLOPRIM) 100 MG tablet TAKE TWO TABLETS BY MOUTH EVERY DAY   • [DISCONTINUED] atorvastatin (LIPITOR) 20 MG tablet TAKE ONE TABLET BY MOUTH EVERY DAY   • [DISCONTINUED] Jardiance 25 MG tablet tablet Take 25 mg by mouth Every Morning.   • [DISCONTINUED] losartan (COZAAR) 100 MG tablet TAKE ONE TABLET BY MOUTH EVERY DAY   • [DISCONTINUED] metFORMIN ER (GLUCOPHAGE-XR) 500 MG 24 hr tablet TAKE TWO TABLETS BY MOUTH EVERY DAY     No current facility-administered medications on file prior to visit.       Vital Signs:   /81 (BP Location: Left arm, Patient Position: Sitting)   Pulse 97   Temp 98.4 °F (36.9 °C) (Oral)   Ht 177.8 cm (70\")   Wt 88.1 kg (194 lb 3.2 oz)   BMI 27.86 kg/m²       Physical Exam:  Physical Exam  Vitals reviewed.   Constitutional:       General: He is not in acute distress.     Appearance: He is not ill-appearing.   Eyes:      Pupils: Pupils are equal, round, and reactive to light.   Neck:      Comments: No thyromegaly  Cardiovascular:      Rate and Rhythm: Normal rate and regular rhythm.      Pulses:           Dorsalis pedis pulses are 2+ on the right side and 2+ on the left side.   Pulmonary:      " Effort: Pulmonary effort is normal.      Breath sounds: Normal breath sounds.   Abdominal:      General: There is no distension.      Palpations: Abdomen is soft.      Tenderness: There is no abdominal tenderness.   Musculoskeletal:      Cervical back: Neck supple.   Feet:      Right foot:      Protective Sensation: 3 sites tested. 3 sites sensed.      Skin integrity: Callus present.      Left foot:      Protective Sensation: 3 sites tested. 3 sites sensed.      Skin integrity: Callus present.   Lymphadenopathy:      Cervical: No cervical adenopathy.   Skin:     Findings: No lesion or rash.   Neurological:      Mental Status: He is alert.       Result Review      The following data was reviewed by Vadim Mishra MD on 10/05/2021.  No results found for: WBC, HGB, HCT, MCV, PLT  Lab Results   Component Value Date    BUN 15 04/03/2021    CREATININE 1.06 04/03/2021    BCR 14 04/03/2021    K 4.9 04/03/2021    CO2 21 (L) 04/03/2021    CALCIUM 9.1 04/03/2021    ALBUMIN 4.3 04/03/2021    LABIL2 1.5 04/03/2021    AST 31 04/03/2021    ALT 42 (H) 04/03/2021     Lab Results   Component Value Date    CHLPL 151 04/03/2021    TRIG 98 04/03/2021    HDL 37 (L) 04/03/2021    LDL 94 04/03/2021     Lab Results   Component Value Date    TSH 1.560 04/03/2021     Lab Results   Component Value Date    HGBA1C 7.4 09/03/2021     Lab Results   Component Value Date    PSA 0.51 04/03/2021    PSA 0.50 01/14/2020    PSA 0.53 01/10/2019     Uric Acid   Date Value Ref Range Status   01/14/2020 6.1 3.5 - 8.5 mg/dL Final            Assessment and Plan:   Today, we have reviewed his care.  For now, we will make no changes with his medications.  I have refilled those as noted below.  I have encouraged him to check his sugar more regularly and keep a log of those.  Additionally, I have encouraged him to try to be more diligent with diet.  He has lost some weight and his A1c is improving, but I would like to get him to goal if possible.  No other  short-term changes anticipated.  He declined flu and pneumonia vaccines today.  He also is not willing to take the COVID-19 vaccine.       Diagnoses and all orders for this visit:    1. Type 2 diabetes mellitus without complication, without long-term current use of insulin (HCC) (Primary)  -     Jardiance 25 MG tablet tablet; Take 1 tablet by mouth Every Morning.  Dispense: 90 tablet; Refill: 1  -     metFORMIN ER (GLUCOPHAGE-XR) 500 MG 24 hr tablet; Take 2 tablets by mouth Daily.  Dispense: 180 tablet; Refill: 1  -     glucose blood (Accu-Chek Guide) test strip; Check blood sugar once daily for E 11.9  Dispense: 100 each; Refill: 3  -     Accu-Chek FastClix Lancets misc; Check blood sugar once daily for E 11.9  Dispense: 100 each; Refill: 3    2. Essential (primary) hypertension  -     losartan (COZAAR) 100 MG tablet; Take 1 tablet by mouth Daily.  Dispense: 90 tablet; Refill: 1    3. Idiopathic chronic gout of left foot without tophus  -     allopurinol (ZYLOPRIM) 100 MG tablet; Take 2 tablets by mouth Daily.  Dispense: 180 tablet; Refill: 1    4. Mixed hyperlipidemia  -     atorvastatin (LIPITOR) 20 MG tablet; Take 1 tablet by mouth Daily.  Dispense: 90 tablet; Refill: 1    5. Urinary frequency  -     Urinalysis With Microscopic - Urine, Clean Catch          Follow Up   Return in about 6 months (around 4/5/2022).  Patient was given instructions and counseling regarding his condition or for health maintenance advice. Please see specific information pulled into the AVS if appropriate.

## 2021-10-06 LAB
BACTERIA UR QL AUTO: NORMAL /HPF
BILIRUB UR QL STRIP: NEGATIVE
CLARITY UR: CLEAR
COLOR UR: YELLOW
GLUCOSE UR STRIP-MCNC: ABNORMAL MG/DL
HGB UR QL STRIP.AUTO: NEGATIVE
HYALINE CASTS UR QL AUTO: NORMAL /LPF
KETONES UR QL STRIP: NEGATIVE
LEUKOCYTE ESTERASE UR QL STRIP.AUTO: NEGATIVE
NITRITE UR QL STRIP: NEGATIVE
PH UR STRIP.AUTO: 5.5 [PH] (ref 5–8)
PROT UR QL STRIP: NEGATIVE
RBC # UR: NORMAL /HPF
REF LAB TEST METHOD: NORMAL
SP GR UR STRIP: >=1.03 (ref 1–1.03)
SQUAMOUS #/AREA URNS HPF: NORMAL /HPF
UROBILINOGEN UR QL STRIP: ABNORMAL
WBC UR QL AUTO: NORMAL /HPF

## 2021-11-15 ENCOUNTER — TELEPHONE (OUTPATIENT)
Dept: FAMILY MEDICINE CLINIC | Age: 56
End: 2021-11-15

## 2021-11-15 NOTE — TELEPHONE ENCOUNTER
Caller: Man Gill    Relationship: Self    Best call back number: [phone] ANYTIME UNDER QUARANTINE    What medication are you requesting: PREDNISONE 20MG    What are your current symptoms:COVID    How long have you been experiencing symptoms: Friday 11/12/2021    Have you had these symptoms before:    [] Yes  [x] No    Have you been treated for these symptoms before:   [] Yes  [x] No    If a prescription is needed, what is your preferred pharmacy and phone number:  [facility] - [place], [place] - [address] [facility] - [phone] [facility] [phone]   [phone]    Additional notes: PATIENT HAS BEEN DIAGNOSED WITH COVID AND IS REQUESTING PREDNISONE TO RELIEVE SYMPTOMS.

## 2021-11-15 NOTE — TELEPHONE ENCOUNTER
Please arrange telehealth or in person visit to assess.  We would not typically give prednisone for this.  Thanks.

## 2021-11-16 ENCOUNTER — OFFICE VISIT (OUTPATIENT)
Dept: FAMILY MEDICINE CLINIC | Age: 56
End: 2021-11-16

## 2021-11-16 VITALS
HEIGHT: 70 IN | HEART RATE: 98 BPM | SYSTOLIC BLOOD PRESSURE: 120 MMHG | DIASTOLIC BLOOD PRESSURE: 63 MMHG | TEMPERATURE: 98 F | BODY MASS INDEX: 26.14 KG/M2 | WEIGHT: 182.6 LBS

## 2021-11-16 DIAGNOSIS — R05.9 COUGH: ICD-10-CM

## 2021-11-16 DIAGNOSIS — U07.1 COVID-19: Primary | ICD-10-CM

## 2021-11-16 PROCEDURE — 99213 OFFICE O/P EST LOW 20 MIN: CPT | Performed by: FAMILY MEDICINE

## 2021-11-16 RX ORDER — ALBUTEROL SULFATE 90 UG/1
2 AEROSOL, METERED RESPIRATORY (INHALATION) EVERY 6 HOURS PRN
Qty: 18 G | Refills: 1 | Status: SHIPPED | OUTPATIENT
Start: 2021-11-16 | End: 2022-11-11 | Stop reason: SDUPTHER

## 2021-11-16 RX ORDER — PREDNISONE 20 MG/1
TABLET ORAL
Qty: 10 TABLET | Refills: 0 | Status: SHIPPED | OUTPATIENT
Start: 2021-11-16 | End: 2022-05-17 | Stop reason: SDUPTHER

## 2021-11-16 NOTE — PROGRESS NOTES
Man Gill presents to [facility].    Chief Complaint:  Follow up on COVID-19, cough    Subjective       History of Present Illness:  Gera is in today for evaluation of COVID-19.  He first developed symptoms from this about 8 days ago.  He is unsure where he contracted the illness.  He has had mild symptoms thus far.  He has not previously had COVID-19 vaccination.  He called yesterday asking about a steroid pack, and we advised that he come in for evaluation.  He is seeing improvement in how he feels.  He continues to struggle mostly with cough.  He says the cough is productive only of scant sputum.  He denies significant shortness of breath.  Risk factors for severe COVID-19 include type 2 diabetes.      Review of Systems:  Review of Systems   Constitutional: Negative for chills and fever.   Respiratory: Positive for cough. Negative for shortness of breath.    Cardiovascular: Negative for chest pain and palpitations.   Gastrointestinal: Negative for abdominal pain, nausea and vomiting.        Objective   Medical History:  No past medical history on file.  No past surgical history on file.   History reviewed. No pertinent family history.  Social History     Tobacco Use   • Smoking status: Former Smoker     Types: Cigarettes   • Smokeless tobacco: Never Used   Substance Use Topics   • Alcohol use: Not on file       Health Maintenance Due   Topic Date Due   • ANNUAL PHYSICAL  Never done   • Hepatitis B (1 of 3 - Risk 3-dose series) Never done   • ZOSTER VACCINE (1 of 2) Never done   • HEPATITIS C SCREENING  Never done   • DIABETIC EYE EXAM  Never done        Immunization History   Administered Date(s) Administered   • Td 07/09/2004   • Tdap 08/10/2012       No Known Allergies     Medications:  Current Outpatient Medications on File Prior to Visit   Medication Sig   • Accu-Chek FastClix Lancets misc Check blood sugar once daily for E 11.9   • allopurinol (ZYLOPRIM) 100 MG tablet  "Take 2 tablets by mouth Daily.   • Aspirin Buf,CaCarb-MgCarb-MgO, 81 MG tablet Take 81 mg by mouth 3 (Three) Times a Week.   • atorvastatin (LIPITOR) 20 MG tablet Take 1 tablet by mouth Daily.   • glucose blood (Accu-Chek Guide) test strip Check blood sugar once daily for E 11.9   • Jardiance 25 MG tablet tablet Take 1 tablet by mouth Every Morning.   • losartan (COZAAR) 100 MG tablet Take 1 tablet by mouth Daily.   • metFORMIN ER (GLUCOPHAGE-XR) 500 MG 24 hr tablet Take 2 tablets by mouth Daily.     No current facility-administered medications on file prior to visit.       Vital Signs:   /63 (BP Location: Right arm, Patient Position: Sitting)   Pulse 98   Temp 98 °F (36.7 °C) (Oral)   Ht 177.8 cm (70\")   Wt 82.8 kg (182 lb 9.6 oz)   BMI 26.20 kg/m²       Physical Exam:  Physical Exam  Vitals and nursing note reviewed.   Constitutional:       General: He is not in acute distress.     Appearance: He is not ill-appearing.   HENT:      Right Ear: Tympanic membrane and ear canal normal.      Left Ear: Tympanic membrane and ear canal normal.      Mouth/Throat:      Mouth: Mucous membranes are moist.      Comments: Pharynx appears normal  Eyes:      Extraocular Movements: Extraocular movements intact.      Pupils: Pupils are equal, round, and reactive to light.   Neck:      Thyroid: No thyromegaly.      Comments: No thyromegaly  Cardiovascular:      Rate and Rhythm: Normal rate and regular rhythm.      Heart sounds: No murmur heard.      Pulmonary:      Effort: Pulmonary effort is normal.      Breath sounds: Normal breath sounds.   Abdominal:      General: There is no distension.      Palpations: Abdomen is soft. There is no mass.      Tenderness: There is no abdominal tenderness.   Musculoskeletal:      Cervical back: Normal range of motion and neck supple.   Lymphadenopathy:      Cervical: No cervical adenopathy.   Skin:     Findings: No lesion or rash.   Neurological:      General: No focal deficit present. "      Mental Status: He is alert and oriented to person, place, and time.      Cranial Nerves: No cranial nerve deficit.   Psychiatric:         Mood and Affect: Mood normal.         Result Review      The following data was reviewed by Vadim Mishra MD on 11/16/2021.  No results found for: WBC, HGB, HCT, MCV, PLT  Lab Results   Component Value Date    GLUCOSE 190 (H) 04/03/2021    BUN 15 04/03/2021    CREATININE 1.06 04/03/2021     04/03/2021    K 4.9 04/03/2021     04/03/2021    CO2 21 (L) 04/03/2021    CALCIUM 9.1 04/03/2021    PROTEINTOT 7.1 04/03/2021    ALBUMIN 4.3 04/03/2021    ALT 42 (H) 04/03/2021    AST 31 04/03/2021    ALKPHOS 65 04/03/2021    BILITOT 0.56 04/03/2021    GLOB 2.8 04/03/2021    BCR 14 04/03/2021    ANIONGAP 19 04/03/2021      Lab Results   Component Value Date    CHLPL 151 04/03/2021    TRIG 98 04/03/2021    HDL 37 (L) 04/03/2021    LDL 94 04/03/2021     Lab Results   Component Value Date    TSH 1.560 04/03/2021     Lab Results   Component Value Date    HGBA1C 7.4 09/03/2021     Lab Results   Component Value Date    PSA 0.51 04/03/2021    PSA 0.50 01/14/2020    PSA 0.53 01/10/2019            Assessment and Plan:   Today, we have again reviewed his care.  He is having some bronchospasm related to COVID-19.  We will go ahead and cover him with 5 days of prednisone and some albuterol for as needed use.  He is already taking some NyQuil to help at night.  We discussed moving ahead with monoclonal antibody treatment, but he does not want to do that.  He says he is already about 50% better.  He will return if he does not see ongoing improvement.  He is maintaining his activity level and it does not have a obviously worrisome findings at this time.       Diagnoses and all orders for this visit:    1. COVID-19 (Primary)  -     predniSONE (DELTASONE) 20 MG tablet; Take 2 tablets by oral route once daily  Dispense: 10 tablet; Refill: 0  -     albuterol sulfate  (90 Base)  MCG/ACT inhaler; Inhale 2 puffs Every 6 (Six) Hours As Needed for Wheezing.  Dispense: 18 g; Refill: 1    2. Cough  -     predniSONE (DELTASONE) 20 MG tablet; Take 2 tablets by oral route once daily  Dispense: 10 tablet; Refill: 0  -     albuterol sulfate  (90 Base) MCG/ACT inhaler; Inhale 2 puffs Every 6 (Six) Hours As Needed for Wheezing.  Dispense: 18 g; Refill: 1          Follow Up   Return if symptoms worsen or fail to improve.  Patient was given instructions and counseling regarding his condition or for health maintenance advice. Please see specific information pulled into the AVS if appropriate.

## 2021-11-16 NOTE — PATIENT INSTRUCTIONS
10 Things You Can Do to Manage Your COVID-19 Symptoms at Home  If you have possible or confirmed COVID-19:  1. Stay home except to get medical care.  2. Monitor your symptoms carefully. If your symptoms get worse, call your healthcare provider immediately.  3. Get rest and stay hydrated.  4. If you have a medical appointment, call the healthcare provider ahead of time and tell them that you have or may have COVID-19.  5. For medical emergencies, call 911 and notify the dispatch personnel that you have or may have COVID-19.  6. Cover your cough and sneezes with a tissue or use the inside of your elbow.  7. Wash your hands often with soap and water for at least 20 seconds or clean your hands with an alcohol-based hand  that contains at least 60% alcohol.  8. As much as possible, stay in a specific room and away from other people in your home. Also, you should use a separate bathroom, if available. If you need to be around other people in or outside of the home, wear a mask.  9. Avoid sharing personal items with other people in your household, like dishes, towels, and bedding.  10. Clean all surfaces that are touched often, like counters, tabletops, and doorknobs. Use household cleaning sprays or wipes according to the label instructions.  cdc.gov/coronavirus  07/16/2021  This information is not intended to replace advice given to you by your health care provider. Make sure you discuss any questions you have with your health care provider.  Document Revised: 08/04/2021 Document Reviewed: 08/04/2021  Elsevier Patient Education © 2021 [employer]

## 2021-11-29 DIAGNOSIS — U07.1 COVID-19: ICD-10-CM

## 2021-11-29 DIAGNOSIS — R05.9 COUGH: ICD-10-CM

## 2021-11-29 RX ORDER — PREDNISONE 20 MG/1
TABLET ORAL
Qty: 10 TABLET | Refills: 0 | OUTPATIENT
Start: 2021-11-29

## 2021-12-01 ENCOUNTER — APPOINTMENT (OUTPATIENT)
Dept: PULMONOLOGY | Age: 56
End: 2021-12-01

## 2021-12-01 DIAGNOSIS — R05.9 COUGH: ICD-10-CM

## 2021-12-01 DIAGNOSIS — U07.1 COVID-19: ICD-10-CM

## 2021-12-01 RX ORDER — PREDNISONE 20 MG/1
TABLET ORAL
Qty: 10 TABLET | Refills: 0 | OUTPATIENT
Start: 2021-12-01

## 2021-12-03 ENCOUNTER — OFFICE VISIT (OUTPATIENT)
Dept: CARDIOLOGY | Age: 56
End: 2021-12-03

## 2021-12-03 VITALS
SYSTOLIC BLOOD PRESSURE: 138 MMHG | WEIGHT: 190 LBS | BODY MASS INDEX: 30.53 KG/M2 | HEIGHT: 66 IN | HEART RATE: 79 BPM | DIASTOLIC BLOOD PRESSURE: 86 MMHG

## 2021-12-03 DIAGNOSIS — I49.5 SINOATRIAL NODE DYSFUNCTION (CMD): Primary | ICD-10-CM

## 2021-12-03 DIAGNOSIS — Z95.0 PACEMAKER: ICD-10-CM

## 2021-12-03 PROCEDURE — 93280 PM DEVICE PROGR EVAL DUAL: CPT | Performed by: INTERNAL MEDICINE

## 2021-12-04 DIAGNOSIS — I10 ESSENTIAL (PRIMARY) HYPERTENSION: ICD-10-CM

## 2021-12-04 DIAGNOSIS — E78.2 MIXED HYPERLIPIDEMIA: ICD-10-CM

## 2021-12-04 DIAGNOSIS — Z11.59 NEED FOR HEPATITIS C SCREENING TEST: ICD-10-CM

## 2021-12-04 DIAGNOSIS — M1A.0720 IDIOPATHIC CHRONIC GOUT OF LEFT FOOT WITHOUT TOPHUS: ICD-10-CM

## 2021-12-04 DIAGNOSIS — E11.9 TYPE 2 DIABETES MELLITUS WITHOUT COMPLICATION, WITHOUT LONG-TERM CURRENT USE OF INSULIN (HCC): Primary | ICD-10-CM

## 2021-12-04 NOTE — PROGRESS NOTES
Please let him know that I have reviewed his chart further.  I would recommend moving ahead with follow-up blood work at this time.  These labs do not need to be fasting.  Thanks.

## 2022-01-14 DIAGNOSIS — E11.9 TYPE 2 DIABETES MELLITUS WITHOUT COMPLICATION, WITHOUT LONG-TERM CURRENT USE OF INSULIN: ICD-10-CM

## 2022-01-14 DIAGNOSIS — I10 ESSENTIAL (PRIMARY) HYPERTENSION: ICD-10-CM

## 2022-01-14 RX ORDER — METFORMIN HYDROCHLORIDE 500 MG/1
1000 TABLET, EXTENDED RELEASE ORAL DAILY
Qty: 180 TABLET | Refills: 0 | Status: SHIPPED | OUTPATIENT
Start: 2022-01-14 | End: 2022-04-20

## 2022-01-14 RX ORDER — LOSARTAN POTASSIUM 100 MG/1
100 TABLET ORAL DAILY
Qty: 90 TABLET | Refills: 0 | Status: SHIPPED | OUTPATIENT
Start: 2022-01-14 | End: 2022-04-18

## 2022-01-14 NOTE — TELEPHONE ENCOUNTER
I did authorize refills for 90 days.  He does have pending lab orders that I would recommend he complete.  Thanks.

## 2022-01-17 NOTE — TELEPHONE ENCOUNTER
States that his new insurance does not cover Jardiance, was going to be over $500 for 30 day supply.

## 2022-02-14 DIAGNOSIS — F51.01 PRIMARY INSOMNIA: ICD-10-CM

## 2022-02-14 RX ORDER — ZOLPIDEM TARTRATE 10 MG/1
TABLET ORAL
Qty: 90 TABLET | OUTPATIENT
Start: 2022-02-14

## 2022-02-14 RX ORDER — PRAVASTATIN SODIUM 40 MG
TABLET ORAL
Qty: 90 TABLET | Refills: 0 | Status: SHIPPED | OUTPATIENT
Start: 2022-02-14 | End: 2022-06-13

## 2022-03-11 ENCOUNTER — REMOTE DEVICE CHECK (OUTPATIENT)
Dept: ELECTROPHYSIOLOGY | Age: 57
End: 2022-03-11

## 2022-03-11 DIAGNOSIS — I49.5 SINOATRIAL NODE DYSFUNCTION (CMD): Primary | ICD-10-CM

## 2022-03-11 DIAGNOSIS — Z95.0 PACEMAKER: ICD-10-CM

## 2022-03-11 PROCEDURE — 93294 REM INTERROG EVL PM/LDLS PM: CPT | Performed by: INTERNAL MEDICINE

## 2022-03-11 PROCEDURE — 93296 REM INTERROG EVL PM/IDS: CPT | Performed by: INTERNAL MEDICINE

## 2022-04-12 ENCOUNTER — OFFICE VISIT (OUTPATIENT)
Dept: FAMILY MEDICINE | Age: 57
End: 2022-04-12

## 2022-04-12 ENCOUNTER — IMAGING SERVICES (OUTPATIENT)
Dept: GENERAL RADIOLOGY | Age: 57
End: 2022-04-12
Attending: PHYSICIAN ASSISTANT

## 2022-04-12 VITALS
HEIGHT: 66 IN | TEMPERATURE: 96.4 F | WEIGHT: 198.6 LBS | RESPIRATION RATE: 16 BRPM | SYSTOLIC BLOOD PRESSURE: 116 MMHG | OXYGEN SATURATION: 97 % | HEART RATE: 112 BPM | BODY MASS INDEX: 31.92 KG/M2 | DIASTOLIC BLOOD PRESSURE: 78 MMHG

## 2022-04-12 DIAGNOSIS — M54.50 BACK PAIN OF THORACOLUMBAR REGION: ICD-10-CM

## 2022-04-12 DIAGNOSIS — I47.10 SVT (SUPRAVENTRICULAR TACHYCARDIA): ICD-10-CM

## 2022-04-12 DIAGNOSIS — M54.2 CHRONIC NECK PAIN: ICD-10-CM

## 2022-04-12 DIAGNOSIS — E78.2 MIXED HYPERLIPIDEMIA: ICD-10-CM

## 2022-04-12 DIAGNOSIS — M54.6 BACK PAIN OF THORACOLUMBAR REGION: ICD-10-CM

## 2022-04-12 DIAGNOSIS — M54.2 CHRONIC NECK PAIN: Primary | ICD-10-CM

## 2022-04-12 DIAGNOSIS — R51.9 HEADACHE DISORDER: ICD-10-CM

## 2022-04-12 DIAGNOSIS — F51.01 PRIMARY INSOMNIA: ICD-10-CM

## 2022-04-12 DIAGNOSIS — G47.31 COMPLEX SLEEP APNEA SYNDROME: ICD-10-CM

## 2022-04-12 DIAGNOSIS — G89.29 CHRONIC NECK PAIN: Primary | ICD-10-CM

## 2022-04-12 DIAGNOSIS — G89.29 CHRONIC NECK PAIN: ICD-10-CM

## 2022-04-12 PROCEDURE — 72050 X-RAY EXAM NECK SPINE 4/5VWS: CPT | Performed by: RADIOLOGY

## 2022-04-12 PROCEDURE — 99213 OFFICE O/P EST LOW 20 MIN: CPT | Performed by: PHYSICIAN ASSISTANT

## 2022-04-12 RX ORDER — FLUTICASONE PROPIONATE 50 MCG
2 SPRAY, SUSPENSION (ML) NASAL DAILY
Qty: 48 ML | Refills: 11 | Status: SHIPPED | OUTPATIENT
Start: 2022-04-12 | End: 2023-08-08

## 2022-04-12 RX ORDER — ZOLPIDEM TARTRATE 10 MG/1
10 TABLET ORAL NIGHTLY
Qty: 90 TABLET | Refills: 1 | Status: SHIPPED | OUTPATIENT
Start: 2022-04-12 | End: 2022-11-16 | Stop reason: SDUPTHER

## 2022-04-12 RX ORDER — CYCLOBENZAPRINE HCL 10 MG
10 TABLET ORAL 3 TIMES DAILY PRN
Qty: 30 TABLET | Refills: 3 | Status: SHIPPED | OUTPATIENT
Start: 2022-04-12 | End: 2022-11-16 | Stop reason: SDUPTHER

## 2022-04-12 RX ORDER — TADALAFIL 20 MG/1
20 TABLET ORAL PRN
Qty: 30 TABLET | Refills: 11 | Status: SHIPPED | OUTPATIENT
Start: 2022-04-12 | End: 2022-05-02 | Stop reason: SDUPTHER

## 2022-04-12 RX ORDER — SULINDAC 200 MG/1
200 TABLET ORAL 2 TIMES DAILY
Qty: 60 TABLET | Refills: 1 | Status: SHIPPED | OUTPATIENT
Start: 2022-04-12 | End: 2022-05-27 | Stop reason: SDUPTHER

## 2022-04-12 ASSESSMENT — ENCOUNTER SYMPTOMS
FATIGUE: 0
BACK PAIN: 1
DIAPHORESIS: 1
DIARRHEA: 0
SHORTNESS OF BREATH: 0
SINUS PAIN: 0
SINUS PRESSURE: 0
NAUSEA: 0
WHEEZING: 0
FEVER: 0
COUGH: 0
DIZZINESS: 1
HEADACHES: 1
CHILLS: 0
VOMITING: 0

## 2022-04-12 ASSESSMENT — PATIENT HEALTH QUESTIONNAIRE - PHQ9
2. FEELING DOWN, DEPRESSED OR HOPELESS: NOT AT ALL
CLINICAL INTERPRETATION OF PHQ2 SCORE: NO FURTHER SCREENING NEEDED
SUM OF ALL RESPONSES TO PHQ9 QUESTIONS 1 AND 2: 0
SUM OF ALL RESPONSES TO PHQ9 QUESTIONS 1 AND 2: 0
1. LITTLE INTEREST OR PLEASURE IN DOING THINGS: NOT AT ALL

## 2022-04-15 ENCOUNTER — TELEPHONE (OUTPATIENT)
Dept: FAMILY MEDICINE | Age: 57
End: 2022-04-15

## 2022-04-16 DIAGNOSIS — I10 ESSENTIAL (PRIMARY) HYPERTENSION: ICD-10-CM

## 2022-04-18 RX ORDER — LOSARTAN POTASSIUM 100 MG/1
100 TABLET ORAL DAILY
Qty: 90 TABLET | Refills: 0 | Status: SHIPPED | OUTPATIENT
Start: 2022-04-18 | End: 2022-05-17 | Stop reason: SDUPTHER

## 2022-04-18 NOTE — TELEPHONE ENCOUNTER
I did authorize a single refill.  He will be due for follow-up in mid May though.  Please schedule.  Thanks.

## 2022-04-19 DIAGNOSIS — E11.9 TYPE 2 DIABETES MELLITUS WITHOUT COMPLICATION, WITHOUT LONG-TERM CURRENT USE OF INSULIN: ICD-10-CM

## 2022-04-19 DIAGNOSIS — E78.2 MIXED HYPERLIPIDEMIA: ICD-10-CM

## 2022-04-20 RX ORDER — ATORVASTATIN CALCIUM 20 MG/1
20 TABLET, FILM COATED ORAL DAILY
Qty: 90 TABLET | Refills: 0 | Status: SHIPPED | OUTPATIENT
Start: 2022-04-20 | End: 2022-05-17 | Stop reason: SDUPTHER

## 2022-04-20 RX ORDER — METFORMIN HYDROCHLORIDE 500 MG/1
1000 TABLET, EXTENDED RELEASE ORAL
Qty: 180 TABLET | Refills: 0 | Status: SHIPPED | OUTPATIENT
Start: 2022-04-20 | End: 2022-05-17 | Stop reason: SDUPTHER

## 2022-04-26 ENCOUNTER — TELEPHONE (OUTPATIENT)
Dept: FAMILY MEDICINE | Age: 57
End: 2022-04-26

## 2022-05-02 ENCOUNTER — TELEPHONE (OUTPATIENT)
Dept: FAMILY MEDICINE | Age: 57
End: 2022-05-02

## 2022-05-02 RX ORDER — TADALAFIL 20 MG/1
TABLET ORAL
Qty: 30 TABLET | Refills: 11 | Status: SHIPPED | OUTPATIENT
Start: 2022-05-02 | End: 2023-04-17 | Stop reason: SDUPTHER

## 2022-05-05 ENCOUNTER — APPOINTMENT (OUTPATIENT)
Dept: FAMILY MEDICINE | Age: 57
End: 2022-05-05

## 2022-05-14 DIAGNOSIS — B86 SCABIES: Primary | ICD-10-CM

## 2022-05-14 RX ORDER — PERMETHRIN 50 MG/G
1 CREAM TOPICAL ONCE
Qty: 1 G | Refills: 0 | Status: SHIPPED | OUTPATIENT
Start: 2022-05-14 | End: 2022-05-14

## 2022-05-14 NOTE — PROGRESS NOTES
The patient's wife called and asked that we send a prescription for presumed scabies treatment.  I have sent permethrin to [facility] this morning.  Thanks.

## 2022-05-17 ENCOUNTER — OFFICE VISIT (OUTPATIENT)
Dept: FAMILY MEDICINE CLINIC | Age: 57
End: 2022-05-17

## 2022-05-17 ENCOUNTER — LAB (OUTPATIENT)
Dept: LAB | Facility: HOSPITAL | Age: 57
End: 2022-05-17

## 2022-05-17 VITALS
DIASTOLIC BLOOD PRESSURE: 102 MMHG | WEIGHT: 195.2 LBS | HEIGHT: 70 IN | SYSTOLIC BLOOD PRESSURE: 143 MMHG | HEART RATE: 93 BPM | BODY MASS INDEX: 27.94 KG/M2 | TEMPERATURE: 98.1 F

## 2022-05-17 DIAGNOSIS — Z11.59 NEED FOR HEPATITIS C SCREENING TEST: ICD-10-CM

## 2022-05-17 DIAGNOSIS — E78.2 MIXED HYPERLIPIDEMIA: ICD-10-CM

## 2022-05-17 DIAGNOSIS — Z12.5 SCREENING FOR PROSTATE CANCER: ICD-10-CM

## 2022-05-17 DIAGNOSIS — Z79.899 OTHER LONG TERM (CURRENT) DRUG THERAPY: ICD-10-CM

## 2022-05-17 DIAGNOSIS — E11.9 TYPE 2 DIABETES MELLITUS WITHOUT COMPLICATION, WITHOUT LONG-TERM CURRENT USE OF INSULIN: Primary | ICD-10-CM

## 2022-05-17 DIAGNOSIS — M1A.0720 IDIOPATHIC CHRONIC GOUT OF LEFT FOOT WITHOUT TOPHUS: ICD-10-CM

## 2022-05-17 DIAGNOSIS — I10 ESSENTIAL (PRIMARY) HYPERTENSION: ICD-10-CM

## 2022-05-17 DIAGNOSIS — E11.9 TYPE 2 DIABETES MELLITUS WITHOUT COMPLICATION, WITHOUT LONG-TERM CURRENT USE OF INSULIN: ICD-10-CM

## 2022-05-17 LAB
ALBUMIN SERPL-MCNC: 4.7 G/DL (ref 3.5–5.2)
ALBUMIN UR-MCNC: <1.2 MG/DL
ALBUMIN/GLOB SERPL: 2.4 G/DL
ALP SERPL-CCNC: 98 U/L (ref 39–117)
ALT SERPL W P-5'-P-CCNC: 49 U/L (ref 1–41)
ANION GAP SERPL CALCULATED.3IONS-SCNC: 12.4 MMOL/L (ref 5–15)
AST SERPL-CCNC: 31 U/L (ref 1–40)
BILIRUB SERPL-MCNC: 0.5 MG/DL (ref 0–1.2)
BUN SERPL-MCNC: 10 MG/DL (ref 6–20)
BUN/CREAT SERPL: 11 (ref 7–25)
CALCIUM SPEC-SCNC: 9.5 MG/DL (ref 8.6–10.5)
CHLORIDE SERPL-SCNC: 99 MMOL/L (ref 98–107)
CHOLEST SERPL-MCNC: 140 MG/DL (ref 0–200)
CO2 SERPL-SCNC: 25.6 MMOL/L (ref 22–29)
CREAT SERPL-MCNC: 0.91 MG/DL (ref 0.76–1.27)
DEPRECATED RDW RBC AUTO: 42.6 FL (ref 37–54)
EGFRCR SERPLBLD CKD-EPI 2021: 97.7 ML/MIN/1.73
ERYTHROCYTE [DISTWIDTH] IN BLOOD BY AUTOMATED COUNT: 13.4 % (ref 12.3–15.4)
GLOBULIN UR ELPH-MCNC: 2 GM/DL
GLUCOSE SERPL-MCNC: 171 MG/DL (ref 65–99)
HBA1C MFR BLD: 9.4 % (ref 4.8–5.6)
HCT VFR BLD AUTO: 44.3 % (ref 37.5–51)
HCV AB SER DONR QL: NORMAL
HDLC SERPL-MCNC: 38 MG/DL (ref 40–60)
HGB BLD-MCNC: 14.4 G/DL (ref 13–17.7)
LDLC SERPL CALC-MCNC: 75 MG/DL (ref 0–100)
LDLC/HDLC SERPL: 1.85 {RATIO}
MCH RBC QN AUTO: 28.3 PG (ref 26.6–33)
MCHC RBC AUTO-ENTMCNC: 32.5 G/DL (ref 31.5–35.7)
MCV RBC AUTO: 87 FL (ref 79–97)
PLATELET # BLD AUTO: 221 10*3/MM3 (ref 140–450)
PMV BLD AUTO: 11.8 FL (ref 6–12)
POTASSIUM SERPL-SCNC: 4.2 MMOL/L (ref 3.5–5.2)
PROT SERPL-MCNC: 6.7 G/DL (ref 6–8.5)
PSA SERPL-MCNC: 0.44 NG/ML (ref 0–4)
RBC # BLD AUTO: 5.09 10*6/MM3 (ref 4.14–5.8)
SODIUM SERPL-SCNC: 137 MMOL/L (ref 136–145)
TRIGL SERPL-MCNC: 158 MG/DL (ref 0–150)
TSH SERPL DL<=0.05 MIU/L-ACNC: 2.46 UIU/ML (ref 0.27–4.2)
VLDLC SERPL-MCNC: 27 MG/DL (ref 5–40)
WBC NRBC COR # BLD: 7.58 10*3/MM3 (ref 3.4–10.8)

## 2022-05-17 PROCEDURE — 82043 UR ALBUMIN QUANTITATIVE: CPT

## 2022-05-17 PROCEDURE — 99214 OFFICE O/P EST MOD 30 MIN: CPT | Performed by: FAMILY MEDICINE

## 2022-05-17 PROCEDURE — G0103 PSA SCREENING: HCPCS

## 2022-05-17 PROCEDURE — 80050 GENERAL HEALTH PANEL: CPT

## 2022-05-17 PROCEDURE — 86803 HEPATITIS C AB TEST: CPT

## 2022-05-17 PROCEDURE — 83036 HEMOGLOBIN GLYCOSYLATED A1C: CPT

## 2022-05-17 PROCEDURE — 36415 COLL VENOUS BLD VENIPUNCTURE: CPT

## 2022-05-17 PROCEDURE — 80061 LIPID PANEL: CPT

## 2022-05-17 RX ORDER — ATORVASTATIN CALCIUM 20 MG/1
20 TABLET, FILM COATED ORAL DAILY
Qty: 90 TABLET | Refills: 1 | Status: SHIPPED | OUTPATIENT
Start: 2022-05-17 | End: 2022-11-11

## 2022-05-17 RX ORDER — LOSARTAN POTASSIUM 100 MG/1
100 TABLET ORAL DAILY
Qty: 90 TABLET | Refills: 1 | Status: SHIPPED | OUTPATIENT
Start: 2022-05-17 | End: 2022-11-11 | Stop reason: SDUPTHER

## 2022-05-17 RX ORDER — ALLOPURINOL 100 MG/1
200 TABLET ORAL DAILY
Qty: 180 TABLET | Refills: 1 | Status: SHIPPED | OUTPATIENT
Start: 2022-05-17 | End: 2022-11-11 | Stop reason: SDUPTHER

## 2022-05-17 RX ORDER — METFORMIN HYDROCHLORIDE 500 MG/1
1000 TABLET, EXTENDED RELEASE ORAL
Qty: 180 TABLET | Refills: 1 | Status: SHIPPED | OUTPATIENT
Start: 2022-05-17 | End: 2022-11-11 | Stop reason: SDUPTHER

## 2022-05-17 NOTE — PROGRESS NOTES
Man Gill presents to [facility].    Chief Complaint:  Diabetes, blood pressure, cholesterol, prostate    Subjective       History of Present Illness:  Gera is in today for follow-up on his care.  He has type 2 diabetes for which he currently takes metformin.  He was previously taking Jardiance, but he stopped it due to expense.  He has not checked his sugar recently.    He also has hypertension and elevated cholesterol.  He remains on treatment for these problems.  He is due for repeat labs today.      Review of Systems:  Review of Systems   Constitutional: Negative for chills and fever.   Respiratory: Negative for cough and shortness of breath.    Cardiovascular: Negative for chest pain and palpitations.   Gastrointestinal: Negative for abdominal pain, nausea and vomiting.        Objective   Medical History:  Hypertension     Renal Stones: dx'd in 2014;         PREVENTIVE HEALTH MAINTENANCE             COLORECTAL CANCER SCREENING: Up to date (colonoscopy q10y; sigmoidoscopy q5y; Cologuard q3y) was last done 11/2013, Results are in chart; colonoscopy with normal results         Surgical History:     NONE         Family History:     Father: Coronary Artery Disease; Hypertension     Mother: Hypertension     Brother(s): Coronary Artery Disease         Social History:     Occupation: Employed at [employer]     Marital Status:     Children: 1 child     Social History     Tobacco Use   • Smoking status: Former Smoker     Types: Cigarettes   • Smokeless tobacco: Never Used   Substance Use Topics   • Alcohol use: Not on file       Health Maintenance Due   Topic Date Due   • ANNUAL PHYSICAL  Never done   • COVID-19 Vaccine (1) Never done   • Hepatitis B (1 of 3 - Risk 3-dose series) Never done   • ZOSTER VACCINE (1 of 2) Never done   • HEPATITIS C SCREENING  Never done   • DIABETIC EYE EXAM  Never done   • HEMOGLOBIN A1C  03/03/2022   • LIPID PANEL  04/03/2022   • URINE MICROALBUMIN   "04/03/2022        Immunization History   Administered Date(s) Administered   • Td 07/09/2004   • Tdap 08/10/2012       No Known Allergies     Medications:  Current Outpatient Medications on File Prior to Visit   Medication Sig   • Accu-Chek FastClix Lancets misc Check blood sugar once daily for E 11.9   • albuterol sulfate  (90 Base) MCG/ACT inhaler Inhale 2 puffs Every 6 (Six) Hours As Needed for Wheezing.   • Aspirin Buf,CaCarb-MgCarb-MgO, 81 MG tablet Take 81 mg by mouth 3 (Three) Times a Week.   • glucose blood (Accu-Chek Guide) test strip Check blood sugar once daily for E 11.9   • [DISCONTINUED] allopurinol (ZYLOPRIM) 100 MG tablet Take 2 tablets by mouth Daily.   • [DISCONTINUED] atorvastatin (LIPITOR) 20 MG tablet Take 1 tablet by mouth Daily.   • [DISCONTINUED] losartan (COZAAR) 100 MG tablet Take 1 tablet by mouth Daily.   • [DISCONTINUED] metFORMIN ER (GLUCOPHAGE-XR) 500 MG 24 hr tablet Take 2 tablets by mouth Daily With Dinner.   • [DISCONTINUED] Jardiance 25 MG tablet tablet Take 1 tablet by mouth Every Morning.   • [DISCONTINUED] predniSONE (DELTASONE) 20 MG tablet Take 2 tablets by oral route once daily     No current facility-administered medications on file prior to visit.       Vital Signs:   /97 (BP Location: Right arm, Patient Position: Sitting)   Pulse 100   Temp 98.1 °F (36.7 °C) (Oral)   Ht 177.8 cm (70\")   Wt 88.5 kg (195 lb 3.2 oz)   BMI 28.01 kg/m²       Physical Exam:  Physical Exam  Vitals and nursing note reviewed.   Constitutional:       General: He is not in acute distress.     Appearance: He is not ill-appearing.   HENT:      Right Ear: Tympanic membrane and ear canal normal.      Left Ear: Tympanic membrane and ear canal normal.      Mouth/Throat:      Mouth: Mucous membranes are moist.      Comments: Pharynx appears normal  Eyes:      Extraocular Movements: Extraocular movements intact.      Pupils: Pupils are equal, round, and reactive to light.   Neck:      " Thyroid: No thyromegaly.   Cardiovascular:      Rate and Rhythm: Normal rate and regular rhythm.      Pulses:           Dorsalis pedis pulses are 2+ on the right side and 2+ on the left side.      Heart sounds: No murmur heard.  Pulmonary:      Effort: Pulmonary effort is normal.      Breath sounds: Normal breath sounds.   Abdominal:      General: There is no distension.      Palpations: Abdomen is soft. There is no mass.      Tenderness: There is no abdominal tenderness.   Musculoskeletal:      Cervical back: Normal range of motion.   Feet:      Right foot:      Protective Sensation: 3 sites tested. 3 sites sensed.      Skin integrity: Callus (Great toe bilaterally) present.      Left foot:      Protective Sensation: 3 sites tested. 3 sites sensed.      Skin integrity: Callus present.   Skin:     Findings: No lesion or rash.   Neurological:      General: No focal deficit present.      Mental Status: He is oriented to person, place, and time.      Cranial Nerves: No cranial nerve deficit.   Psychiatric:         Mood and Affect: Mood normal.         Result Review      The following data was reviewed by Vadim Mishra MD on 05/17/2022.  No results found for: WBC, HGB, HCT, MCV, PLT  Lab Results   Component Value Date    GLUCOSE 190 (H) 04/03/2021    BUN 15 04/03/2021    CREATININE 1.06 04/03/2021     04/03/2021    K 4.9 04/03/2021     04/03/2021    CO2 21 (L) 04/03/2021    CALCIUM 9.1 04/03/2021    PROTEINTOT 7.1 04/03/2021    ALBUMIN 4.3 04/03/2021    ALT 42 (H) 04/03/2021    AST 31 04/03/2021    ALKPHOS 65 04/03/2021    BILITOT 0.56 04/03/2021    GLOB 2.8 04/03/2021    BCR 14 04/03/2021    ANIONGAP 19 04/03/2021      Lab Results   Component Value Date    CHLPL 151 04/03/2021    TRIG 98 04/03/2021    HDL 37 (L) 04/03/2021    LDL 94 04/03/2021     Lab Results   Component Value Date    TSH 1.560 04/03/2021     Lab Results   Component Value Date    HGBA1C 7.4 09/03/2021     Lab Results   Component  Value Date    PSA 0.51 04/03/2021    PSA 0.50 01/14/2020    PSA 0.53 01/10/2019            Assessment and Plan:   Today, we have reviewed his care.  Gera is doing well overall.  We will refill needed medications and update labs as noted below.  His blood pressure is mildly elevated and will be rechecked before he leaves.  Otherwise, we will plan to see him back in about 6 months.  That depends in part on what his labs show.  If additional medication is needed, he would prefer we try to use generic alternatives given his insurance situation.       Diagnoses and all orders for this visit:    1. Type 2 diabetes mellitus without complication, without long-term current use of insulin (HCC) (Primary)  -     MicroAlbumin, Urine, Random - Urine, Clean Catch; Future  -     Hemoglobin A1c; Future  -     metFORMIN ER (GLUCOPHAGE-XR) 500 MG 24 hr tablet; Take 2 tablets by mouth Daily With Dinner.  Dispense: 180 tablet; Refill: 1    2. Idiopathic chronic gout of left foot without tophus  -     Comprehensive Metabolic Panel; Future  -     allopurinol (ZYLOPRIM) 100 MG tablet; Take 2 tablets by mouth Daily.  Dispense: 180 tablet; Refill: 1    3. Essential (primary) hypertension  -     Comprehensive Metabolic Panel; Future  -     losartan (COZAAR) 100 MG tablet; Take 1 tablet by mouth Daily.  Dispense: 90 tablet; Refill: 1    4. Mixed hyperlipidemia  -     Lipid Panel; Future  -     atorvastatin (LIPITOR) 20 MG tablet; Take 1 tablet by mouth Daily.  Dispense: 90 tablet; Refill: 1    5. Other long term (current) drug therapy  -     CBC (No Diff); Future    6. Screening for prostate cancer  -     PSA Screen; Future    7. Need for hepatitis C screening test  -     Hepatitis C Antibody; Future          Follow Up   Return in about 6 months (around 11/17/2022) for Recheck.  Patient was given instructions and counseling regarding his condition or for health maintenance advice. Please see specific information pulled into the AVS if  appropriate.

## 2022-05-18 RX ORDER — AMLODIPINE BESYLATE 2.5 MG/1
2.5 TABLET ORAL
Qty: 90 TABLET | Refills: 1 | Status: SHIPPED | OUTPATIENT
Start: 2022-05-18 | End: 2022-08-18

## 2022-05-27 ENCOUNTER — OFFICE VISIT (OUTPATIENT)
Dept: FAMILY MEDICINE | Age: 57
End: 2022-05-27

## 2022-05-27 ENCOUNTER — LAB SERVICES (OUTPATIENT)
Dept: LAB | Age: 57
End: 2022-05-27

## 2022-05-27 VITALS
SYSTOLIC BLOOD PRESSURE: 128 MMHG | HEIGHT: 66 IN | TEMPERATURE: 96.7 F | DIASTOLIC BLOOD PRESSURE: 72 MMHG | RESPIRATION RATE: 18 BRPM | HEART RATE: 83 BPM | BODY MASS INDEX: 31.85 KG/M2 | WEIGHT: 198.2 LBS | OXYGEN SATURATION: 99 %

## 2022-05-27 DIAGNOSIS — Z12.5 PROSTATE CANCER SCREENING: ICD-10-CM

## 2022-05-27 DIAGNOSIS — J01.01 ACUTE RECURRENT MAXILLARY SINUSITIS: ICD-10-CM

## 2022-05-27 DIAGNOSIS — I47.10 SVT (SUPRAVENTRICULAR TACHYCARDIA): ICD-10-CM

## 2022-05-27 DIAGNOSIS — G47.31 COMPLEX SLEEP APNEA SYNDROME: ICD-10-CM

## 2022-05-27 DIAGNOSIS — E78.2 MIXED HYPERLIPIDEMIA: ICD-10-CM

## 2022-05-27 DIAGNOSIS — Z00.00 GENERAL MEDICAL EXAMINATION: Primary | ICD-10-CM

## 2022-05-27 LAB
ALBUMIN SERPL-MCNC: 3.8 G/DL (ref 3.6–5.1)
ALBUMIN/GLOB SERPL: 1.3 {RATIO} (ref 1–2.4)
ALP SERPL-CCNC: 100 UNITS/L (ref 45–117)
ALT SERPL-CCNC: 29 UNITS/L
ANION GAP SERPL CALC-SCNC: 9 MMOL/L (ref 7–19)
AST SERPL-CCNC: 24 UNITS/L
BASOPHILS # BLD: 0.1 K/MCL (ref 0–0.3)
BASOPHILS NFR BLD: 1 %
BILIRUB SERPL-MCNC: 1.2 MG/DL (ref 0.2–1)
BUN SERPL-MCNC: 14 MG/DL (ref 6–20)
BUN/CREAT SERPL: 14 (ref 7–25)
CALCIUM SERPL-MCNC: 9.3 MG/DL (ref 8.4–10.2)
CHLORIDE SERPL-SCNC: 105 MMOL/L (ref 97–110)
CHOLEST SERPL-MCNC: 158 MG/DL
CHOLEST/HDLC SERPL: 3.8 {RATIO}
CO2 SERPL-SCNC: 28 MMOL/L (ref 21–32)
CREAT SERPL-MCNC: 1.03 MG/DL (ref 0.67–1.17)
DEPRECATED RDW RBC: 42 FL (ref 39–50)
EOSINOPHIL # BLD: 0.1 K/MCL (ref 0–0.5)
EOSINOPHIL NFR BLD: 2 %
ERYTHROCYTE [DISTWIDTH] IN BLOOD: 12.7 % (ref 11–15)
FASTING DURATION TIME PATIENT: 12 HOURS (ref 0–999)
FASTING DURATION TIME PATIENT: 12 HOURS (ref 0–999)
GFR SERPLBLD BASED ON 1.73 SQ M-ARVRAT: 84 ML/MIN
GLOBULIN SER-MCNC: 2.9 G/DL (ref 2–4)
GLUCOSE SERPL-MCNC: 97 MG/DL (ref 70–99)
HCT VFR BLD CALC: 49.9 % (ref 39–51)
HDLC SERPL-MCNC: 42 MG/DL
HGB BLD-MCNC: 16.9 G/DL (ref 13–17)
IMM GRANULOCYTES # BLD AUTO: 0 K/MCL (ref 0–0.2)
IMM GRANULOCYTES # BLD: 1 %
LDLC SERPL CALC-MCNC: 97 MG/DL
LYMPHOCYTES # BLD: 0.9 K/MCL (ref 1–4)
LYMPHOCYTES NFR BLD: 20 %
MCH RBC QN AUTO: 30.8 PG (ref 26–34)
MCHC RBC AUTO-ENTMCNC: 33.9 G/DL (ref 32–36.5)
MCV RBC AUTO: 90.9 FL (ref 78–100)
MONOCYTES # BLD: 0.6 K/MCL (ref 0.3–0.9)
MONOCYTES NFR BLD: 12 %
NEUTROPHILS # BLD: 3 K/MCL (ref 1.8–7.7)
NEUTROPHILS NFR BLD: 64 %
NONHDLC SERPL-MCNC: 116 MG/DL
NRBC BLD MANUAL-RTO: 0 /100 WBC
PLATELET # BLD AUTO: 208 K/MCL (ref 140–450)
POTASSIUM SERPL-SCNC: 4.2 MMOL/L (ref 3.4–5.1)
PROT SERPL-MCNC: 6.7 G/DL (ref 6.4–8.2)
PSA SERPL-MCNC: 3.95 NG/ML
RBC # BLD: 5.49 MIL/MCL (ref 4.5–5.9)
SODIUM SERPL-SCNC: 138 MMOL/L (ref 135–145)
TRIGL SERPL-MCNC: 93 MG/DL
WBC # BLD: 4.7 K/MCL (ref 4.2–11)

## 2022-05-27 PROCEDURE — 36415 COLL VENOUS BLD VENIPUNCTURE: CPT | Performed by: INTERNAL MEDICINE

## 2022-05-27 PROCEDURE — 80061 LIPID PANEL: CPT | Performed by: INTERNAL MEDICINE

## 2022-05-27 PROCEDURE — 85025 COMPLETE CBC W/AUTO DIFF WBC: CPT | Performed by: INTERNAL MEDICINE

## 2022-05-27 PROCEDURE — 99396 PREV VISIT EST AGE 40-64: CPT | Performed by: PHYSICIAN ASSISTANT

## 2022-05-27 PROCEDURE — 84153 ASSAY OF PSA TOTAL: CPT | Performed by: INTERNAL MEDICINE

## 2022-05-27 PROCEDURE — 80053 COMPREHEN METABOLIC PANEL: CPT | Performed by: INTERNAL MEDICINE

## 2022-05-27 RX ORDER — AZITHROMYCIN 250 MG/1
TABLET, FILM COATED ORAL
Qty: 6 TABLET | Refills: 0 | Status: SHIPPED | OUTPATIENT
Start: 2022-05-27 | End: 2022-06-01

## 2022-05-27 RX ORDER — SULINDAC 200 MG/1
200 TABLET ORAL 2 TIMES DAILY
Qty: 180 TABLET | Refills: 1 | Status: SHIPPED | OUTPATIENT
Start: 2022-05-27 | End: 2022-06-13

## 2022-05-27 ASSESSMENT — PATIENT HEALTH QUESTIONNAIRE - PHQ9
1. LITTLE INTEREST OR PLEASURE IN DOING THINGS: NOT AT ALL
2. FEELING DOWN, DEPRESSED OR HOPELESS: NOT AT ALL
CLINICAL INTERPRETATION OF PHQ2 SCORE: NO FURTHER SCREENING NEEDED
SUM OF ALL RESPONSES TO PHQ9 QUESTIONS 1 AND 2: 0
SUM OF ALL RESPONSES TO PHQ9 QUESTIONS 1 AND 2: 0

## 2022-05-27 ASSESSMENT — ENCOUNTER SYMPTOMS
DIZZINESS: 0
SHORTNESS OF BREATH: 0
COUGH: 0
WHEEZING: 0
FATIGUE: 0
DIAPHORESIS: 0
NAUSEA: 0
CHILLS: 0
SORE THROAT: 0
VOMITING: 0
RHINORRHEA: 0
BACK PAIN: 1
HEADACHES: 0
SLEEP DISTURBANCE: 0
DIARRHEA: 0
SINUS PAIN: 1
FEVER: 0
SINUS PRESSURE: 1

## 2022-05-31 ENCOUNTER — TELEPHONE (OUTPATIENT)
Dept: FAMILY MEDICINE CLINIC | Age: 57
End: 2022-05-31

## 2022-05-31 DIAGNOSIS — E11.65 TYPE 2 DIABETES MELLITUS WITH HYPERGLYCEMIA, WITHOUT LONG-TERM CURRENT USE OF INSULIN: Primary | ICD-10-CM

## 2022-06-01 RX ORDER — GLIPIZIDE 5 MG/1
5 TABLET ORAL DAILY
Qty: 90 TABLET | Refills: 1 | Status: SHIPPED | OUTPATIENT
Start: 2022-06-01 | End: 2022-06-30

## 2022-06-01 NOTE — TELEPHONE ENCOUNTER
Noted.  I have sent glipizide 5 mg daily for his diabetes.  This will be in addition to metformin.  It would be best to take this once daily in the morning.  I would recommend he continue to check his fasting blood sugars and keep a written log of those.  There is some risk of low blood sugars with any addition of medication.  TICKLE to call him again in about 4 weeks and see how his fasting sugars are running with the addition of this medication.  Let me know if he has other concerns.  Thanks

## 2022-06-06 RX ORDER — PERMETHRIN 50 MG/G
1 CREAM TOPICAL ONCE
Qty: 1 G | Refills: 0 | Status: SHIPPED | OUTPATIENT
Start: 2022-06-06 | End: 2022-06-16

## 2022-06-07 ENCOUNTER — TELEPHONE (OUTPATIENT)
Dept: FAMILY MEDICINE | Age: 57
End: 2022-06-07

## 2022-06-07 DIAGNOSIS — R97.20 ELEVATED PSA: Primary | ICD-10-CM

## 2022-06-13 RX ORDER — SULINDAC 200 MG/1
TABLET ORAL
Qty: 180 TABLET | Refills: 1 | Status: SHIPPED | OUTPATIENT
Start: 2022-06-13 | End: 2023-04-17 | Stop reason: SDUPTHER

## 2022-06-13 RX ORDER — PRAVASTATIN SODIUM 40 MG
TABLET ORAL
Qty: 90 TABLET | Refills: 1 | Status: SHIPPED | OUTPATIENT
Start: 2022-06-13 | End: 2022-11-16 | Stop reason: SDUPTHER

## 2022-06-16 RX ORDER — PERMETHRIN 50 MG/G
CREAM TOPICAL
Qty: 60 G | Refills: 0 | Status: SHIPPED | OUTPATIENT
Start: 2022-06-16 | End: 2022-08-18

## 2022-06-29 ENCOUNTER — TELEPHONE (OUTPATIENT)
Dept: FAMILY MEDICINE CLINIC | Age: 57
End: 2022-06-29

## 2022-06-29 DIAGNOSIS — E11.65 TYPE 2 DIABETES MELLITUS WITH HYPERGLYCEMIA, WITHOUT LONG-TERM CURRENT USE OF INSULIN: ICD-10-CM

## 2022-06-29 NOTE — TELEPHONE ENCOUNTER
Pt states fasting sugars running 131-162 depending on what he eats the night prior. He is aware he needs to watch diet.

## 2022-06-29 NOTE — TELEPHONE ENCOUNTER
----- Message from Jackie Hairston LPN sent at 6/1/2022  1:24 PM EDT -----   TICKLE to call him again in about 4 weeks and see how his fasting sugars are running

## 2022-06-30 RX ORDER — GLIPIZIDE 5 MG/1
5 TABLET ORAL
Qty: 180 TABLET | Refills: 1 | Status: SHIPPED | OUTPATIENT
Start: 2022-06-30 | End: 2022-11-11 | Stop reason: SDUPTHER

## 2022-06-30 NOTE — TELEPHONE ENCOUNTER
Noted.  I would recommend increasing glipizide to bid dosing and have sent updated prescription.  He should be aware there is some risk of low sugars with any change like this, but we are aiming to get the fasting sugar below 120 on a fairly consistent basis.  I would encourage him to continue to be diligent with regard to diet.  Keep August appointment with me.  Let me know if he has additional concerns.  Thanks.

## 2022-07-12 ENCOUNTER — OFFICE VISIT (OUTPATIENT)
Dept: UROLOGY | Age: 57
End: 2022-07-12

## 2022-07-12 VITALS
WEIGHT: 201.6 LBS | RESPIRATION RATE: 14 BRPM | HEIGHT: 66 IN | HEART RATE: 84 BPM | DIASTOLIC BLOOD PRESSURE: 85 MMHG | BODY MASS INDEX: 32.4 KG/M2 | SYSTOLIC BLOOD PRESSURE: 138 MMHG

## 2022-07-12 DIAGNOSIS — R97.20 ELEVATED PSA: Primary | ICD-10-CM

## 2022-07-12 PROCEDURE — 99204 OFFICE O/P NEW MOD 45 MIN: CPT | Performed by: UROLOGY

## 2022-07-29 ENCOUNTER — LAB SERVICES (OUTPATIENT)
Dept: LAB | Age: 57
End: 2022-07-29

## 2022-07-29 DIAGNOSIS — R97.20 ELEVATED PSA: ICD-10-CM

## 2022-07-29 LAB — PSA SERPL-MCNC: 3.45 NG/ML

## 2022-07-29 PROCEDURE — 84153 ASSAY OF PSA TOTAL: CPT | Performed by: INTERNAL MEDICINE

## 2022-07-29 PROCEDURE — 36415 COLL VENOUS BLD VENIPUNCTURE: CPT | Performed by: INTERNAL MEDICINE

## 2022-08-02 ENCOUNTER — EXTERNAL RECORD (OUTPATIENT)
Dept: OTHER | Age: 57
End: 2022-08-02

## 2022-08-04 ENCOUNTER — APPOINTMENT (OUTPATIENT)
Dept: UROLOGY | Age: 57
End: 2022-08-04

## 2022-08-18 ENCOUNTER — LAB (OUTPATIENT)
Dept: LAB | Facility: HOSPITAL | Age: 57
End: 2022-08-18

## 2022-08-18 ENCOUNTER — OFFICE VISIT (OUTPATIENT)
Dept: FAMILY MEDICINE CLINIC | Age: 57
End: 2022-08-18

## 2022-08-18 VITALS
WEIGHT: 196.4 LBS | TEMPERATURE: 97.9 F | BODY MASS INDEX: 28.12 KG/M2 | HEIGHT: 70 IN | HEART RATE: 83 BPM | SYSTOLIC BLOOD PRESSURE: 148 MMHG | DIASTOLIC BLOOD PRESSURE: 96 MMHG

## 2022-08-18 DIAGNOSIS — E11.9 TYPE 2 DIABETES MELLITUS WITHOUT COMPLICATION, WITHOUT LONG-TERM CURRENT USE OF INSULIN: ICD-10-CM

## 2022-08-18 DIAGNOSIS — R53.83 FATIGUE, UNSPECIFIED TYPE: ICD-10-CM

## 2022-08-18 DIAGNOSIS — M79.604 PAIN IN BOTH LOWER EXTREMITIES: ICD-10-CM

## 2022-08-18 DIAGNOSIS — M79.605 PAIN IN BOTH LOWER EXTREMITIES: ICD-10-CM

## 2022-08-18 DIAGNOSIS — I10 ESSENTIAL HYPERTENSION: ICD-10-CM

## 2022-08-18 DIAGNOSIS — E11.65 TYPE 2 DIABETES MELLITUS WITH HYPERGLYCEMIA, WITHOUT LONG-TERM CURRENT USE OF INSULIN: Primary | ICD-10-CM

## 2022-08-18 DIAGNOSIS — E11.65 TYPE 2 DIABETES MELLITUS WITH HYPERGLYCEMIA, WITHOUT LONG-TERM CURRENT USE OF INSULIN: ICD-10-CM

## 2022-08-18 LAB
CK SERPL-CCNC: 299 U/L (ref 20–200)
HBA1C MFR BLD: 8 % (ref 4.8–5.6)

## 2022-08-18 PROCEDURE — 86618 LYME DISEASE ANTIBODY: CPT

## 2022-08-18 PROCEDURE — 82550 ASSAY OF CK (CPK): CPT

## 2022-08-18 PROCEDURE — 99214 OFFICE O/P EST MOD 30 MIN: CPT | Performed by: FAMILY MEDICINE

## 2022-08-18 PROCEDURE — 83036 HEMOGLOBIN GLYCOSYLATED A1C: CPT

## 2022-08-18 PROCEDURE — 36415 COLL VENOUS BLD VENIPUNCTURE: CPT

## 2022-08-18 RX ORDER — LANCETS
EACH MISCELLANEOUS
Qty: 100 EACH | Refills: 3 | Status: SHIPPED | OUTPATIENT
Start: 2022-08-18

## 2022-08-18 NOTE — PROGRESS NOTES
Man Gill presents to [facility].    Chief Complaint:  Diabetes follow up, hypertension    Subjective       History of Present Illness:  Gera is here for follow-up on his care.  He has type 2 diabetes for which he currently takes glipizide and metformin.  His most recent A1c was significantly elevated.  He states that he checks his sugar on a daily basis.  It typically runs around 120.  He denies any hypoglycemia.  He is due for repeat A1c today.    Gera also has hypertension.  After his most recent visit, we recommended adding amlodipine to his medical regimen.  However, he states that his blood pressure usually runs in a good range when he is at home, 120s over 80s.  Therefore, he did not start the medication and does not intend to.    Review of Systems:  Review of Systems   Constitutional: Negative for chills and fever.   Respiratory: Negative for cough and shortness of breath.    Cardiovascular: Negative for chest pain and palpitations.   Gastrointestinal: Negative for abdominal pain, nausea and vomiting.        Objective   Medical History:  Past Medical History:   • Essential hypertension   • Gout   • Kidney stones   • Mixed hyperlipidemia   • Type 2 diabetes mellitus (HCC)     Past Surgical History:   • COLONOSCOPY    Normal      Family History   Problem Relation Age of Onset   • Hypertension Mother    • Coronary artery disease Father    • Hypertension Father    • Coronary artery disease Brother      Social History     Tobacco Use   • Smoking status: Former Smoker     Types: Cigarettes     Quit date:      Years since quittin.6   • Smokeless tobacco: Never Used   Substance Use Topics   • Alcohol use: Not on file       Health Maintenance Due   Topic Date Due   • ANNUAL PHYSICAL  Never done   • Hepatitis B (1 of 3 - Risk 3-dose series) Never done   • ZOSTER VACCINE (1 of 2) Never done   • DIABETIC EYE EXAM  Never done   • TDAP/TD VACCINES (3 - Td or Tdap) 08/10/2022  "       Immunization History   Administered Date(s) Administered   • Td 07/09/2004   • Tdap 08/10/2012       No Known Allergies     Medications:  Current Outpatient Medications on File Prior to Visit   Medication Sig   • Accu-Chek FastClix Lancets misc Check blood sugar once daily for E 11.9   • albuterol sulfate  (90 Base) MCG/ACT inhaler Inhale 2 puffs Every 6 (Six) Hours As Needed for Wheezing.   • allopurinol (ZYLOPRIM) 100 MG tablet Take 2 tablets by mouth Daily.   • Aspirin Buf,CaCarb-MgCarb-MgO, 81 MG tablet Take 81 mg by mouth 3 (Three) Times a Week.   • atorvastatin (LIPITOR) 20 MG tablet Take 1 tablet by mouth Daily.   • glipizide (GLUCOTROL) 5 MG tablet Take 1 tablet by mouth 2 (Two) Times a Day Before Meals.   • glucose blood (Accu-Chek Guide) test strip Check blood sugar once daily for E 11.9   • losartan (COZAAR) 100 MG tablet Take 1 tablet by mouth Daily.   • metFORMIN ER (GLUCOPHAGE-XR) 500 MG 24 hr tablet Take 2 tablets by mouth Daily With Dinner.   • [DISCONTINUED] amLODIPine (NORVASC) 2.5 MG tablet Take 1 tablet by mouth every night at bedtime.   • [DISCONTINUED] permethrin (ELIMITE) 5 % cream apply from head TO TOES AND LEAVE ON overnight WASH off in THE morning     No current facility-administered medications on file prior to visit.       Vital Signs:   /94 (BP Location: Right arm, Patient Position: Sitting)   Pulse 101   Temp 97.9 °F (36.6 °C) (Oral)   Ht 177.8 cm (70\")   Wt 89.1 kg (196 lb 6.4 oz)   BMI 28.18 kg/m²       Physical Exam:  Physical Exam  Vitals reviewed.   Constitutional:       General: He is not in acute distress.     Appearance: He is not ill-appearing.   Eyes:      Pupils: Pupils are equal, round, and reactive to light.   Neck:      Comments: No thyromegaly  Cardiovascular:      Rate and Rhythm: Normal rate and regular rhythm.   Pulmonary:      Effort: Pulmonary effort is normal.      Breath sounds: Normal breath sounds.   Abdominal:      General: There is no " distension.      Palpations: Abdomen is soft.      Tenderness: There is no abdominal tenderness.   Musculoskeletal:      Cervical back: Neck supple.   Lymphadenopathy:      Cervical: No cervical adenopathy.   Skin:     Findings: No lesion or rash.   Neurological:      Mental Status: He is alert.         Result Review      The following data was reviewed by Vadim Mishra MD on 08/18/2022.  Lab Results   Component Value Date    WBC 7.58 05/17/2022    HGB 14.4 05/17/2022    HCT 44.3 05/17/2022    MCV 87.0 05/17/2022     05/17/2022     Lab Results   Component Value Date    GLUCOSE 171 (H) 05/17/2022    BUN 10 05/17/2022    CREATININE 0.91 05/17/2022     05/17/2022    K 4.2 05/17/2022    CL 99 05/17/2022    CO2 25.6 05/17/2022    CALCIUM 9.5 05/17/2022    PROTEINTOT 6.7 05/17/2022    ALBUMIN 4.70 05/17/2022    ALT 49 (H) 05/17/2022    AST 31 05/17/2022    ALKPHOS 98 05/17/2022    BILITOT 0.5 05/17/2022    GLOB 2.0 05/17/2022    AGRATIO 2.4 05/17/2022    BCR 11.0 05/17/2022    ANIONGAP 12.4 05/17/2022      Lab Results   Component Value Date    CHOL 140 05/17/2022    CHLPL 151 04/03/2021    TRIG 158 (H) 05/17/2022    HDL 38 (L) 05/17/2022    LDL 75 05/17/2022     Lab Results   Component Value Date    TSH 2.460 05/17/2022     Lab Results   Component Value Date    HGBA1C 9.40 (H) 05/17/2022     Lab Results   Component Value Date    PSA 0.444 05/17/2022    PSA 0.51 04/03/2021    PSA 0.50 01/14/2020            Assessment and Plan:   Today, we have reviewed his care.  We will repeat the A1cat this time and consider how to move forward.  No other near term change is anticipated.  His pressure remains moderately elevated.  We will repeat it before he goes.  This visit is felt to be of moderate complexity due to addressing multiple chronic problems and the likely need for additional prescription medication.       Diagnoses and all orders for this visit:    1. Type 2 diabetes mellitus with hyperglycemia,  without long-term current use of insulin (HCC) (Primary)  -     Cancel: POC Glycosylated Hemoglobin (Hb A1C)  -     Hemoglobin A1c; Future    2. Essential hypertension  Comments:  As above.    3. Fatigue, unspecified type  -     Lyme Disease Total Antibody With Reflex to Immunoassay; Future    4. Pain in both lower extremities  -     Lyme Disease Total Antibody With Reflex to Immunoassay; Future  -     CK; Future          Follow Up   Return in about 3 months (around 11/18/2022) for Recheck.  Patient was given instructions and counseling regarding his condition or for health maintenance advice. Please see specific information pulled into the AVS if appropriate.

## 2022-08-20 LAB — B BURGDOR IGG+IGM SER QL IA: NEGATIVE

## 2022-08-22 ENCOUNTER — OFFICE VISIT (OUTPATIENT)
Dept: PULMONOLOGY | Age: 57
End: 2022-08-22

## 2022-08-22 VITALS
RESPIRATION RATE: 14 BRPM | BODY MASS INDEX: 33.09 KG/M2 | SYSTOLIC BLOOD PRESSURE: 120 MMHG | WEIGHT: 198.63 LBS | OXYGEN SATURATION: 98 % | HEIGHT: 65 IN | HEART RATE: 95 BPM | DIASTOLIC BLOOD PRESSURE: 74 MMHG

## 2022-08-22 DIAGNOSIS — G47.31 COMPLEX SLEEP APNEA SYNDROME: Primary | ICD-10-CM

## 2022-08-22 PROCEDURE — 99213 OFFICE O/P EST LOW 20 MIN: CPT | Performed by: INTERNAL MEDICINE

## 2022-08-23 ENCOUNTER — TELEPHONE (OUTPATIENT)
Dept: FAMILY MEDICINE CLINIC | Age: 57
End: 2022-08-23

## 2022-08-23 DIAGNOSIS — E11.9 TYPE 2 DIABETES MELLITUS WITHOUT COMPLICATION, WITHOUT LONG-TERM CURRENT USE OF INSULIN: Primary | ICD-10-CM

## 2022-08-23 NOTE — TELEPHONE ENCOUNTER
----- Message from Vadim Mishra MD sent at 8/23/2022  6:32 AM EDT -----  Noted.  If he is agreeable, see if  could talk with him.  I do think this type of medication would be a good choice given his health history and the need to control diabetes.  Ideally, I would like to place him back on Jardiance 10 mg daily #90 with 3 refills.  Thanks.

## 2022-08-24 ENCOUNTER — PATIENT OUTREACH (OUTPATIENT)
Dept: CASE MANAGEMENT | Facility: CLINIC | Age: 57
End: 2022-08-24

## 2022-08-24 ENCOUNTER — REFERRAL TRIAGE (OUTPATIENT)
Dept: CASE MANAGEMENT | Facility: CLINIC | Age: 57
End: 2022-08-24

## 2022-08-24 NOTE — OUTREACH NOTE
Social Work Assessment  Questions/Answers    Flowsheet Row Most Recent Value   Referral Source outpatient staff, outpatient clinic, physician   Reason for Consult medication concerns   Preferred Language English   Application for Public Assistance obtained public assistance pending number        SDOH updated and reviewed with the patient during this program:  Financial Resource Strain: Low Risk    • Difficulty of Paying Living Expenses: Not very hard      Food Insecurity: No Food Insecurity   • Worried About Running Out of Food in the Last Year: Never true   • Ran Out of Food in the Last Year: Never true      Transportation Needs: No Transportation Needs   • Lack of Transportation (Medical): No   • Lack of Transportation (Non-Medical): No      Housing Stability: Low Risk    • Unable to Pay for Housing in the Last Year: No   • Number of Places Lived in the Last Year: 1   • Unstable Housing in the Last Year: No      Continuing Care   Community & DME   [facility] [facility]    [address] [address]    Phone: [phone]    Resource for: Financial Resource Strain     Patient Outreach      SW received referral via PCP office re: medication cost concerns. SW called and spoke to patient via telephone re: medication concerns.Unfortantly, patient is not a candidate for medication assistance program due to commercial insurance plan, however, patient was agreeable to this SW to email prescription drug savings card that he can present at the pharmacy to receive needed medication at a discounted rate.  Patient thanked this SW for assistance and reports no additional concerns, at this time.     Jardiance Savings Card: Eligible commercially insured patients may pay as little as $10 with a maximum savings of up to $175 per 30-day supply; for additional information contact the program at [phone].        KEITH NORWOOD -   Ambulatory Case Management    8/24/2022, 10:04  EDT

## 2022-08-25 ENCOUNTER — OFFICE VISIT (OUTPATIENT)
Dept: UROLOGY | Age: 57
End: 2022-08-25

## 2022-08-25 VITALS
HEART RATE: 93 BPM | DIASTOLIC BLOOD PRESSURE: 85 MMHG | WEIGHT: 198 LBS | HEIGHT: 65 IN | BODY MASS INDEX: 32.99 KG/M2 | RESPIRATION RATE: 16 BRPM | SYSTOLIC BLOOD PRESSURE: 137 MMHG

## 2022-08-25 DIAGNOSIS — R97.20 ELEVATED PSA: Primary | ICD-10-CM

## 2022-08-25 PROCEDURE — 99213 OFFICE O/P EST LOW 20 MIN: CPT | Performed by: UROLOGY

## 2022-08-30 ENCOUNTER — TELEPHONE (OUTPATIENT)
Dept: ELECTROPHYSIOLOGY | Age: 57
End: 2022-08-30

## 2022-08-30 ENCOUNTER — OFFICE VISIT (OUTPATIENT)
Dept: ELECTROPHYSIOLOGY | Age: 57
End: 2022-08-30

## 2022-08-30 VITALS
DIASTOLIC BLOOD PRESSURE: 86 MMHG | WEIGHT: 198 LBS | HEIGHT: 65 IN | SYSTOLIC BLOOD PRESSURE: 132 MMHG | BODY MASS INDEX: 32.99 KG/M2

## 2022-08-30 DIAGNOSIS — R55 NEAR SYNCOPE: ICD-10-CM

## 2022-08-30 DIAGNOSIS — I49.5 SINOATRIAL NODE DYSFUNCTION (CMD): Primary | ICD-10-CM

## 2022-08-30 DIAGNOSIS — Z95.0 PACEMAKER: ICD-10-CM

## 2022-08-30 DIAGNOSIS — I47.10 SVT (SUPRAVENTRICULAR TACHYCARDIA): ICD-10-CM

## 2022-08-30 PROCEDURE — 93280 PM DEVICE PROGR EVAL DUAL: CPT | Performed by: INTERNAL MEDICINE

## 2022-09-13 ENCOUNTER — APPOINTMENT (OUTPATIENT)
Dept: ELECTROPHYSIOLOGY | Age: 57
End: 2022-09-13

## 2022-11-11 ENCOUNTER — OFFICE VISIT (OUTPATIENT)
Dept: FAMILY MEDICINE CLINIC | Age: 57
End: 2022-11-11

## 2022-11-11 VITALS
BODY MASS INDEX: 28 KG/M2 | HEART RATE: 76 BPM | TEMPERATURE: 98.2 F | HEIGHT: 70 IN | WEIGHT: 195.6 LBS | SYSTOLIC BLOOD PRESSURE: 144 MMHG | DIASTOLIC BLOOD PRESSURE: 87 MMHG

## 2022-11-11 DIAGNOSIS — I10 ESSENTIAL (PRIMARY) HYPERTENSION: ICD-10-CM

## 2022-11-11 DIAGNOSIS — E11.65 TYPE 2 DIABETES MELLITUS WITH HYPERGLYCEMIA, WITHOUT LONG-TERM CURRENT USE OF INSULIN: ICD-10-CM

## 2022-11-11 DIAGNOSIS — Z00.00 PHYSICAL EXAM: Primary | ICD-10-CM

## 2022-11-11 DIAGNOSIS — M1A.0720 IDIOPATHIC CHRONIC GOUT OF LEFT FOOT WITHOUT TOPHUS: ICD-10-CM

## 2022-11-11 DIAGNOSIS — U07.1 COVID-19: ICD-10-CM

## 2022-11-11 DIAGNOSIS — R05.9 COUGH: ICD-10-CM

## 2022-11-11 DIAGNOSIS — I10 ESSENTIAL HYPERTENSION: ICD-10-CM

## 2022-11-11 DIAGNOSIS — E11.9 TYPE 2 DIABETES MELLITUS WITHOUT COMPLICATION, WITHOUT LONG-TERM CURRENT USE OF INSULIN: ICD-10-CM

## 2022-11-11 DIAGNOSIS — E78.2 MIXED HYPERLIPIDEMIA: ICD-10-CM

## 2022-11-11 PROCEDURE — 99396 PREV VISIT EST AGE 40-64: CPT | Performed by: FAMILY MEDICINE

## 2022-11-11 RX ORDER — GLIPIZIDE 5 MG/1
5 TABLET ORAL
Qty: 180 TABLET | Refills: 1 | Status: SHIPPED | OUTPATIENT
Start: 2022-11-11 | End: 2023-01-12

## 2022-11-11 RX ORDER — METFORMIN HYDROCHLORIDE 500 MG/1
1000 TABLET, EXTENDED RELEASE ORAL
Qty: 180 TABLET | Refills: 1 | Status: SHIPPED | OUTPATIENT
Start: 2022-11-11

## 2022-11-11 RX ORDER — LOSARTAN POTASSIUM 100 MG/1
100 TABLET ORAL DAILY
Qty: 90 TABLET | Refills: 1 | Status: SHIPPED | OUTPATIENT
Start: 2022-11-11 | End: 2023-01-12

## 2022-11-11 RX ORDER — ALLOPURINOL 100 MG/1
200 TABLET ORAL DAILY
Qty: 180 TABLET | Refills: 1 | Status: SHIPPED | OUTPATIENT
Start: 2022-11-11 | End: 2023-01-12

## 2022-11-11 RX ORDER — ALBUTEROL SULFATE 90 UG/1
2 AEROSOL, METERED RESPIRATORY (INHALATION) EVERY 6 HOURS PRN
Qty: 18 G | Refills: 2 | Status: SHIPPED | OUTPATIENT
Start: 2022-11-11 | End: 2022-12-21 | Stop reason: SDUPTHER

## 2022-11-11 NOTE — PROGRESS NOTES
Man Gill presents to [facility].    Chief Complaint:  Annual physical, diabetes, blood pressure, cholesterol    Subjective       History of Present Illness:  Gera is in today for annual physical.  He is 57 years old and works for [employer] locally where he has been for 17+ years.  He does not smoke cigarettes and has not for a long time.  Gera drinks no alcohol currently.  He is up-to-date on colonoscopy which was done about 9 years ago.  He will be due for repeat exam next year.  It looks like he is up-to-date on prostate screening.    Gera also has type 2 diabetes for which he remains on glipizide and metformin.  He has been checking his sugar most days and states that usually runs below 140.  He denies any significantly low blood sugars.  He was prescribed Jardiance, but he did not end up continuing it primarily due to cost.  He has not previously been on injection therapy.    Gera has hypertension and elevated cholesterol as well.  His blood pressure is mildly elevated here.  He does check his pressure at home, and it runs in a fairly good range there.    Review of Systems:  Review of Systems   Constitutional: Negative for chills and fever.   Respiratory: Negative for cough and shortness of breath.    Cardiovascular: Negative for chest pain and palpitations.   Gastrointestinal: Negative for abdominal pain, nausea and vomiting.        Objective   Medical History:  Past Medical History:   • Essential hypertension   • Gout   • Kidney stones   • Mixed hyperlipidemia   • Type 2 diabetes mellitus (HCC)     Past Surgical History:   • COLONOSCOPY    Normal      Family History   Problem Relation Age of Onset   • Hypertension Mother    • Coronary artery disease Father    • Hypertension Father    • Coronary artery disease Brother      Social History     Tobacco Use   • Smoking status: Former     Types: Cigarettes     Quit date:      Years since quittin.8   • Smokeless  "tobacco: Never   Substance Use Topics   • Alcohol use: Not on file       Health Maintenance Due   Topic Date Due   • Hepatitis B (1 of 3 - 3-dose series) Never done   • Pneumococcal Vaccine 0-64 (1 - PCV) Never done   • ZOSTER VACCINE (1 of 2) Never done   • DIABETIC EYE EXAM  Never done   • TDAP/TD VACCINES (3 - Td or Tdap) 08/10/2022        Immunization History   Administered Date(s) Administered   • Td 07/09/2004   • Tdap 08/10/2012       No Known Allergies     Medications:  Current Outpatient Medications on File Prior to Visit   Medication Sig   • Accu-Chek FastClix Lancets misc Check blood sugar once daily for E 11.9   • Aspirin Buf,CaCarb-MgCarb-MgO, 81 MG tablet Take 81 mg by mouth 3 (Three) Times a Week.   • glucose blood (Accu-Chek Guide) test strip Check blood sugar once daily for E 11.9   • [DISCONTINUED] albuterol sulfate  (90 Base) MCG/ACT inhaler Inhale 2 puffs Every 6 (Six) Hours As Needed for Wheezing.   • [DISCONTINUED] allopurinol (ZYLOPRIM) 100 MG tablet Take 2 tablets by mouth Daily.   • [DISCONTINUED] atorvastatin (LIPITOR) 20 MG tablet Take 1 tablet by mouth Daily.   • [DISCONTINUED] glipizide (GLUCOTROL) 5 MG tablet Take 1 tablet by mouth 2 (Two) Times a Day Before Meals.   • [DISCONTINUED] losartan (COZAAR) 100 MG tablet Take 1 tablet by mouth Daily.   • [DISCONTINUED] metFORMIN ER (GLUCOPHAGE-XR) 500 MG 24 hr tablet Take 2 tablets by mouth Daily With Dinner.     No current facility-administered medications on file prior to visit.       Vital Signs:   Vitals:    11/11/22 1551 11/11/22 1628   BP: 151/90 144/87   BP Location: Left arm Left arm   Patient Position: Sitting Sitting   Pulse: 80 76   Temp: 98.2 °F (36.8 °C)    TempSrc: Oral    Weight: 88.7 kg (195 lb 9.6 oz)    Height: 177.8 cm (70\")      Physical Exam:  Physical Exam  Vitals and nursing note reviewed.   Constitutional:       General: He is not in acute distress.     Appearance: He is not ill-appearing.   HENT:      Right " Ear: Tympanic membrane and ear canal normal.      Left Ear: Tympanic membrane and ear canal normal.      Mouth/Throat:      Mouth: Mucous membranes are moist.      Comments: Pharynx appears normal  Eyes:      Extraocular Movements: Extraocular movements intact.      Pupils: Pupils are equal, round, and reactive to light.   Neck:      Thyroid: No thyromegaly.   Cardiovascular:      Rate and Rhythm: Normal rate and regular rhythm.      Pulses:           Dorsalis pedis pulses are 2+ on the right side and 2+ on the left side.      Heart sounds: No murmur heard.  Pulmonary:      Effort: Pulmonary effort is normal.      Breath sounds: Normal breath sounds.   Abdominal:      General: There is no distension.      Palpations: Abdomen is soft. There is no mass.      Tenderness: There is no abdominal tenderness.   Musculoskeletal:      Cervical back: Normal range of motion.   Feet:      Right foot:      Protective Sensation: 3 sites tested. 3 sites sensed.      Skin integrity: Skin integrity normal.      Left foot:      Protective Sensation: 3 sites tested. 3 sites sensed.      Skin integrity: Skin integrity normal.   Skin:     Findings: No lesion or rash.   Neurological:      General: No focal deficit present.      Mental Status: He is oriented to person, place, and time.      Cranial Nerves: No cranial nerve deficit.   Psychiatric:         Mood and Affect: Mood normal.         Result Review      The following data was reviewed by Vadim Mishra MD on 11/11/2022.  Lab Results   Component Value Date    WBC 7.58 05/17/2022    HGB 14.4 05/17/2022    HCT 44.3 05/17/2022    MCV 87.0 05/17/2022     05/17/2022     Lab Results   Component Value Date    GLUCOSE 171 (H) 05/17/2022    BUN 10 05/17/2022    CREATININE 0.91 05/17/2022     05/17/2022    K 4.2 05/17/2022    CL 99 05/17/2022    CO2 25.6 05/17/2022    CALCIUM 9.5 05/17/2022    PROTEINTOT 6.7 05/17/2022    ALBUMIN 4.70 05/17/2022    ALT 49 (H) 05/17/2022     AST 31 05/17/2022    ALKPHOS 98 05/17/2022    BILITOT 0.5 05/17/2022    GLOB 2.0 05/17/2022    AGRATIO 2.4 05/17/2022    BCR 11.0 05/17/2022    ANIONGAP 12.4 05/17/2022      Lab Results   Component Value Date    CHOL 140 05/17/2022    CHLPL 151 04/03/2021    TRIG 158 (H) 05/17/2022    HDL 38 (L) 05/17/2022    LDL 75 05/17/2022     Lab Results   Component Value Date    TSH 2.460 05/17/2022     Lab Results   Component Value Date    HGBA1C 8.00 (H) 08/18/2022     Lab Results   Component Value Date    PSA 0.444 05/17/2022    PSA 0.51 04/03/2021    PSA 0.50 01/14/2020            Assessment and Plan:   Today, we have reviewed his care.  Gustavo is doing well overall and is mostly up-to-date on needed screenings.  He declines vaccines today.  We will go ahead and update labs in a couple of weeks as below.  Otherwise, we will refill his medications and move forward from there.  I did recommend he stop by the office at his convenience with his blood pressure cuff just for us to compare readings.  We will plan to see him back in about 6 months.       Diagnoses and all orders for this visit:    1. Physical exam (Primary)  -     CBC (No Diff); Future  -     Comprehensive Metabolic Panel; Future  -     Lipid Panel; Future  -     TSH; Future  -     Hemoglobin A1c; Future  -     CK; Future  -     Uric Acid; Future    2. Type 2 diabetes mellitus without complication, without long-term current use of insulin (HCC)  -     Comprehensive Metabolic Panel; Future  -     Hemoglobin A1c; Future  -     metFORMIN ER (GLUCOPHAGE-XR) 500 MG 24 hr tablet; Take 2 tablets by mouth Daily With Dinner.  Dispense: 180 tablet; Refill: 1    3. Essential hypertension  -     Comprehensive Metabolic Panel; Future    4. Mixed hyperlipidemia  -     Lipid Panel; Future  -     TSH; Future  -     CK; Future    5. Idiopathic chronic gout of left foot without tophus  -     Uric Acid; Future  -     allopurinol (ZYLOPRIM) 100 MG tablet; Take 2 tablets by mouth Daily.   Dispense: 180 tablet; Refill: 1    6. Type 2 diabetes mellitus with hyperglycemia, without long-term current use of insulin (HCC)  -     glipizide (GLUCOTROL) 5 MG tablet; Take 1 tablet by mouth 2 (Two) Times a Day Before Meals.  Dispense: 180 tablet; Refill: 1    7. Essential (primary) hypertension  -     losartan (COZAAR) 100 MG tablet; Take 1 tablet by mouth Daily.  Dispense: 90 tablet; Refill: 1    8. COVID-19  -     albuterol sulfate  (90 Base) MCG/ACT inhaler; Inhale 2 puffs Every 6 (Six) Hours As Needed for Wheezing.  Dispense: 18 g; Refill: 2    9. Cough  -     albuterol sulfate  (90 Base) MCG/ACT inhaler; Inhale 2 puffs Every 6 (Six) Hours As Needed for Wheezing.  Dispense: 18 g; Refill: 2          Follow Up   Return in about 6 months (around 5/11/2023).  Patient was given instructions and counseling regarding his condition or for health maintenance advice. Please see specific information pulled into the AVS if appropriate.

## 2022-11-14 RX ORDER — CARISOPRODOL 350 MG/1
TABLET ORAL
COMMUNITY
End: 2022-11-16 | Stop reason: ALTCHOICE

## 2022-11-14 RX ORDER — ROSUVASTATIN CALCIUM 10 MG/1
TABLET, COATED ORAL
COMMUNITY
End: 2022-11-16 | Stop reason: ALTCHOICE

## 2022-11-14 RX ORDER — ESZOPICLONE 1 MG/1
1 TABLET, FILM COATED ORAL DAILY
COMMUNITY
End: 2022-11-16 | Stop reason: ALTCHOICE

## 2022-11-14 RX ORDER — METOPROLOL SUCCINATE 25 MG/1
TABLET, EXTENDED RELEASE ORAL
COMMUNITY
End: 2022-11-16

## 2022-11-14 RX ORDER — IPRATROPIUM BROMIDE 42 UG/1
SPRAY, METERED NASAL
COMMUNITY
End: 2023-10-03 | Stop reason: ALTCHOICE

## 2022-11-16 ENCOUNTER — LAB SERVICES (OUTPATIENT)
Dept: LAB | Age: 57
End: 2022-11-16

## 2022-11-16 ENCOUNTER — OFFICE VISIT (OUTPATIENT)
Dept: FAMILY MEDICINE | Age: 57
End: 2022-11-16

## 2022-11-16 VITALS
WEIGHT: 201 LBS | SYSTOLIC BLOOD PRESSURE: 138 MMHG | HEART RATE: 77 BPM | HEIGHT: 65 IN | DIASTOLIC BLOOD PRESSURE: 84 MMHG | RESPIRATION RATE: 16 BRPM | OXYGEN SATURATION: 99 % | TEMPERATURE: 97.7 F | BODY MASS INDEX: 33.49 KG/M2

## 2022-11-16 DIAGNOSIS — M67.431 GANGLION OF RIGHT WRIST: ICD-10-CM

## 2022-11-16 DIAGNOSIS — G47.31 CENTRAL SLEEP APNEA: ICD-10-CM

## 2022-11-16 DIAGNOSIS — R97.20 ELEVATED PSA: ICD-10-CM

## 2022-11-16 DIAGNOSIS — I47.10 SVT (SUPRAVENTRICULAR TACHYCARDIA): ICD-10-CM

## 2022-11-16 DIAGNOSIS — M79.2 NEURITIS OF UPPER EXTREMITY: Primary | ICD-10-CM

## 2022-11-16 DIAGNOSIS — E78.2 MIXED HYPERLIPIDEMIA: ICD-10-CM

## 2022-11-16 DIAGNOSIS — Z95.0 PACEMAKER: ICD-10-CM

## 2022-11-16 DIAGNOSIS — G47.31 COMPLEX SLEEP APNEA SYNDROME: ICD-10-CM

## 2022-11-16 DIAGNOSIS — F51.01 PRIMARY INSOMNIA: ICD-10-CM

## 2022-11-16 DIAGNOSIS — I49.5 SINOATRIAL NODE DYSFUNCTION (CMD): ICD-10-CM

## 2022-11-16 PROCEDURE — 99213 OFFICE O/P EST LOW 20 MIN: CPT | Performed by: PHYSICIAN ASSISTANT

## 2022-11-16 PROCEDURE — 36415 COLL VENOUS BLD VENIPUNCTURE: CPT | Performed by: INTERNAL MEDICINE

## 2022-11-16 PROCEDURE — 84153 ASSAY OF PSA TOTAL: CPT | Performed by: INTERNAL MEDICINE

## 2022-11-16 RX ORDER — PRAVASTATIN SODIUM 40 MG
40 TABLET ORAL DAILY
Qty: 90 TABLET | Refills: 1 | Status: SHIPPED | OUTPATIENT
Start: 2022-11-16

## 2022-11-16 RX ORDER — CYCLOBENZAPRINE HCL 10 MG
10 TABLET ORAL 3 TIMES DAILY PRN
Qty: 90 TABLET | Refills: 3 | Status: SHIPPED | OUTPATIENT
Start: 2022-11-16 | End: 2023-10-03 | Stop reason: ALTCHOICE

## 2022-11-16 RX ORDER — ZOLPIDEM TARTRATE 10 MG/1
10 TABLET ORAL NIGHTLY
Qty: 90 TABLET | Refills: 1 | Status: SHIPPED | OUTPATIENT
Start: 2022-11-16 | End: 2023-04-17 | Stop reason: SDUPTHER

## 2022-11-16 ASSESSMENT — PATIENT HEALTH QUESTIONNAIRE - PHQ9
SUM OF ALL RESPONSES TO PHQ9 QUESTIONS 1 AND 2: 0
1. LITTLE INTEREST OR PLEASURE IN DOING THINGS: NOT AT ALL
CLINICAL INTERPRETATION OF PHQ2 SCORE: NO FURTHER SCREENING NEEDED
2. FEELING DOWN, DEPRESSED OR HOPELESS: NOT AT ALL
SUM OF ALL RESPONSES TO PHQ9 QUESTIONS 1 AND 2: 0

## 2022-11-16 ASSESSMENT — ENCOUNTER SYMPTOMS
DIAPHORESIS: 0
NUMBNESS: 1
DIARRHEA: 0
CHILLS: 0
COUGH: 0
NAUSEA: 0
VOMITING: 0
SHORTNESS OF BREATH: 0
FATIGUE: 0
FEVER: 0
WHEEZING: 0

## 2022-11-17 LAB — PSA SERPL-MCNC: 3.3 NG/ML

## 2022-11-18 ENCOUNTER — TELEPHONE (OUTPATIENT)
Dept: FAMILY MEDICINE CLINIC | Age: 57
End: 2022-11-18

## 2022-11-18 NOTE — TELEPHONE ENCOUNTER
----- Message from Rosa Pratt LPN sent at 11/11/2022  4:20 PM EST -----  Please TICKLE for ordered labs after 11/18/2022.  Thanks.

## 2022-11-21 ENCOUNTER — LAB (OUTPATIENT)
Dept: LAB | Facility: HOSPITAL | Age: 57
End: 2022-11-21

## 2022-11-21 DIAGNOSIS — M1A.0720 IDIOPATHIC CHRONIC GOUT OF LEFT FOOT WITHOUT TOPHUS: ICD-10-CM

## 2022-11-21 DIAGNOSIS — E11.9 TYPE 2 DIABETES MELLITUS WITHOUT COMPLICATION, WITHOUT LONG-TERM CURRENT USE OF INSULIN: ICD-10-CM

## 2022-11-21 DIAGNOSIS — E78.2 MIXED HYPERLIPIDEMIA: ICD-10-CM

## 2022-11-21 DIAGNOSIS — Z00.00 PHYSICAL EXAM: ICD-10-CM

## 2022-11-21 LAB
CHOLEST SERPL-MCNC: 210 MG/DL (ref 0–200)
CK SERPL-CCNC: 79 U/L (ref 20–200)
HBA1C MFR BLD: 7.1 % (ref 4.8–5.6)
HDLC SERPL-MCNC: 38 MG/DL (ref 40–60)
LDLC SERPL CALC-MCNC: 131 MG/DL (ref 0–100)
LDLC/HDLC SERPL: 3.32 {RATIO}
TRIGL SERPL-MCNC: 230 MG/DL (ref 0–150)
TSH SERPL DL<=0.05 MIU/L-ACNC: 2.35 UIU/ML (ref 0.27–4.2)
URATE SERPL-MCNC: 5.5 MG/DL (ref 3.4–7)
VLDLC SERPL-MCNC: 41 MG/DL (ref 5–40)

## 2022-11-21 PROCEDURE — 36415 COLL VENOUS BLD VENIPUNCTURE: CPT

## 2022-11-21 PROCEDURE — 82550 ASSAY OF CK (CPK): CPT

## 2022-11-21 PROCEDURE — 83036 HEMOGLOBIN GLYCOSYLATED A1C: CPT

## 2022-11-21 PROCEDURE — 84550 ASSAY OF BLOOD/URIC ACID: CPT

## 2022-11-21 PROCEDURE — 80061 LIPID PANEL: CPT

## 2022-11-21 PROCEDURE — 84443 ASSAY THYROID STIM HORMONE: CPT

## 2022-12-02 ENCOUNTER — APPOINTMENT (OUTPATIENT)
Dept: FAMILY MEDICINE | Age: 57
End: 2022-12-02

## 2022-12-05 ENCOUNTER — TELEPHONE (OUTPATIENT)
Dept: FAMILY MEDICINE CLINIC | Age: 57
End: 2022-12-05

## 2022-12-06 ENCOUNTER — LAB (OUTPATIENT)
Dept: LAB | Facility: HOSPITAL | Age: 57
End: 2022-12-06

## 2022-12-06 DIAGNOSIS — E11.9 TYPE 2 DIABETES MELLITUS WITHOUT COMPLICATION, WITHOUT LONG-TERM CURRENT USE OF INSULIN: ICD-10-CM

## 2022-12-06 DIAGNOSIS — I10 ESSENTIAL HYPERTENSION: ICD-10-CM

## 2022-12-06 DIAGNOSIS — Z00.00 PHYSICAL EXAM: ICD-10-CM

## 2022-12-06 LAB
ALBUMIN SERPL-MCNC: 4.7 G/DL (ref 3.5–5.2)
ALBUMIN/GLOB SERPL: 2.9 G/DL
ALP SERPL-CCNC: 76 U/L (ref 39–117)
ALT SERPL W P-5'-P-CCNC: 32 U/L (ref 1–41)
ANION GAP SERPL CALCULATED.3IONS-SCNC: 11.4 MMOL/L (ref 5–15)
AST SERPL-CCNC: 23 U/L (ref 1–40)
BILIRUB SERPL-MCNC: 0.3 MG/DL (ref 0–1.2)
BUN SERPL-MCNC: 17 MG/DL (ref 6–20)
BUN/CREAT SERPL: 16.3 (ref 7–25)
CALCIUM SPEC-SCNC: 9.4 MG/DL (ref 8.6–10.5)
CHLORIDE SERPL-SCNC: 102 MMOL/L (ref 98–107)
CO2 SERPL-SCNC: 23.6 MMOL/L (ref 22–29)
CREAT SERPL-MCNC: 1.04 MG/DL (ref 0.76–1.27)
DEPRECATED RDW RBC AUTO: 39.9 FL (ref 37–54)
EGFRCR SERPLBLD CKD-EPI 2021: 82.7 ML/MIN/1.73
ERYTHROCYTE [DISTWIDTH] IN BLOOD BY AUTOMATED COUNT: 12.8 % (ref 12.3–15.4)
GLOBULIN UR ELPH-MCNC: 1.6 GM/DL
GLUCOSE SERPL-MCNC: 94 MG/DL (ref 65–99)
HCT VFR BLD AUTO: 42.7 % (ref 37.5–51)
HGB BLD-MCNC: 13.8 G/DL (ref 13–17.7)
MCH RBC QN AUTO: 27.8 PG (ref 26.6–33)
MCHC RBC AUTO-ENTMCNC: 32.3 G/DL (ref 31.5–35.7)
MCV RBC AUTO: 85.9 FL (ref 79–97)
PLATELET # BLD AUTO: 211 10*3/MM3 (ref 140–450)
PMV BLD AUTO: 10.1 FL (ref 6–12)
POTASSIUM SERPL-SCNC: 3.8 MMOL/L (ref 3.5–5.2)
PROT SERPL-MCNC: 6.3 G/DL (ref 6–8.5)
RBC # BLD AUTO: 4.97 10*6/MM3 (ref 4.14–5.8)
SODIUM SERPL-SCNC: 137 MMOL/L (ref 136–145)
WBC NRBC COR # BLD: 8.33 10*3/MM3 (ref 3.4–10.8)

## 2022-12-06 PROCEDURE — 36415 COLL VENOUS BLD VENIPUNCTURE: CPT

## 2022-12-06 PROCEDURE — 85027 COMPLETE CBC AUTOMATED: CPT

## 2022-12-06 PROCEDURE — 80053 COMPREHEN METABOLIC PANEL: CPT

## 2022-12-07 RX ORDER — PRAVASTATIN SODIUM 20 MG
20 TABLET ORAL NIGHTLY
Qty: 90 TABLET | Refills: 3 | Status: SHIPPED | OUTPATIENT
Start: 2022-12-07

## 2022-12-21 ENCOUNTER — OFFICE VISIT (OUTPATIENT)
Dept: FAMILY MEDICINE CLINIC | Age: 57
End: 2022-12-21

## 2022-12-21 VITALS
BODY MASS INDEX: 26.8 KG/M2 | DIASTOLIC BLOOD PRESSURE: 88 MMHG | OXYGEN SATURATION: 95 % | WEIGHT: 187.2 LBS | SYSTOLIC BLOOD PRESSURE: 149 MMHG | HEART RATE: 83 BPM | TEMPERATURE: 98.9 F | HEIGHT: 70 IN

## 2022-12-21 DIAGNOSIS — Z77.22 SECOND HAND SMOKE EXPOSURE: ICD-10-CM

## 2022-12-21 DIAGNOSIS — J40 BRONCHITIS: Primary | ICD-10-CM

## 2022-12-21 LAB
EXPIRATION DATE: NORMAL
FLUAV AG UPPER RESP QL IA.RAPID: NOT DETECTED
FLUBV AG UPPER RESP QL IA.RAPID: NOT DETECTED
INTERNAL CONTROL: NORMAL
Lab: NORMAL
SARS-COV-2 AG UPPER RESP QL IA.RAPID: NOT DETECTED

## 2022-12-21 PROCEDURE — 87428 SARSCOV & INF VIR A&B AG IA: CPT | Performed by: NURSE PRACTITIONER

## 2022-12-21 PROCEDURE — 96372 THER/PROPH/DIAG INJ SC/IM: CPT | Performed by: NURSE PRACTITIONER

## 2022-12-21 PROCEDURE — 99213 OFFICE O/P EST LOW 20 MIN: CPT | Performed by: NURSE PRACTITIONER

## 2022-12-21 RX ORDER — ALBUTEROL SULFATE 90 UG/1
2 AEROSOL, METERED RESPIRATORY (INHALATION) EVERY 4 HOURS PRN
Qty: 18 G | Refills: 2 | Status: SHIPPED | OUTPATIENT
Start: 2022-12-21

## 2022-12-21 RX ORDER — TRIAMCINOLONE ACETONIDE 40 MG/ML
60 INJECTION, SUSPENSION INTRA-ARTICULAR; INTRAMUSCULAR ONCE
Status: COMPLETED | OUTPATIENT
Start: 2022-12-21 | End: 2022-12-21

## 2022-12-21 RX ADMIN — TRIAMCINOLONE ACETONIDE 60 MG: 40 INJECTION, SUSPENSION INTRA-ARTICULAR; INTRAMUSCULAR at 12:54

## 2022-12-21 NOTE — PROGRESS NOTES
Chief Complaint  Man Gill presents to [facility] for URI (Cough, chest congestion. Pt states this is the best he has felt in the last 3 weeks. )    Subjective          History of Present Illness  Here for hx of dry cough x 3 weeks, no fever has been taking Nyquil and Dayquil which helps but cough will not go away. Is not a smoker but wife smokes in all rooms of the house. + hx of DM. Gout and Htn.     Review of Systems   Constitutional: Negative.  Negative for fatigue and fever.   HENT: Negative.  Negative for ear pain, sinus pressure, sore throat and swollen glands.    Eyes: Negative.    Respiratory: Positive for cough. Negative for shortness of breath and wheezing.    Cardiovascular: Negative for chest pain, palpitations and leg swelling.   Gastrointestinal: Negative.  Negative for abdominal pain.   Genitourinary: Negative for decreased urine volume, dysuria, frequency and nocturia.   Musculoskeletal: Negative for gait problem.   Skin: Negative.    Neurological: Negative for tremors, seizures, weakness, memory problem and confusion.   Psychiatric/Behavioral: Negative.  Negative for self-injury and suicidal ideas.         No Known Allergies   Past Medical History:   Diagnosis Date   • Essential hypertension    • Gout    • Kidney stones    • Mixed hyperlipidemia    • Type 2 diabetes mellitus (HCC)      Current Outpatient Medications   Medication Sig Dispense Refill   • Accu-Chek FastClix Lancets misc Check blood sugar once daily for E 11.9 100 each 3   • albuterol sulfate  (90 Base) MCG/ACT inhaler Inhale 2 puffs Every 4 (Four) Hours As Needed for Wheezing or Shortness of Air (cough). 18 g 2   • allopurinol (ZYLOPRIM) 100 MG tablet Take 2 tablets by mouth Daily. 180 tablet 1   • Aspirin Buf,CaCarb-MgCarb-MgO, 81 MG tablet Take 81 mg by mouth 3 (Three) Times a Week.     • glipizide (GLUCOTROL) 5 MG tablet Take 1 tablet by mouth 2 (Two) Times a Day Before Meals. 180  "tablet 1   • glucose blood (Accu-Chek Guide) test strip Check blood sugar once daily for E 11.9 100 each 3   • losartan (COZAAR) 100 MG tablet Take 1 tablet by mouth Daily. 90 tablet 1   • metFORMIN ER (GLUCOPHAGE-XR) 500 MG 24 hr tablet Take 2 tablets by mouth Daily With Dinner. 180 tablet 1   • pravastatin (PRAVACHOL) 20 MG tablet Take 1 tablet by mouth Every Night. 90 tablet 3     No current facility-administered medications for this visit.     Past Surgical History:   Procedure Laterality Date   • COLONOSCOPY  11/15/2013    Normal      Social History     Tobacco Use   • Smoking status: Former     Types: Cigarettes     Quit date:      Years since quittin.9   • Smokeless tobacco: Never     Family History   Problem Relation Age of Onset   • Hypertension Mother    • Coronary artery disease Father    • Hypertension Father    • Coronary artery disease Brother      Health Maintenance Due   Topic Date Due   • Hepatitis B (1 of 3 - 3-dose series) Never done   • Pneumococcal Vaccine 0-64 (1 - PCV) Never done   • ZOSTER VACCINE (1 of 2) Never done   • DIABETIC EYE EXAM  Never done   • TDAP/TD VACCINES (3 - Td or Tdap) 08/10/2022      Immunization History   Administered Date(s) Administered   • Td 2004   • Tdap 08/10/2012        Objective     Vitals:    22 1128 22 1206   BP: 156/93 149/88   BP Location: Right arm Right arm   Patient Position: Sitting Sitting   Cuff Size:  Adult   Pulse: 83    Temp: 98.9 °F (37.2 °C)    TempSrc: Oral    SpO2: 95%    Weight: 84.9 kg (187 lb 3.2 oz)    Height: 177.8 cm (70\")      Body mass index is 26.86 kg/m².     Physical Exam  Constitutional:       Appearance: Normal appearance.   HENT:      Right Ear: Tympanic membrane normal.      Left Ear: Tympanic membrane normal.      Nose: Nose normal.      Mouth/Throat:      Pharynx: No oropharyngeal exudate or posterior oropharyngeal erythema.   Neck:      Vascular: No carotid bruit.   Cardiovascular:      Rate and Rhythm: " Normal rate and regular rhythm.      Heart sounds: Normal heart sounds.   Pulmonary:      Effort: Pulmonary effort is normal.      Breath sounds: Normal breath sounds.   Musculoskeletal:         General: Normal range of motion.   Lymphadenopathy:      Cervical: No cervical adenopathy.   Skin:     General: Skin is warm and dry.   Neurological:      General: No focal deficit present.      Mental Status: He is alert.   Psychiatric:         Mood and Affect: Mood normal.         Behavior: Behavior normal.           Result Review :    Office Visit on 12/21/2022   Component Date Value Ref Range Status   • SARS Antigen 12/21/2022 Not Detected  Not Detected, Presumptive Negative Final   • Influenza A Antigen ARTURO 12/21/2022 Not Detected  Not Detected Final   • Influenza B Antigen ARTURO 12/21/2022 Not Detected  Not Detected Final   • Internal Control 12/21/2022 Passed  Passed Final   • Lot Number 12/21/2022 [id number]   Final   • Expiration Date 12/21/2022 2/11/23   Final   Lab on 12/06/2022   Component Date Value Ref Range Status   • Glucose 12/06/2022 94  65 - 99 mg/dL Final   • BUN 12/06/2022 17  6 - 20 mg/dL Final   • Creatinine 12/06/2022 1.04  0.76 - 1.27 mg/dL Final   • Sodium 12/06/2022 137  136 - 145 mmol/L Final   • Potassium 12/06/2022 3.8  3.5 - 5.2 mmol/L Final   • Chloride 12/06/2022 102  98 - 107 mmol/L Final   • CO2 12/06/2022 23.6  22.0 - 29.0 mmol/L Final   • Calcium 12/06/2022 9.4  8.6 - 10.5 mg/dL Final   • Total Protein 12/06/2022 6.3  6.0 - 8.5 g/dL Final   • Albumin 12/06/2022 4.70  3.50 - 5.20 g/dL Final   • ALT (SGPT) 12/06/2022 32  1 - 41 U/L Final   • AST (SGOT) 12/06/2022 23  1 - 40 U/L Final   • Alkaline Phosphatase 12/06/2022 76  39 - 117 U/L Final   • Total Bilirubin 12/06/2022 0.3  0.0 - 1.2 mg/dL Final   • Globulin 12/06/2022 1.6  gm/dL Final   • A/G Ratio 12/06/2022 2.9  g/dL Final   • BUN/Creatinine Ratio 12/06/2022 16.3  7.0 - 25.0 Final   • Anion Gap 12/06/2022 11.4  5.0 - 15.0 mmol/L Final   •  eGFR 12/06/2022 82.7  >60.0 mL/min/1.73 Final   Lab on 11/21/2022   Component Date Value Ref Range Status   • WBC 12/06/2022 8.33  3.40 - 10.80 10*3/mm3 Final   • RBC 12/06/2022 4.97  4.14 - 5.80 10*6/mm3 Final   • Hemoglobin 12/06/2022 13.8  13.0 - 17.7 g/dL Final   • Hematocrit 12/06/2022 42.7  37.5 - 51.0 % Final   • MCV 12/06/2022 85.9  79.0 - 97.0 fL Final   • MCH 12/06/2022 27.8  26.6 - 33.0 pg Final   • MCHC 12/06/2022 32.3  31.5 - 35.7 g/dL Final   • RDW 12/06/2022 12.8  12.3 - 15.4 % Final   • RDW-SD 12/06/2022 39.9  37.0 - 54.0 fl Final   • MPV 12/06/2022 10.1  6.0 - 12.0 fL Final   • Platelets 12/06/2022 211  140 - 450 10*3/mm3 Final   • Total Cholesterol 11/21/2022 210 (H)  0 - 200 mg/dL Final   • Triglycerides 11/21/2022 230 (H)  0 - 150 mg/dL Final   • HDL Cholesterol 11/21/2022 38 (L)  40 - 60 mg/dL Final   • LDL Cholesterol  11/21/2022 131 (H)  0 - 100 mg/dL Final   • VLDL Cholesterol 11/21/2022 41 (H)  5 - 40 mg/dL Final   • LDL/HDL Ratio 11/21/2022 3.32   Final   • TSH 11/21/2022 2.350  0.270 - 4.200 uIU/mL Final   • Hemoglobin A1C 11/21/2022 7.10 (H)  4.80 - 5.60 % Final   • Creatine Kinase 11/21/2022 79  20 - 200 U/L Final   • Uric Acid 11/21/2022 5.5  3.4 - 7.0 mg/dL Final   Lab on 08/18/2022   Component Date Value Ref Range Status   • Hemoglobin A1C 08/18/2022 8.00 (H)  4.80 - 5.60 % Final   • Lyme Total Antibody EIA 08/18/2022 Negative  Negative Final   • Creatine Kinase 08/18/2022 299 (H)  20 - 200 U/L Final                        Assessment and Plan      Diagnoses and all orders for this visit:    1. Bronchitis (Primary)  -     POCT SARS-CoV-2 Antigen ARTURO + Flu  -     albuterol sulfate  (90 Base) MCG/ACT inhaler; Inhale 2 puffs Every 4 (Four) Hours As Needed for Wheezing or Shortness of Air (cough).  Dispense: 18 g; Refill: 2  -     triamcinolone acetonide (KENALOG-40) injection 60 mg    2. Second hand smoke exposure  -     albuterol sulfate  (90 Base) MCG/ACT inhaler; Inhale 2  puffs Every 4 (Four) Hours As Needed for Wheezing or Shortness of Air (cough).  Dispense: 18 g; Refill: 2            Follow Up     Return if symptoms worsen or fail to improve.

## 2023-01-10 ENCOUNTER — OFFICE VISIT (OUTPATIENT)
Dept: FAMILY MEDICINE CLINIC | Age: 58
End: 2023-01-10
Payer: COMMERCIAL

## 2023-01-10 VITALS
HEART RATE: 77 BPM | DIASTOLIC BLOOD PRESSURE: 85 MMHG | BODY MASS INDEX: 28.07 KG/M2 | WEIGHT: 195.6 LBS | TEMPERATURE: 97.5 F | OXYGEN SATURATION: 97 % | SYSTOLIC BLOOD PRESSURE: 140 MMHG

## 2023-01-10 DIAGNOSIS — M25.511 CHRONIC RIGHT SHOULDER PAIN: ICD-10-CM

## 2023-01-10 DIAGNOSIS — G89.29 CHRONIC RIGHT SHOULDER PAIN: ICD-10-CM

## 2023-01-10 DIAGNOSIS — M79.601 RIGHT ARM PAIN: Primary | ICD-10-CM

## 2023-01-10 PROCEDURE — 99213 OFFICE O/P EST LOW 20 MIN: CPT

## 2023-01-10 NOTE — PROGRESS NOTES
Subjective     CHIEF COMPLAINT    Chief Complaint   Patient presents with   • Arm Pain     RIGHT ARM, shocking pain from shoulder, hurts when lifting up. Somewhat a 8 pain scale, but sometimes it doesn't bother him until he goes to move it.        History of Present Illness  Patient is a 57 year old male who presents to the clinic today with complaints of right arm pain. This has been present for 1 year on and off. Has improved somewhat. Denies any known trauma or injuries. Pain appears to start at the shoulder. Denies any numbness, weakness or tingling to the right hand. Patient is right handed. Describes pain as sharp only when he lifts his arm to a certain spot. He has not tried much at home for his symptoms. 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 Pain is sometimes an 8/10. States he is really here today because his wife made him the appointment.       Review of Systems   Constitutional: Negative for chills and fever.   Respiratory: Negative for shortness of breath and wheezing.    Cardiovascular: Negative for chest pain.   Musculoskeletal: Positive for arthralgias.        No Known Allergies    Current Outpatient Medications on File Prior to Visit   Medication Sig Dispense Refill   • Accu-Chek FastClix Lancets misc Check blood sugar once daily for E 11.9 100 each 3   • albuterol sulfate  (90 Base) MCG/ACT inhaler Inhale 2 puffs Every 4 (Four) Hours As Needed for Wheezing or Shortness of Air (cough). 18 g 2   • allopurinol (ZYLOPRIM) 100 MG tablet Take 2 tablets by mouth Daily. 180 tablet 1   • Aspirin Buf,CaCarb-MgCarb-MgO, 81 MG tablet Take 81 mg by mouth 3 (Three) Times a Week.     • glipizide (GLUCOTROL) 5 MG tablet Take 1 tablet by mouth 2 (Two) Times a Day Before Meals. 180 tablet 1   • glucose blood (Accu-Chek Guide) test strip Check blood sugar once daily for E 11.9 100 each 3   • losartan (COZAAR) 100 MG tablet Take 1 tablet by mouth Daily. 90 tablet 1   • metFORMIN ER  (GLUCOPHAGE-XR) 500 MG 24 hr tablet Take 2 tablets by mouth Daily With Dinner. 180 tablet 1   • pravastatin (PRAVACHOL) 20 MG tablet Take 1 tablet by mouth Every Night. 90 tablet 3     No current facility-administered medications on file prior to visit.       /85 (BP Location: Left arm, Patient Position: Sitting)   Pulse 77   Temp 97.5 °F (36.4 °C) (Oral)   Wt 88.7 kg (195 lb 9.6 oz)   SpO2 97%   BMI 28.07 kg/m²       Objective     Physical Exam  Vitals and nursing note reviewed.   Constitutional:       General: He is not in acute distress.     Appearance: Normal appearance. He is not ill-appearing.   HENT:      Head: Normocephalic.      Right Ear: Hearing normal.      Left Ear: Hearing normal.   Eyes:      Extraocular Movements: Extraocular movements intact.   Cardiovascular:      Rate and Rhythm: Normal rate and regular rhythm.      Heart sounds: Normal heart sounds. No murmur heard.  Pulmonary:      Effort: Pulmonary effort is normal. No accessory muscle usage or respiratory distress.      Breath sounds: Normal breath sounds. No wheezing or rhonchi.   Musculoskeletal:      Right shoulder: No swelling or deformity. Decreased range of motion (due to pain). Normal strength. Normal pulse.      Right upper arm: No swelling, deformity or tenderness.      Cervical back: Normal range of motion.      Comments: Patient reports pain with shoulder abduction at 90 degrees    Skin:     General: Skin is warm and dry.   Neurological:      General: No focal deficit present.      Mental Status: He is alert and oriented to person, place, and time.   Psychiatric:         Mood and Affect: Mood normal.             Diagnoses and all orders for this visit:    1. Right arm pain (Primary)    2. Chronic right shoulder pain    Pain appears chronic in nature, has been present for 1 year. We discussed options for further workup including xrays, patient declines at this time and would like to try conservative treatment. Advised on  rest, ice, antiinflammatories. Can also use Tylenol. Consider PT. Return to clinic if no improvement. Discussed ER precautions. Patient voiced understanding of all instructions and had no further questions at this time.       Follow up:  Return if symptoms worsen or fail to improve.  Patient was given instructions and counseling regarding his condition or for health maintenance advice. Please see specific information pulled into the AVS if appropriate.

## 2023-01-11 DIAGNOSIS — E78.2 MIXED HYPERLIPIDEMIA: ICD-10-CM

## 2023-01-12 DIAGNOSIS — M1A.0720 IDIOPATHIC CHRONIC GOUT OF LEFT FOOT WITHOUT TOPHUS: ICD-10-CM

## 2023-01-12 DIAGNOSIS — E11.65 TYPE 2 DIABETES MELLITUS WITH HYPERGLYCEMIA, WITHOUT LONG-TERM CURRENT USE OF INSULIN: ICD-10-CM

## 2023-01-12 DIAGNOSIS — I10 ESSENTIAL (PRIMARY) HYPERTENSION: ICD-10-CM

## 2023-01-12 RX ORDER — ATORVASTATIN CALCIUM 20 MG/1
TABLET, FILM COATED ORAL
Qty: 90 TABLET | Refills: 1 | OUTPATIENT
Start: 2023-01-12

## 2023-01-12 RX ORDER — GLIPIZIDE 5 MG/1
TABLET ORAL
Qty: 180 TABLET | Refills: 1 | Status: SHIPPED | OUTPATIENT
Start: 2023-01-12

## 2023-01-12 RX ORDER — LOSARTAN POTASSIUM 100 MG/1
TABLET ORAL
Qty: 90 TABLET | Refills: 1 | Status: SHIPPED | OUTPATIENT
Start: 2023-01-12

## 2023-01-12 RX ORDER — ALLOPURINOL 100 MG/1
TABLET ORAL
Qty: 180 TABLET | Refills: 1 | Status: SHIPPED | OUTPATIENT
Start: 2023-01-12

## 2023-01-13 DIAGNOSIS — E78.2 MIXED HYPERLIPIDEMIA: ICD-10-CM

## 2023-01-13 RX ORDER — ATORVASTATIN CALCIUM 20 MG/1
TABLET, FILM COATED ORAL
Qty: 90 TABLET | Refills: 1 | OUTPATIENT
Start: 2023-01-13

## 2023-01-16 DIAGNOSIS — E78.2 MIXED HYPERLIPIDEMIA: ICD-10-CM

## 2023-01-16 RX ORDER — ATORVASTATIN CALCIUM 20 MG/1
TABLET, FILM COATED ORAL
Qty: 90 TABLET | Refills: 1 | OUTPATIENT
Start: 2023-01-16

## 2023-02-22 ENCOUNTER — TELEPHONE (OUTPATIENT)
Dept: FAMILY MEDICINE CLINIC | Age: 58
End: 2023-02-22
Payer: COMMERCIAL

## 2023-02-22 NOTE — TELEPHONE ENCOUNTER
----- Message from Jackie Hairston LPN sent at 12/7/2022  9:31 AM EST -----  TICKLE fasting labs after 2/21/2023

## 2023-02-27 ENCOUNTER — LAB (OUTPATIENT)
Dept: LAB | Facility: HOSPITAL | Age: 58
End: 2023-02-27
Payer: COMMERCIAL

## 2023-02-27 DIAGNOSIS — E78.2 MIXED HYPERLIPIDEMIA: ICD-10-CM

## 2023-02-27 DIAGNOSIS — E11.65 TYPE 2 DIABETES MELLITUS WITH HYPERGLYCEMIA, WITHOUT LONG-TERM CURRENT USE OF INSULIN: ICD-10-CM

## 2023-02-27 LAB
ALT SERPL W P-5'-P-CCNC: 30 U/L (ref 1–41)
CHOLEST SERPL-MCNC: 172 MG/DL (ref 0–200)
CK SERPL-CCNC: 111 U/L (ref 20–200)
HBA1C MFR BLD: 7.1 % (ref 4.8–5.6)
HDLC SERPL-MCNC: 35 MG/DL (ref 40–60)
LDLC SERPL CALC-MCNC: 95 MG/DL (ref 0–100)
LDLC/HDLC SERPL: 2.5 {RATIO}
TRIGL SERPL-MCNC: 248 MG/DL (ref 0–150)
VLDLC SERPL-MCNC: 42 MG/DL (ref 5–40)

## 2023-02-27 PROCEDURE — 84460 ALANINE AMINO (ALT) (SGPT): CPT

## 2023-02-27 PROCEDURE — 82550 ASSAY OF CK (CPK): CPT

## 2023-02-27 PROCEDURE — 36415 COLL VENOUS BLD VENIPUNCTURE: CPT

## 2023-02-27 PROCEDURE — 83036 HEMOGLOBIN GLYCOSYLATED A1C: CPT

## 2023-02-27 PROCEDURE — 80061 LIPID PANEL: CPT

## 2023-03-02 ENCOUNTER — OFFICE VISIT (OUTPATIENT)
Dept: UROLOGY | Age: 58
End: 2023-03-02

## 2023-03-02 VITALS
HEIGHT: 65 IN | HEART RATE: 79 BPM | WEIGHT: 205.2 LBS | SYSTOLIC BLOOD PRESSURE: 137 MMHG | BODY MASS INDEX: 34.19 KG/M2 | RESPIRATION RATE: 16 BRPM | DIASTOLIC BLOOD PRESSURE: 84 MMHG

## 2023-03-02 DIAGNOSIS — R97.20 ELEVATED PSA: Primary | ICD-10-CM

## 2023-03-02 PROCEDURE — 99214 OFFICE O/P EST MOD 30 MIN: CPT | Performed by: UROLOGY

## 2023-03-07 ENCOUNTER — OFFICE VISIT (OUTPATIENT)
Dept: ELECTROPHYSIOLOGY | Age: 58
End: 2023-03-07

## 2023-03-07 VITALS
DIASTOLIC BLOOD PRESSURE: 84 MMHG | HEART RATE: 72 BPM | WEIGHT: 200 LBS | SYSTOLIC BLOOD PRESSURE: 126 MMHG | HEIGHT: 66 IN | BODY MASS INDEX: 32.14 KG/M2

## 2023-03-07 DIAGNOSIS — Z95.0 PACEMAKER: ICD-10-CM

## 2023-03-07 DIAGNOSIS — I49.5 SINOATRIAL NODE DYSFUNCTION (CMD): Primary | ICD-10-CM

## 2023-03-07 DIAGNOSIS — I47.10 SVT (SUPRAVENTRICULAR TACHYCARDIA): ICD-10-CM

## 2023-03-07 PROCEDURE — 93280 PM DEVICE PROGR EVAL DUAL: CPT | Performed by: INTERNAL MEDICINE

## 2023-03-14 ENCOUNTER — HOSPITAL ENCOUNTER (OUTPATIENT)
Dept: GENERAL RADIOLOGY | Facility: HOSPITAL | Age: 58
Discharge: HOME | End: 2023-03-14
Admitting: NURSE PRACTITIONER
Payer: COMMERCIAL

## 2023-03-14 ENCOUNTER — OFFICE VISIT (OUTPATIENT)
Dept: FAMILY MEDICINE CLINIC | Age: 58
End: 2023-03-14
Payer: COMMERCIAL

## 2023-03-14 ENCOUNTER — TELEPHONE (OUTPATIENT)
Dept: FAMILY MEDICINE CLINIC | Age: 58
End: 2023-03-14

## 2023-03-14 VITALS
SYSTOLIC BLOOD PRESSURE: 156 MMHG | WEIGHT: 194 LBS | BODY MASS INDEX: 27.77 KG/M2 | OXYGEN SATURATION: 98 % | DIASTOLIC BLOOD PRESSURE: 85 MMHG | HEART RATE: 80 BPM | HEIGHT: 70 IN | TEMPERATURE: 98.3 F

## 2023-03-14 DIAGNOSIS — G89.29 CHRONIC RIGHT SHOULDER PAIN: ICD-10-CM

## 2023-03-14 DIAGNOSIS — I10 ESSENTIAL HYPERTENSION: ICD-10-CM

## 2023-03-14 DIAGNOSIS — M25.511 CHRONIC RIGHT SHOULDER PAIN: ICD-10-CM

## 2023-03-14 DIAGNOSIS — M25.511 CHRONIC RIGHT SHOULDER PAIN: Primary | ICD-10-CM

## 2023-03-14 DIAGNOSIS — G89.29 CHRONIC RIGHT SHOULDER PAIN: Primary | ICD-10-CM

## 2023-03-14 PROCEDURE — 73030 X-RAY EXAM OF SHOULDER: CPT

## 2023-03-14 PROCEDURE — 99213 OFFICE O/P EST LOW 20 MIN: CPT | Performed by: NURSE PRACTITIONER

## 2023-03-14 NOTE — PROGRESS NOTES
"Chief Complaint  Man Gill presents to [facility] for Arm Pain ((R) pt c/o of \"stabbing\" pain. Ongoing for almost a year. ) and Vomiting (Ongoing since today. Pt got sick at around lunch time today and left work, he states he feels better now. Pt denies fever, chills, body aches, and cough. )      Subjective     History of Present Illness  Vomiting started this morning  But feels better now- declines testing at this time.     Right shoulder has been bothering him for about a year.  Pain is worse when reaching in front/overhead.  He is unable to reach behind heat.  No previous treatment.  He reports that he does have some difficulty with lowering arm due to pain.  No previous injury to shoulder.  He does work on a [employer] machine at work but no repetitive movement         Assessment and Plan       Diagnoses and all orders for this visit:    1. Chronic right shoulder pain (Primary)  Comments:  discussed long term management - he still has full ROM- advised of recommendations for PT / NSAIDs and follow up with PCP after PT for further recommendations   Orders:  -     XR Shoulder 2+ View Right; Future  -     Ambulatory Referral to Physical Therapy    2. Essential hypertension  Comments:  elevated today, discussed to monitor at home and follow up if remains elevated for further recommendations         Follow Up   Return in about 8 weeks (around 5/9/2023) for Recheck, Follow up with PCP as recommended.      No orders of the defined types were placed in this encounter.      There are no discontinued medications.         Review of Systems    Objective     Vitals:    03/14/23 1508   BP: 156/85   BP Location: Left arm   Patient Position: Sitting   Cuff Size: Small Adult   Pulse: 80   Temp: 98.3 °F (36.8 °C)   TempSrc: Oral   SpO2: 98%   Weight: 88 kg (194 lb)   Height: 177.8 cm (70\")     Body mass index is 27.84 kg/m².     Physical Exam  Vitals reviewed.   Constitutional:       " Appearance: Normal appearance.   HENT:      Head: Normocephalic.   Eyes:      Pupils: Pupils are equal, round, and reactive to light.   Cardiovascular:      Rate and Rhythm: Normal rate and regular rhythm.      Heart sounds: No murmur heard.  Pulmonary:      Effort: Pulmonary effort is normal.      Breath sounds: Normal breath sounds.   Musculoskeletal:         General: Normal range of motion.      Right shoulder: Decreased strength (right shoulder with limitations with extension ).   Neurological:      Mental Status: He is alert.   Psychiatric:         Mood and Affect: Mood normal.         Behavior: Behavior normal.            Result Review                       No Known Allergies   Past Medical History:   Diagnosis Date   • Essential hypertension    • Gout    • Kidney stones    • Mixed hyperlipidemia    • Type 2 diabetes mellitus (HCC)      Current Outpatient Medications   Medication Sig Dispense Refill   • Accu-Chek FastClix Lancets misc Check blood sugar once daily for E 11.9 100 each 3   • albuterol sulfate  (90 Base) MCG/ACT inhaler Inhale 2 puffs Every 4 (Four) Hours As Needed for Wheezing or Shortness of Air (cough). 18 g 2   • allopurinol (ZYLOPRIM) 100 MG tablet TAKE TWO TABLETS BY MOUTH EVERY  tablet 1   • Aspirin Buf,CaCarb-MgCarb-MgO, 81 MG tablet Take 81 mg by mouth 3 (Three) Times a Week.     • glipizide (GLUCOTROL) 5 MG tablet TAKE ONE TABLET BY MOUTH TWICE DAILY BEFORE meals 180 tablet 1   • glucose blood (Accu-Chek Guide) test strip Check blood sugar once daily for E 11.9 100 each 3   • losartan (COZAAR) 100 MG tablet TAKE ONE TABLET BY MOUTH EVERY DAY 90 tablet 1   • metFORMIN ER (GLUCOPHAGE-XR) 500 MG 24 hr tablet Take 2 tablets by mouth Daily With Dinner. 180 tablet 1   • pravastatin (PRAVACHOL) 20 MG tablet Take 1 tablet by mouth Every Night. 90 tablet 3     No current facility-administered medications for this visit.     Past Surgical History:   Procedure Laterality Date   •  COLONOSCOPY  11/15/2013    Normal      Health Maintenance Due   Topic Date Due   • Hepatitis B (1 of 3 - 3-dose series) Never done   • Pneumococcal Vaccine 0-64 (1 - PCV) Never done   • ZOSTER VACCINE (1 of 2) Never done   • DIABETIC EYE EXAM  Never done   • TDAP/TD VACCINES (3 - Td or Tdap) 08/10/2022      Immunization History   Administered Date(s) Administered   • Td 07/09/2004   • Tdap 08/10/2012           EMR Dragon/Transcription disclaimer:  This encounter note may contain some electronic transcription/translation of spoken language to printed text. The electronic translation of spoken language may permit erroneous, or at times, nonsensical words or phrases to be inadvertently transcribed; Although I have reviewed the note for such errors, some may still exist.        Kamilla Costa, APRN

## 2023-03-14 NOTE — TELEPHONE ENCOUNTER
Caller: SYD ROBLES    Relationship to patient: Emergency Contact    Best call back number: [phone]    Patient is needing: PATIENT'S WIFE CALLED IN AND SAID THAT PATIENT IS STILL HAVING A LOT OF PAIN IN RIGHT ARM AND IS REQUESTING AN ORDER FOR AN X-RAY PLEASE

## 2023-04-12 ENCOUNTER — TELEPHONE (OUTPATIENT)
Dept: FAMILY MEDICINE | Age: 58
End: 2023-04-12

## 2023-04-12 DIAGNOSIS — Z12.5 PROSTATE CANCER SCREENING: Primary | ICD-10-CM

## 2023-04-12 DIAGNOSIS — Z12.11 COLON CANCER SCREENING: ICD-10-CM

## 2023-04-17 ENCOUNTER — OFFICE VISIT (OUTPATIENT)
Dept: FAMILY MEDICINE | Age: 58
End: 2023-04-17

## 2023-04-17 ENCOUNTER — LAB SERVICES (OUTPATIENT)
Dept: LAB | Age: 58
End: 2023-04-17

## 2023-04-17 VITALS
OXYGEN SATURATION: 99 % | RESPIRATION RATE: 16 BRPM | HEART RATE: 79 BPM | TEMPERATURE: 98.2 F | SYSTOLIC BLOOD PRESSURE: 128 MMHG | BODY MASS INDEX: 34.32 KG/M2 | DIASTOLIC BLOOD PRESSURE: 74 MMHG | HEIGHT: 65 IN | WEIGHT: 206 LBS

## 2023-04-17 DIAGNOSIS — Z12.5 PROSTATE CANCER SCREENING: ICD-10-CM

## 2023-04-17 DIAGNOSIS — Z00.00 GENERAL MEDICAL EXAMINATION: Primary | ICD-10-CM

## 2023-04-17 DIAGNOSIS — Z95.0 PACEMAKER: ICD-10-CM

## 2023-04-17 DIAGNOSIS — F51.01 PRIMARY INSOMNIA: ICD-10-CM

## 2023-04-17 DIAGNOSIS — I49.5 SINOATRIAL NODE DYSFUNCTION (CMD): ICD-10-CM

## 2023-04-17 DIAGNOSIS — E78.2 MIXED HYPERLIPIDEMIA: ICD-10-CM

## 2023-04-17 DIAGNOSIS — G47.31 CENTRAL SLEEP APNEA: ICD-10-CM

## 2023-04-17 LAB
BASOPHILS # BLD: 0 K/MCL (ref 0–0.3)
BASOPHILS NFR BLD: 1 %
DEPRECATED RDW RBC: 41.7 FL (ref 39–50)
EOSINOPHIL # BLD: 0.1 K/MCL (ref 0–0.5)
EOSINOPHIL NFR BLD: 1 %
ERYTHROCYTE [DISTWIDTH] IN BLOOD: 12.7 % (ref 11–15)
HCT VFR BLD CALC: 48.9 % (ref 39–51)
HGB BLD-MCNC: 16.4 G/DL (ref 13–17)
IMM GRANULOCYTES # BLD AUTO: 0 K/MCL (ref 0–0.2)
IMM GRANULOCYTES # BLD: 1 %
LYMPHOCYTES # BLD: 1 K/MCL (ref 1–4)
LYMPHOCYTES NFR BLD: 23 %
MCH RBC QN AUTO: 30.8 PG (ref 26–34)
MCHC RBC AUTO-ENTMCNC: 33.5 G/DL (ref 32–36.5)
MCV RBC AUTO: 91.7 FL (ref 78–100)
MONOCYTES # BLD: 0.5 K/MCL (ref 0.3–0.9)
MONOCYTES NFR BLD: 11 %
NEUTROPHILS # BLD: 2.8 K/MCL (ref 1.8–7.7)
NEUTROPHILS NFR BLD: 63 %
NRBC BLD MANUAL-RTO: 0 /100 WBC
PLATELET # BLD AUTO: 194 K/MCL (ref 140–450)
RBC # BLD: 5.33 MIL/MCL (ref 4.5–5.9)
WBC # BLD: 4.4 K/MCL (ref 4.2–11)

## 2023-04-17 PROCEDURE — 36415 COLL VENOUS BLD VENIPUNCTURE: CPT | Performed by: INTERNAL MEDICINE

## 2023-04-17 PROCEDURE — 84153 ASSAY OF PSA TOTAL: CPT | Performed by: INTERNAL MEDICINE

## 2023-04-17 PROCEDURE — 80053 COMPREHEN METABOLIC PANEL: CPT | Performed by: INTERNAL MEDICINE

## 2023-04-17 PROCEDURE — 99396 PREV VISIT EST AGE 40-64: CPT | Performed by: PHYSICIAN ASSISTANT

## 2023-04-17 PROCEDURE — 85025 COMPLETE CBC W/AUTO DIFF WBC: CPT | Performed by: INTERNAL MEDICINE

## 2023-04-17 PROCEDURE — 80061 LIPID PANEL: CPT | Performed by: INTERNAL MEDICINE

## 2023-04-17 RX ORDER — ZOLPIDEM TARTRATE 10 MG/1
10 TABLET ORAL NIGHTLY
Qty: 90 TABLET | Refills: 1 | Status: SHIPPED | OUTPATIENT
Start: 2023-04-17

## 2023-04-17 RX ORDER — TADALAFIL 20 MG/1
TABLET ORAL
Qty: 30 TABLET | Refills: 11 | Status: SHIPPED | OUTPATIENT
Start: 2023-04-17

## 2023-04-17 RX ORDER — SULINDAC 200 MG/1
200 TABLET ORAL 2 TIMES DAILY
Qty: 180 TABLET | Refills: 1 | Status: SHIPPED | OUTPATIENT
Start: 2023-04-17

## 2023-04-17 ASSESSMENT — ENCOUNTER SYMPTOMS
BACK PAIN: 1
FATIGUE: 0
DIARRHEA: 0
COUGH: 0
SHORTNESS OF BREATH: 0
CHILLS: 0
NUMBNESS: 0
VOMITING: 0
FEVER: 0
NAUSEA: 0
DIAPHORESIS: 0
WHEEZING: 0

## 2023-04-17 ASSESSMENT — PATIENT HEALTH QUESTIONNAIRE - PHQ9
1. LITTLE INTEREST OR PLEASURE IN DOING THINGS: NOT AT ALL
SUM OF ALL RESPONSES TO PHQ9 QUESTIONS 1 AND 2: 0
2. FEELING DOWN, DEPRESSED OR HOPELESS: NOT AT ALL
SUM OF ALL RESPONSES TO PHQ9 QUESTIONS 1 AND 2: 0
CLINICAL INTERPRETATION OF PHQ2 SCORE: NO FURTHER SCREENING NEEDED

## 2023-04-18 LAB
ALBUMIN SERPL-MCNC: 3.7 G/DL (ref 3.6–5.1)
ALBUMIN/GLOB SERPL: 1.3 {RATIO} (ref 1–2.4)
ALP SERPL-CCNC: 91 UNITS/L (ref 45–117)
ALT SERPL-CCNC: 29 UNITS/L
ANION GAP SERPL CALC-SCNC: 9 MMOL/L (ref 7–19)
AST SERPL-CCNC: 18 UNITS/L
BILIRUB SERPL-MCNC: 0.9 MG/DL (ref 0.2–1)
BUN SERPL-MCNC: 15 MG/DL (ref 6–20)
BUN/CREAT SERPL: 16 (ref 7–25)
CALCIUM SERPL-MCNC: 9.2 MG/DL (ref 8.4–10.2)
CHLORIDE SERPL-SCNC: 107 MMOL/L (ref 97–110)
CHOLEST SERPL-MCNC: 166 MG/DL
CHOLEST/HDLC SERPL: 3.5 {RATIO}
CO2 SERPL-SCNC: 28 MMOL/L (ref 21–32)
CREAT SERPL-MCNC: 0.92 MG/DL (ref 0.67–1.17)
FASTING DURATION TIME PATIENT: 12 HOURS (ref 0–999)
GFR SERPLBLD BASED ON 1.73 SQ M-ARVRAT: >90 ML/MIN
GLOBULIN SER-MCNC: 2.8 G/DL (ref 2–4)
GLUCOSE SERPL-MCNC: 101 MG/DL (ref 70–99)
HDLC SERPL-MCNC: 47 MG/DL
LDLC SERPL CALC-MCNC: 101 MG/DL
NONHDLC SERPL-MCNC: 119 MG/DL
POTASSIUM SERPL-SCNC: 4.6 MMOL/L (ref 3.4–5.1)
PROT SERPL-MCNC: 6.5 G/DL (ref 6.4–8.2)
PSA SERPL-MCNC: 3.89 NG/ML
SODIUM SERPL-SCNC: 139 MMOL/L (ref 135–145)
TRIGL SERPL-MCNC: 89 MG/DL

## 2023-04-19 LAB — NONINV COLON CA DNA+OCC BLD SCRN STL QL: NORMAL

## 2023-04-28 LAB — NONINV COLON CA DNA+OCC BLD SCRN STL QL: NEGATIVE

## 2023-05-19 ENCOUNTER — PATIENT MESSAGE (OUTPATIENT)
Dept: FAMILY MEDICINE CLINIC | Age: 58
End: 2023-05-19

## 2023-05-19 ENCOUNTER — OFFICE VISIT (OUTPATIENT)
Dept: FAMILY MEDICINE CLINIC | Age: 58
End: 2023-05-19
Payer: COMMERCIAL

## 2023-05-19 VITALS
WEIGHT: 193.6 LBS | BODY MASS INDEX: 27.72 KG/M2 | HEART RATE: 106 BPM | SYSTOLIC BLOOD PRESSURE: 134 MMHG | HEIGHT: 70 IN | DIASTOLIC BLOOD PRESSURE: 98 MMHG | TEMPERATURE: 98.1 F | OXYGEN SATURATION: 98 %

## 2023-05-19 DIAGNOSIS — M1A.0720 IDIOPATHIC CHRONIC GOUT OF LEFT FOOT WITHOUT TOPHUS: ICD-10-CM

## 2023-05-19 DIAGNOSIS — I10 ESSENTIAL (PRIMARY) HYPERTENSION: ICD-10-CM

## 2023-05-19 DIAGNOSIS — Z12.5 SCREENING FOR PROSTATE CANCER: ICD-10-CM

## 2023-05-19 DIAGNOSIS — E11.65 TYPE 2 DIABETES MELLITUS WITH HYPERGLYCEMIA, WITHOUT LONG-TERM CURRENT USE OF INSULIN: Primary | ICD-10-CM

## 2023-05-19 DIAGNOSIS — E78.2 MIXED HYPERLIPIDEMIA: ICD-10-CM

## 2023-05-19 DIAGNOSIS — I10 ESSENTIAL HYPERTENSION: ICD-10-CM

## 2023-05-19 PROCEDURE — 99214 OFFICE O/P EST MOD 30 MIN: CPT | Performed by: FAMILY MEDICINE

## 2023-05-19 RX ORDER — GLIPIZIDE 5 MG/1
5 TABLET ORAL
Qty: 180 TABLET | Refills: 3 | Status: SHIPPED | OUTPATIENT
Start: 2023-05-19

## 2023-05-19 RX ORDER — LOSARTAN POTASSIUM 100 MG/1
100 TABLET ORAL DAILY
Qty: 90 TABLET | Refills: 3 | Status: SHIPPED | OUTPATIENT
Start: 2023-05-19

## 2023-05-19 RX ORDER — METFORMIN HYDROCHLORIDE 500 MG/1
1000 TABLET, EXTENDED RELEASE ORAL
Qty: 180 TABLET | Refills: 3 | Status: SHIPPED | OUTPATIENT
Start: 2023-05-19

## 2023-05-19 RX ORDER — ALLOPURINOL 100 MG/1
200 TABLET ORAL DAILY
Qty: 180 TABLET | Refills: 3 | Status: SHIPPED | OUTPATIENT
Start: 2023-05-19

## 2023-05-19 RX ORDER — PRAVASTATIN SODIUM 20 MG
20 TABLET ORAL NIGHTLY
Qty: 90 TABLET | Refills: 3 | Status: SHIPPED | OUTPATIENT
Start: 2023-05-19

## 2023-05-19 NOTE — PROGRESS NOTES
Man Gill presents to [facility].    Chief Complaint:  Diabetes, blood pressure, cholesterol    Subjective       History of Present Illness:  Gera is in today for follow-up on his care.  He has type 2 diabetes for which he currently takes metformin and glipizide.  He states his blood sugar typically runs between 130-150.  He denies any symptomatic hypoglycemia.  He is due for recheck.  His A1c was 7.1% in late February.     He also has hypertension, elevated cholesterol, and gout for which he remains on treatments as noted below.  He is without any acute concern related to these issues.  He does check his blood pressure at home and states it typically runs in a better range, roughly 120/80.    Review of Systems:  Review of Systems   Constitutional: Negative for chills and fever.   Respiratory: Negative for cough and shortness of breath.    Cardiovascular: Negative for chest pain and palpitations.   Gastrointestinal: Negative for abdominal pain, nausea and vomiting.      Objective   Medical History:  Past Medical History:   • Essential hypertension   • Gout   • Kidney stones   • Mixed hyperlipidemia   • Type 2 diabetes mellitus     Past Surgical History:   • COLONOSCOPY    Normal      Family History   Problem Relation Age of Onset   • Hypertension Mother    • Coronary artery disease Father    • Hypertension Father    • Coronary artery disease Brother      Social History     Tobacco Use   • Smoking status: Former     Packs/day: 1.00     Years: 20.00     Pack years: 20.00     Types: Cigarettes     Quit date: 1996     Years since quittin.3   • Smokeless tobacco: Never   Substance Use Topics   • Alcohol use: Not Currently       Health Maintenance Due   Topic Date Due   • Hepatitis B (1 of 3 - 3-dose series) Never done   • COVID-19 Vaccine (1) Never done   • Pneumococcal Vaccine 0-64 (1 - PCV) Never done   • ZOSTER VACCINE (1 of 2) Never done   • DIABETIC EYE EXAM  Never done  "  • TDAP/TD VACCINES (3 - Td or Tdap) 08/10/2022   • URINE MICROALBUMIN  05/17/2023        Immunization History   Administered Date(s) Administered   • Td 07/09/2004   • Tdap 08/10/2012       No Known Allergies     Medications:  Current Outpatient Medications on File Prior to Visit   Medication Sig   • Accu-Chek FastClix Lancets misc Check blood sugar once daily for E 11.9   • albuterol sulfate  (90 Base) MCG/ACT inhaler Inhale 2 puffs Every 4 (Four) Hours As Needed for Wheezing or Shortness of Air (cough).   • Aspirin Buf,CaCarb-MgCarb-MgO, 81 MG tablet Take 81 mg by mouth 3 (Three) Times a Week.   • glucose blood (Accu-Chek Guide) test strip Check blood sugar once daily for E 11.9   • [DISCONTINUED] allopurinol (ZYLOPRIM) 100 MG tablet TAKE TWO TABLETS BY MOUTH EVERY DAY   • [DISCONTINUED] glipizide (GLUCOTROL) 5 MG tablet TAKE ONE TABLET BY MOUTH TWICE DAILY BEFORE meals   • [DISCONTINUED] losartan (COZAAR) 100 MG tablet TAKE ONE TABLET BY MOUTH EVERY DAY   • [DISCONTINUED] metFORMIN ER (GLUCOPHAGE-XR) 500 MG 24 hr tablet Take 2 tablets by mouth Daily With Dinner.   • [DISCONTINUED] pravastatin (PRAVACHOL) 20 MG tablet Take 1 tablet by mouth Every Night.     No current facility-administered medications on file prior to visit.       Vital Signs:   Vitals:    05/19/23 1543 05/19/23 1631   BP: 138/97 134/98   BP Location: Right arm Right arm   Patient Position: Sitting Sitting   Cuff Size:  Adult   Pulse: 112  Comment: finger clip 106   Temp: 98.1 °F (36.7 °C)    TempSrc: Oral    SpO2:  98%   Weight: 87.8 kg (193 lb 9.6 oz)    Height: 177.8 cm (70\")    Body mass index is 27.78 kg/m².    Physical Exam:  Physical Exam  Vitals reviewed.   Constitutional:       General: He is not in acute distress.     Appearance: He is not ill-appearing.   Eyes:      Pupils: Pupils are equal, round, and reactive to light.   Neck:      Comments: No thyromegaly  Cardiovascular:      Rate and Rhythm: Normal rate and regular rhythm. "   Pulmonary:      Effort: Pulmonary effort is normal.      Breath sounds: Normal breath sounds.   Abdominal:      General: There is no distension.      Palpations: Abdomen is soft.      Tenderness: There is no abdominal tenderness.   Musculoskeletal:      Cervical back: Neck supple.   Lymphadenopathy:      Cervical: No cervical adenopathy.   Skin:     Findings: No lesion or rash.   Neurological:      Mental Status: He is alert.         Result Review      The following data was reviewed by Vadim Mishra MD on 05/19/2023.  Lab Results   Component Value Date    WBC 8.33 12/06/2022    HGB 13.8 12/06/2022    HCT 42.7 12/06/2022    MCV 85.9 12/06/2022     12/06/2022     Lab Results   Component Value Date    GLUCOSE 94 12/06/2022    BUN 17 12/06/2022    CREATININE 1.04 12/06/2022     12/06/2022    K 3.8 12/06/2022     12/06/2022    CO2 23.6 12/06/2022    CALCIUM 9.4 12/06/2022    PROTEINTOT 6.3 12/06/2022    ALBUMIN 4.70 12/06/2022    ALT 30 02/27/2023    AST 23 12/06/2022    ALKPHOS 76 12/06/2022    BILITOT 0.3 12/06/2022    EGFR 82.7 12/06/2022    GLOB 1.6 12/06/2022    AGRATIO 2.9 12/06/2022    BCR 16.3 12/06/2022    ANIONGAP 11.4 12/06/2022      Lab Results   Component Value Date    CHOL 172 02/27/2023    CHLPL 151 04/03/2021    TRIG 248 (H) 02/27/2023    HDL 35 (L) 02/27/2023    LDL 95 02/27/2023     Lab Results   Component Value Date    TSH 2.350 11/21/2022     Lab Results   Component Value Date    HGBA1C 7.10 (H) 02/27/2023     Lab Results   Component Value Date    PSA 0.444 05/17/2022    PSA 0.51 04/03/2021    PSA 0.50 01/14/2020            Assessment and Plan:   Today, we have reviewed his care.  Gera's blood pressure is somewhat elevated as noted.  He says that it runs in a good range at home, and we will ask him to stop in with his blood pressure cuff for comparison.  Regarding his diabetes, his medications will be refilled and labs updated in a few weeks as noted.  Otherwise, we will  plan to see him back in about 6 months for recheck.       Diagnoses and all orders for this visit:    1. Type 2 diabetes mellitus with hyperglycemia, without long-term current use of insulin (Primary)  -     glipizide (GLUCOTROL) 5 MG tablet; Take 1 tablet by mouth 2 (Two) Times a Day Before Meals.  Dispense: 180 tablet; Refill: 3  -     metFORMIN ER (GLUCOPHAGE-XR) 500 MG 24 hr tablet; Take 2 tablets by mouth Daily With Dinner.  Dispense: 180 tablet; Refill: 3  -     pravastatin (PRAVACHOL) 20 MG tablet; Take 1 tablet by mouth Every Night.  Dispense: 90 tablet; Refill: 3  -     Comprehensive Metabolic Panel; Future  -     Hemoglobin A1c; Future  -     MicroAlbumin, Urine, Random - Urine, Clean Catch; Future    2. Mixed hyperlipidemia  -     pravastatin (PRAVACHOL) 20 MG tablet; Take 1 tablet by mouth Every Night.  Dispense: 90 tablet; Refill: 3  -     Comprehensive Metabolic Panel; Future    3. Essential hypertension  -     Comprehensive Metabolic Panel; Future    4. Idiopathic chronic gout of left foot without tophus  -     allopurinol (ZYLOPRIM) 100 MG tablet; Take 2 tablets by mouth Daily.  Dispense: 180 tablet; Refill: 3  -     Uric acid; Future    5. Essential (primary) hypertension  -     losartan (COZAAR) 100 MG tablet; Take 1 tablet by mouth Daily.  Dispense: 90 tablet; Refill: 3    6. Screening for prostate cancer  -     PSA Screen; Future    Follow Up   Return in about 6 months (around 11/19/2023) for Recheck.  Patient was given instructions and counseling regarding his condition or for health maintenance advice. Please see specific information pulled into the AVS if appropriate.

## 2023-05-31 ENCOUNTER — OFFICE VISIT (OUTPATIENT)
Dept: FAMILY MEDICINE CLINIC | Age: 58
End: 2023-05-31

## 2023-05-31 VITALS
OXYGEN SATURATION: 97 % | BODY MASS INDEX: 27.63 KG/M2 | SYSTOLIC BLOOD PRESSURE: 152 MMHG | HEART RATE: 67 BPM | WEIGHT: 193 LBS | DIASTOLIC BLOOD PRESSURE: 87 MMHG | TEMPERATURE: 98.1 F | HEIGHT: 70 IN

## 2023-05-31 DIAGNOSIS — R05.1 ACUTE COUGH: ICD-10-CM

## 2023-05-31 DIAGNOSIS — R07.9 INTERMITTENT CHEST PAIN: Primary | ICD-10-CM

## 2023-05-31 DIAGNOSIS — I10 ESSENTIAL HYPERTENSION: ICD-10-CM

## 2023-05-31 PROBLEM — N13.2 URETERAL STONE WITH HYDRONEPHROSIS: Status: ACTIVE | Noted: 2017-04-13

## 2023-05-31 PROBLEM — K21.9 ACID REFLUX: Status: ACTIVE | Noted: 2023-05-31

## 2023-05-31 PROBLEM — U07.1 COVID-19: Status: ACTIVE | Noted: 2023-05-31

## 2023-05-31 PROBLEM — R07.89 ATYPICAL CHEST PAIN: Status: ACTIVE | Noted: 2023-05-31

## 2023-05-31 PROCEDURE — 87428 SARSCOV & INF VIR A&B AG IA: CPT

## 2023-05-31 PROCEDURE — 93000 ELECTROCARDIOGRAM COMPLETE: CPT

## 2023-05-31 PROCEDURE — 99213 OFFICE O/P EST LOW 20 MIN: CPT

## 2023-05-31 NOTE — PROGRESS NOTES
Subjective     CHIEF COMPLAINT    Chief Complaint   Patient presents with   • Chest Pain     Left arm pain last night and this morning. Cough x 2 days, belching a lot, does make him feel better.      History of Present Illness  Patient is a 57-year-old male who presents to the clinic today with complaints of intermittent chest pain.  He reports yesterday he was cleaning his pool when he had some chest pain.  He sat down and the pain went away.  Last night he had some chest pain after eating.  He does state the pain went to his left arm a bit.  Denies any personal history of cardiac issues.  He does have a family history of cardiac issues.  Denies any active chest pain today.  He is a non-smoker.  He does also have a mild cough today and runny nose.  He mentioned as well that last night when he belched his chest pain improved.  Describes the pain as a burning sensation. His wife is present with him and states that he is also had some increased anxiety recently.  His blood pressure is mildly elevated in office today.  He reports he does check it at home and sometimes it is around the same    Review of Systems   Constitutional: Negative for chills and fever.   HENT: Positive for rhinorrhea.    Respiratory: Positive for cough, chest tightness and shortness of breath. Negative for wheezing.    Cardiovascular: Positive for chest pain.   Gastrointestinal: Negative for nausea and vomiting.   Neurological: Negative for weakness and numbness.     No Known Allergies    Current Outpatient Medications on File Prior to Visit   Medication Sig Dispense Refill   • Accu-Chek FastClix Lancets misc Check blood sugar once daily for E 11.9 100 each 3   • albuterol sulfate  (90 Base) MCG/ACT inhaler Inhale 2 puffs Every 4 (Four) Hours As Needed for Wheezing or Shortness of Air (cough). 18 g 2   • allopurinol (ZYLOPRIM) 100 MG tablet Take 2 tablets by mouth Daily. 180 tablet 3   • Aspirin Buf,CaCarb-MgCarb-MgO, 81 MG tablet Take 81  "mg by mouth 3 (Three) Times a Week.     • glipizide (GLUCOTROL) 5 MG tablet Take 1 tablet by mouth 2 (Two) Times a Day Before Meals. 180 tablet 3   • glucose blood (Accu-Chek Guide) test strip Check blood sugar once daily for E 11.9 100 each 3   • losartan (COZAAR) 100 MG tablet Take 1 tablet by mouth Daily. 90 tablet 3   • metFORMIN ER (GLUCOPHAGE-XR) 500 MG 24 hr tablet Take 2 tablets by mouth Daily With Dinner. 180 tablet 3   • pravastatin (PRAVACHOL) 20 MG tablet Take 1 tablet by mouth Every Night. 90 tablet 3     No current facility-administered medications on file prior to visit.     /87 (BP Location: Left arm, Patient Position: Sitting, Cuff Size: Adult)   Pulse 67   Temp 98.1 °F (36.7 °C) (Oral)   Ht 177.8 cm (70\")   Wt 87.5 kg (193 lb)   SpO2 97%   BMI 27.69 kg/m²     Objective     Physical Exam  Vitals and nursing note reviewed.   Constitutional:       General: He is not in acute distress.     Appearance: Normal appearance. He is not ill-appearing.   HENT:      Head: Normocephalic and atraumatic.      Right Ear: Tympanic membrane, ear canal and external ear normal.      Left Ear: Tympanic membrane, ear canal and external ear normal.      Nose: Nose normal.      Mouth/Throat:      Lips: Pink.      Mouth: Mucous membranes are moist.      Pharynx: No posterior oropharyngeal erythema.   Eyes:      Extraocular Movements: Extraocular movements intact.      Pupils: Pupils are equal, round, and reactive to light.   Cardiovascular:      Rate and Rhythm: Normal rate and regular rhythm.      Heart sounds: Normal heart sounds. No murmur heard.  Pulmonary:      Effort: Pulmonary effort is normal. No accessory muscle usage or respiratory distress.      Breath sounds: Normal breath sounds. No wheezing or rhonchi.   Musculoskeletal:      Cervical back: Normal range of motion.   Lymphadenopathy:      Cervical: No cervical adenopathy.   Skin:     General: Skin is warm and dry.   Neurological:      General: No " focal deficit present.      Mental Status: He is alert and oriented to person, place, and time.      Sensory: Sensation is intact.      Gait: Gait is intact.   Psychiatric:         Mood and Affect: Mood and affect normal.     Procedures       Lab Results (last 24 hours)     Procedure Component Value Units Date/Time    POCT SARS-CoV-2 Antigen ARTURO + Flu [[id number]] Collected: 05/31/23 1133    Specimen: Swab Updated: 05/31/23 1133     SARS Antigen Not Detected     Influenza A Antigen ARTURO Not Detected     Influenza B Antigen ARTURO Not Detected     Internal Control Passed     Lot Number [id number]     Expiration Date 12/20/23             Diagnoses and all orders for this visit:    1. Intermittent chest pain (Primary)  -     ECG 12 Lead  -     Ambulatory Referral to Cardiology    2. Acute cough  -     POCT SARS-CoV-2 Antigen ARTURO + Flu    3. Essential hypertension  Comments:  Recommend ambulatory monitoring, will forward chart to PCP for review.      Patient is negative for COVID and flu on rapid testing today.  Suspect his cough may be related to allergies or viral in nature.  No evidence of bacterial infection at this time.    EKG completed in office today due to intermittent chest pain.  Patient does not have active chest pain at this time.  EKG and case was reviewed with on-call provider, Dr. Hilario.  We will send in an urgent referral to cardiology for further evaluation.  Strict ER precautions were given to patient to proceed to ER if he experiences any more chest pain. Patient voiced understanding of all instructions and had no further questions at this time.        Follow up:  Return if symptoms worsen or fail to improve.  Patient was given instructions and counseling regarding his condition or for health maintenance advice. Please see specific information pulled into the AVS if appropriate.

## 2023-05-31 NOTE — PROGRESS NOTES
Procedure     ECG 12 Lead    Date/Time: 5/31/2023 4:35 PM  Performed by: Paula Hilario MD  Authorized by: Mariza Ruvalcaba APRN   Comparison: not compared with previous ECG   Rhythm: sinus rhythm  Rate: normal  BPM: 63  Conduction: conduction normal  ST Segments: ST segments normal  T Waves: T waves normal  T inversion: III  T flattening: aVF  QRS axis: normal  Other: no other findings    Clinical impression: normal ECG  Comments: Normal EKG.  BZ

## 2023-05-31 NOTE — Clinical Note
FYI- patient seen for acute visit. BP slightly elevated, he is monitoring at home and it will be elevated at home at times.

## 2023-06-01 ENCOUNTER — HOSPITAL ENCOUNTER (INPATIENT)
Facility: HOSPITAL | Age: 58
LOS: 2 days | Discharge: HOME | DRG: 247 | End: 2023-06-03
Attending: EMERGENCY MEDICINE | Admitting: INTERNAL MEDICINE
Payer: COMMERCIAL

## 2023-06-01 ENCOUNTER — APPOINTMENT (OUTPATIENT)
Dept: GENERAL RADIOLOGY | Facility: HOSPITAL | Age: 58
DRG: 247 | End: 2023-06-01
Payer: COMMERCIAL

## 2023-06-01 DIAGNOSIS — I21.11 ACUTE ST ELEVATION MYOCARDIAL INFARCTION (STEMI) INVOLVING RIGHT CORONARY ARTERY: Primary | ICD-10-CM

## 2023-06-01 LAB
ACT BLD: 257 SECONDS (ref 89–137)
ACT BLD: 317 SECONDS (ref 89–137)
ALBUMIN SERPL-MCNC: 4.6 G/DL (ref 3.5–5.2)
ALBUMIN/GLOB SERPL: 1.7 G/DL
ALP SERPL-CCNC: 85 U/L (ref 39–117)
ALT SERPL W P-5'-P-CCNC: 42 U/L (ref 1–41)
ANION GAP SERPL CALCULATED.3IONS-SCNC: 13.1 MMOL/L (ref 5–15)
AST SERPL-CCNC: 33 U/L (ref 1–40)
BASOPHILS # BLD AUTO: 0.03 10*3/MM3 (ref 0–0.2)
BASOPHILS NFR BLD AUTO: 0.3 % (ref 0–1.5)
BILIRUB SERPL-MCNC: 0.5 MG/DL (ref 0–1.2)
BUN SERPL-MCNC: 13 MG/DL (ref 6–20)
BUN/CREAT SERPL: 12 (ref 7–25)
CALCIUM SPEC-SCNC: 11.5 MG/DL (ref 8.6–10.5)
CHLORIDE SERPL-SCNC: 99 MMOL/L (ref 98–107)
CO2 SERPL-SCNC: 24.9 MMOL/L (ref 22–29)
CREAT SERPL-MCNC: 1.08 MG/DL (ref 0.76–1.27)
DEPRECATED RDW RBC AUTO: 37 FL (ref 37–54)
EGFRCR SERPLBLD CKD-EPI 2021: 79.1 ML/MIN/1.73
EOSINOPHIL # BLD AUTO: 0.03 10*3/MM3 (ref 0–0.4)
EOSINOPHIL NFR BLD AUTO: 0.3 % (ref 0.3–6.2)
ERYTHROCYTE [DISTWIDTH] IN BLOOD BY AUTOMATED COUNT: 12.3 % (ref 12.3–15.4)
GEN 5 2HR TROPONIN T REFLEX: 4939 NG/L
GLOBULIN UR ELPH-MCNC: 2.7 GM/DL
GLUCOSE BLDC GLUCOMTR-MCNC: 126 MG/DL (ref 70–99)
GLUCOSE BLDC GLUCOMTR-MCNC: 159 MG/DL (ref 70–99)
GLUCOSE BLDC GLUCOMTR-MCNC: 182 MG/DL (ref 70–99)
GLUCOSE SERPL-MCNC: 201 MG/DL (ref 65–99)
HBA1C MFR BLD: 7.3 % (ref 4.8–5.6)
HCT VFR BLD AUTO: 45.7 % (ref 37.5–51)
HGB BLD-MCNC: 15.9 G/DL (ref 13–17.7)
HOLD SPECIMEN: NORMAL
HOLD SPECIMEN: NORMAL
IMM GRANULOCYTES # BLD AUTO: 0.03 10*3/MM3 (ref 0–0.05)
IMM GRANULOCYTES NFR BLD AUTO: 0.3 % (ref 0–0.5)
LIPASE SERPL-CCNC: 26 U/L (ref 13–60)
LYMPHOCYTES # BLD AUTO: 1.28 10*3/MM3 (ref 0.7–3.1)
LYMPHOCYTES NFR BLD AUTO: 12.6 % (ref 19.6–45.3)
MAGNESIUM SERPL-MCNC: 1.5 MG/DL (ref 1.6–2.6)
MCH RBC QN AUTO: 29.1 PG (ref 26.6–33)
MCHC RBC AUTO-ENTMCNC: 34.8 G/DL (ref 31.5–35.7)
MCV RBC AUTO: 83.7 FL (ref 79–97)
MONOCYTES # BLD AUTO: 0.39 10*3/MM3 (ref 0.1–0.9)
MONOCYTES NFR BLD AUTO: 3.9 % (ref 5–12)
NEUTROPHILS NFR BLD AUTO: 8.36 10*3/MM3 (ref 1.7–7)
NEUTROPHILS NFR BLD AUTO: 82.6 % (ref 42.7–76)
NRBC BLD AUTO-RTO: 0 /100 WBC (ref 0–0.2)
NT-PROBNP SERPL-MCNC: 495 PG/ML (ref 0–900)
PLATELET # BLD AUTO: 221 10*3/MM3 (ref 140–450)
PMV BLD AUTO: 11.2 FL (ref 6–12)
POTASSIUM SERPL-SCNC: 4.1 MMOL/L (ref 3.5–5.2)
PROT SERPL-MCNC: 7.3 G/DL (ref 6–8.5)
QT INTERVAL: 364 MS
QT INTERVAL: 389 MS
RBC # BLD AUTO: 5.46 10*6/MM3 (ref 4.14–5.8)
SODIUM SERPL-SCNC: 137 MMOL/L (ref 136–145)
TROPONIN T DELTA: 4862 NG/L
TROPONIN T SERPL HS-MCNC: 77 NG/L
WBC NRBC COR # BLD: 10.12 10*3/MM3 (ref 3.4–10.8)
WHOLE BLOOD HOLD COAG: NORMAL
WHOLE BLOOD HOLD SPECIMEN: NORMAL

## 2023-06-01 PROCEDURE — 25010000002 HEPARIN (PORCINE) PER 1000 UNITS: Performed by: INTERNAL MEDICINE

## 2023-06-01 PROCEDURE — C1725 CATH, TRANSLUMIN NON-LASER: HCPCS | Performed by: INTERNAL MEDICINE

## 2023-06-01 PROCEDURE — 92978 ENDOLUMINL IVUS OCT C 1ST: CPT | Performed by: INTERNAL MEDICINE

## 2023-06-01 PROCEDURE — 25010000002 CANGRELOR TETRASODIUM 50 MG RECONSTITUTED SOLUTION 1 EACH VIAL: Performed by: INTERNAL MEDICINE

## 2023-06-01 PROCEDURE — 25510000001 IOPAMIDOL PER 1 ML: Performed by: INTERNAL MEDICINE

## 2023-06-01 PROCEDURE — 82948 REAGENT STRIP/BLOOD GLUCOSE: CPT

## 2023-06-01 PROCEDURE — C1894 INTRO/SHEATH, NON-LASER: HCPCS | Performed by: INTERNAL MEDICINE

## 2023-06-01 PROCEDURE — 25010000002 HEPARIN (PORCINE) PER 1000 UNITS: Performed by: EMERGENCY MEDICINE

## 2023-06-01 PROCEDURE — 99222 1ST HOSP IP/OBS MODERATE 55: CPT | Performed by: STUDENT IN AN ORGANIZED HEALTH CARE EDUCATION/TRAINING PROGRAM

## 2023-06-01 PROCEDURE — 93010 ELECTROCARDIOGRAM REPORT: CPT | Performed by: INTERNAL MEDICINE

## 2023-06-01 PROCEDURE — C1769 GUIDE WIRE: HCPCS | Performed by: INTERNAL MEDICINE

## 2023-06-01 PROCEDURE — 93458 L HRT ARTERY/VENTRICLE ANGIO: CPT | Performed by: INTERNAL MEDICINE

## 2023-06-01 PROCEDURE — C1887 CATHETER, GUIDING: HCPCS | Performed by: INTERNAL MEDICINE

## 2023-06-01 PROCEDURE — 83690 ASSAY OF LIPASE: CPT | Performed by: EMERGENCY MEDICINE

## 2023-06-01 PROCEDURE — 83036 HEMOGLOBIN GLYCOSYLATED A1C: CPT | Performed by: STUDENT IN AN ORGANIZED HEALTH CARE EDUCATION/TRAINING PROGRAM

## 2023-06-01 PROCEDURE — 80053 COMPREHEN METABOLIC PANEL: CPT | Performed by: EMERGENCY MEDICINE

## 2023-06-01 PROCEDURE — C1874 STENT, COATED/COV W/DEL SYS: HCPCS | Performed by: INTERNAL MEDICINE

## 2023-06-01 PROCEDURE — C1753 CATH, INTRAVAS ULTRASOUND: HCPCS | Performed by: INTERNAL MEDICINE

## 2023-06-01 PROCEDURE — C1760 CLOSURE DEV, VASC: HCPCS | Performed by: INTERNAL MEDICINE

## 2023-06-01 PROCEDURE — 25010000002 FENTANYL CITRATE (PF) 50 MCG/ML SOLUTION: Performed by: INTERNAL MEDICINE

## 2023-06-01 PROCEDURE — 71045 X-RAY EXAM CHEST 1 VIEW: CPT

## 2023-06-01 PROCEDURE — 83735 ASSAY OF MAGNESIUM: CPT | Performed by: EMERGENCY MEDICINE

## 2023-06-01 PROCEDURE — 93005 ELECTROCARDIOGRAM TRACING: CPT | Performed by: INTERNAL MEDICINE

## 2023-06-01 PROCEDURE — 25010000002 MIDAZOLAM PER 1MG: Performed by: INTERNAL MEDICINE

## 2023-06-01 PROCEDURE — 99152 MOD SED SAME PHYS/QHP 5/>YRS: CPT | Performed by: INTERNAL MEDICINE

## 2023-06-01 PROCEDURE — 92941 PRQ TRLML REVSC TOT OCCL AMI: CPT | Performed by: INTERNAL MEDICINE

## 2023-06-01 PROCEDURE — 25010000002 ONDANSETRON PER 1 MG: Performed by: EMERGENCY MEDICINE

## 2023-06-01 PROCEDURE — 85025 COMPLETE CBC W/AUTO DIFF WBC: CPT | Performed by: EMERGENCY MEDICINE

## 2023-06-01 PROCEDURE — C9606 PERC D-E COR REVASC W AMI S: HCPCS | Performed by: INTERNAL MEDICINE

## 2023-06-01 PROCEDURE — 99284 EMERGENCY DEPT VISIT MOD MDM: CPT

## 2023-06-01 PROCEDURE — 25010000002 MORPHINE PER 10 MG: Performed by: EMERGENCY MEDICINE

## 2023-06-01 PROCEDURE — 99223 1ST HOSP IP/OBS HIGH 75: CPT | Performed by: INTERNAL MEDICINE

## 2023-06-01 PROCEDURE — 99153 MOD SED SAME PHYS/QHP EA: CPT | Performed by: INTERNAL MEDICINE

## 2023-06-01 PROCEDURE — 85347 COAGULATION TIME ACTIVATED: CPT

## 2023-06-01 PROCEDURE — C9460 INJECTION, CANGRELOR: HCPCS | Performed by: INTERNAL MEDICINE

## 2023-06-01 PROCEDURE — 83880 ASSAY OF NATRIURETIC PEPTIDE: CPT | Performed by: EMERGENCY MEDICINE

## 2023-06-01 PROCEDURE — 84484 ASSAY OF TROPONIN QUANT: CPT | Performed by: INTERNAL MEDICINE

## 2023-06-01 PROCEDURE — 63710000001 INSULIN LISPRO (HUMAN) PER 5 UNITS: Performed by: STUDENT IN AN ORGANIZED HEALTH CARE EDUCATION/TRAINING PROGRAM

## 2023-06-01 PROCEDURE — 25010000002 MAGNESIUM SULFATE 2 GM/50ML SOLUTION: Performed by: STUDENT IN AN ORGANIZED HEALTH CARE EDUCATION/TRAINING PROGRAM

## 2023-06-01 PROCEDURE — 84484 ASSAY OF TROPONIN QUANT: CPT | Performed by: EMERGENCY MEDICINE

## 2023-06-01 DEVICE — XIENCE SKYPOINT™ EVEROLIMUS ELUTING CORONARY STENT SYSTEM 3.50 MM X 12 MM / RAPID-EXCHANGE
Type: IMPLANTABLE DEVICE | Status: FUNCTIONAL
Brand: XIENCE SKYPOINT™

## 2023-06-01 DEVICE — XIENCE SKYPOINT™ EVEROLIMUS ELUTING CORONARY STENT SYSTEM 3.00 MM X 28 MM / RAPID-EXCHANGE
Type: IMPLANTABLE DEVICE | Status: FUNCTIONAL
Brand: XIENCE SKYPOINT™

## 2023-06-01 DEVICE — XIENCE SKYPOINT™ EVEROLIMUS ELUTING CORONARY STENT SYSTEM 3.50 MM X 15 MM / RAPID-EXCHANGE
Type: IMPLANTABLE DEVICE | Status: FUNCTIONAL
Brand: XIENCE SKYPOINT™

## 2023-06-01 RX ORDER — NITROGLYCERIN 0.4 MG/1
TABLET SUBLINGUAL
Status: COMPLETED | OUTPATIENT
Start: 2023-06-01 | End: 2023-06-01

## 2023-06-01 RX ORDER — ASPIRIN 81 MG/1
81 TABLET ORAL DAILY
Status: DISCONTINUED | OUTPATIENT
Start: 2023-06-02 | End: 2023-06-03 | Stop reason: HOSPADM

## 2023-06-01 RX ORDER — FENTANYL CITRATE 50 UG/ML
INJECTION, SOLUTION INTRAMUSCULAR; INTRAVENOUS
Status: DISCONTINUED | OUTPATIENT
Start: 2023-06-01 | End: 2023-06-01 | Stop reason: HOSPADM

## 2023-06-01 RX ORDER — NITROGLYCERIN 0.4 MG/1
0.4 TABLET SUBLINGUAL
Status: DISCONTINUED | OUTPATIENT
Start: 2023-06-01 | End: 2023-06-03 | Stop reason: HOSPADM

## 2023-06-01 RX ORDER — MIDAZOLAM HYDROCHLORIDE 2 MG/2ML
INJECTION, SOLUTION INTRAMUSCULAR; INTRAVENOUS
Status: DISCONTINUED | OUTPATIENT
Start: 2023-06-01 | End: 2023-06-01 | Stop reason: HOSPADM

## 2023-06-01 RX ORDER — HEPARIN SODIUM 1000 [USP'U]/ML
INJECTION, SOLUTION INTRAVENOUS; SUBCUTANEOUS
Status: DISCONTINUED | OUTPATIENT
Start: 2023-06-01 | End: 2023-06-01 | Stop reason: HOSPADM

## 2023-06-01 RX ORDER — MORPHINE SULFATE 4 MG/ML
INJECTION, SOLUTION INTRAMUSCULAR; INTRAVENOUS
Status: COMPLETED | OUTPATIENT
Start: 2023-06-01 | End: 2023-06-01

## 2023-06-01 RX ORDER — LIDOCAINE HYDROCHLORIDE 20 MG/ML
INJECTION, SOLUTION INFILTRATION; PERINEURAL
Status: DISCONTINUED | OUTPATIENT
Start: 2023-06-01 | End: 2023-06-01 | Stop reason: HOSPADM

## 2023-06-01 RX ORDER — SODIUM CHLORIDE 0.9 % (FLUSH) 0.9 %
10 SYRINGE (ML) INJECTION AS NEEDED
Status: DISCONTINUED | OUTPATIENT
Start: 2023-06-01 | End: 2023-06-03 | Stop reason: HOSPADM

## 2023-06-01 RX ORDER — ROSUVASTATIN CALCIUM 20 MG/1
20 TABLET, COATED ORAL NIGHTLY
Status: DISCONTINUED | OUTPATIENT
Start: 2023-06-01 | End: 2023-06-03 | Stop reason: HOSPADM

## 2023-06-01 RX ORDER — DEXTROSE MONOHYDRATE 25 G/50ML
25 INJECTION, SOLUTION INTRAVENOUS
Status: DISCONTINUED | OUTPATIENT
Start: 2023-06-01 | End: 2023-06-03 | Stop reason: HOSPADM

## 2023-06-01 RX ORDER — ACETAMINOPHEN 325 MG/1
650 TABLET ORAL EVERY 4 HOURS PRN
Status: DISCONTINUED | OUTPATIENT
Start: 2023-06-01 | End: 2023-06-03 | Stop reason: HOSPADM

## 2023-06-01 RX ORDER — ASPIRIN 81 MG/1
324 TABLET, CHEWABLE ORAL ONCE
Status: COMPLETED | OUTPATIENT
Start: 2023-06-01 | End: 2023-06-01

## 2023-06-01 RX ORDER — MAGNESIUM SULFATE HEPTAHYDRATE 40 MG/ML
2 INJECTION, SOLUTION INTRAVENOUS ONCE
Status: COMPLETED | OUTPATIENT
Start: 2023-06-01 | End: 2023-06-01

## 2023-06-01 RX ORDER — ASPIRIN 81 MG/1
81 TABLET ORAL DAILY
COMMUNITY

## 2023-06-01 RX ORDER — DEXTROSE MONOHYDRATE 25 G/50ML
25 INJECTION, SOLUTION INTRAVENOUS
Status: DISCONTINUED | OUTPATIENT
Start: 2023-06-01 | End: 2023-06-01 | Stop reason: SDUPTHER

## 2023-06-01 RX ORDER — HEPARIN SODIUM 5000 [USP'U]/ML
INJECTION, SOLUTION INTRAVENOUS; SUBCUTANEOUS
Status: COMPLETED | OUTPATIENT
Start: 2023-06-01 | End: 2023-06-01

## 2023-06-01 RX ORDER — PHENYLEPHRINE HCL IN 0.9% NACL 1 MG/10 ML
SYRINGE (ML) INTRAVENOUS
Status: DISCONTINUED | OUTPATIENT
Start: 2023-06-01 | End: 2023-06-01 | Stop reason: HOSPADM

## 2023-06-01 RX ORDER — NICOTINE POLACRILEX 4 MG
15 LOZENGE BUCCAL
Status: DISCONTINUED | OUTPATIENT
Start: 2023-06-01 | End: 2023-06-03 | Stop reason: HOSPADM

## 2023-06-01 RX ORDER — ONDANSETRON 2 MG/ML
4 INJECTION INTRAMUSCULAR; INTRAVENOUS EVERY 6 HOURS PRN
Status: DISCONTINUED | OUTPATIENT
Start: 2023-06-01 | End: 2023-06-03 | Stop reason: HOSPADM

## 2023-06-01 RX ORDER — INSULIN LISPRO 100 [IU]/ML
2-9 INJECTION, SOLUTION INTRAVENOUS; SUBCUTANEOUS
Status: DISCONTINUED | OUTPATIENT
Start: 2023-06-01 | End: 2023-06-03 | Stop reason: HOSPADM

## 2023-06-01 RX ORDER — ONDANSETRON 2 MG/ML
INJECTION INTRAMUSCULAR; INTRAVENOUS
Status: COMPLETED | OUTPATIENT
Start: 2023-06-01 | End: 2023-06-01

## 2023-06-01 RX ORDER — SODIUM CHLORIDE 9 MG/ML
100 INJECTION, SOLUTION INTRAVENOUS CONTINUOUS
Status: ACTIVE | OUTPATIENT
Start: 2023-06-01 | End: 2023-06-01

## 2023-06-01 RX ORDER — ONDANSETRON 2 MG/ML
4 INJECTION INTRAMUSCULAR; INTRAVENOUS ONCE
Status: DISCONTINUED | OUTPATIENT
Start: 2023-06-01 | End: 2023-06-03 | Stop reason: HOSPADM

## 2023-06-01 RX ORDER — NICOTINE POLACRILEX 4 MG
15 LOZENGE BUCCAL
Status: DISCONTINUED | OUTPATIENT
Start: 2023-06-01 | End: 2023-06-01 | Stop reason: SDUPTHER

## 2023-06-01 RX ORDER — ONDANSETRON 4 MG/1
4 TABLET, FILM COATED ORAL EVERY 6 HOURS PRN
Status: DISCONTINUED | OUTPATIENT
Start: 2023-06-01 | End: 2023-06-03 | Stop reason: HOSPADM

## 2023-06-01 RX ORDER — ALLOPURINOL 100 MG/1
200 TABLET ORAL DAILY
Status: DISCONTINUED | OUTPATIENT
Start: 2023-06-01 | End: 2023-06-03 | Stop reason: HOSPADM

## 2023-06-01 RX ADMIN — NITROGLYCERIN 0.4 MG: 0.4 TABLET SUBLINGUAL at 06:43

## 2023-06-01 RX ADMIN — ASPIRIN 81 MG CHEWABLE TABLET 162 MG: 81 TABLET CHEWABLE at 06:41

## 2023-06-01 RX ADMIN — TICAGRELOR 90 MG: 90 TABLET ORAL at 21:59

## 2023-06-01 RX ADMIN — MORPHINE SULFATE 4 MG: 4 INJECTION, SOLUTION INTRAMUSCULAR; INTRAVENOUS at 06:56

## 2023-06-01 RX ADMIN — NITROGLYCERIN 0.4 MG: 0.4 TABLET SUBLINGUAL at 06:52

## 2023-06-01 RX ADMIN — SODIUM CHLORIDE 100 ML/HR: 9 INJECTION, SOLUTION INTRAVENOUS at 11:53

## 2023-06-01 RX ADMIN — ONDANSETRON 4 MG: 2 INJECTION INTRAMUSCULAR; INTRAVENOUS at 07:01

## 2023-06-01 RX ADMIN — HEPARIN SODIUM 5000 UNITS: 5000 INJECTION, SOLUTION INTRAVENOUS; SUBCUTANEOUS at 06:47

## 2023-06-01 RX ADMIN — ROSUVASTATIN 20 MG: 20 TABLET, FILM COATED ORAL at 21:59

## 2023-06-01 RX ADMIN — INSULIN LISPRO 2 UNITS: 100 INJECTION, SOLUTION INTRAVENOUS; SUBCUTANEOUS at 13:33

## 2023-06-01 RX ADMIN — SODIUM CHLORIDE 100 ML/HR: 9 INJECTION, SOLUTION INTRAVENOUS at 09:50

## 2023-06-01 RX ADMIN — MAGNESIUM SULFATE HEPTAHYDRATE 2 G: 40 INJECTION, SOLUTION INTRAVENOUS at 11:44

## 2023-06-01 NOTE — Clinical Note
First balloon inflation max pressure = 12 alon. First balloon inflation duration = 10 seconds. Second inflation of balloon - Max pressure = 18 alon. 2nd Inflation of balloon - Duration = 10 seconds.

## 2023-06-01 NOTE — Clinical Note
First balloon inflation max pressure = 16 alon. First balloon inflation duration = 10 seconds. Second inflation of balloon - Max pressure = 8 alon. 2nd Inflation of balloon - Duration = 15 seconds. Third inflation of balloon - Max pressure = 6 alon. 3rd Inflation of balloon - Duration = 10 seconds. Fourth inflation of balloon - Max pressure = 8 alon. 4th Inflation of balloon - Duration = 15 seconds.

## 2023-06-01 NOTE — PAYOR COMM NOTE
"PENDED CASE    Genny RICHARDSON.  [facility] ,[facility]  Ph [phone]  F [phone]        Man Gill \"Gera\" (57 y.o. Male)     Date of Birth   1965    Social Security Number       Address   [address] [place] [address]    Home Phone   [phone]    MRN   [id number]       Orthodoxy   None    Marital Status                               Admission Date   6/1/23    Admission Type   Emergency    Admitting Provider       Attending Provider       Department, Room/Bed   [facility] CATH LAB, Pool/CATH       Discharge Date       Discharge Disposition       Discharge Destination                               Attending Provider: (none)   Allergies: No Known Allergies    Isolation: None   Infection: None   Code Status: Not on file    Ht: 180.3 cm (71\")   Wt: 85.4 kg (188 lb 4.4 oz)    Admission Cmt: None   Principal Problem: Acute ST elevation myocardial infarction (STEMI) involving right coronary artery [I21.11]                 Active Insurance as of 6/1/2023     Primary Coverage     Payor Plan Insurance Group Employer/Plan Group    [employer] BLUE [employer]OSS [employer] BLUE [employer]OSS BLUE [employer] PPO [id number]     Payor Plan Address Payor Plan Phone Number Payor Plan Fax Number Effective Dates    [address] [phone]  1/1/2022 - None Entered    [place] [address]       Subscriber Name Subscriber Birth Date Member ID       MAN GILL 1965 [id number]                 Emergency Contacts      (Rel.) Home Phone Work Phone Mobile Phone    SYD GILL (Spouse) -- -- [phone]            History & Physical    No notes of this type exist for this encounter.            Emergency Department Notes      Reddy Balbuena DO at 06/01/23 0639          Time: 6:39 AM EDT  Date of encounter:  6/1/2023  Independent Historian/Clinical History and Information was obtained by:   Patient  Chief Complaint: Chest pain    History is limited by: N/A    History of Present " Illness:  Patient is a 57 y.o. year old male who presents to the emergency department for evaluation of chest pain.  Patient has recently been having episodes of chest pain especially the last several days.  Patient has history of hypertension which he says has been running high.  Patient has strong family history with several brothers and father having heart attacks at young ages.  Patient has no prior history of heart issues.  Patient states he saw his primary care doctor yesterday and had a work-up and nothing was found abnormal.  The plan was for the patient to have a cardiology referral.  Patient was not having chest pain yesterday.  Until last night around 7:00.  Patient states he got short of breath with the pain and get severely intense around 1 AM.  Currently he rates the pain a 7 out of 10.  Patient describes a tight feeling in the midsternal area.    HPI    Patient Care Team  Primary Care Provider: Vadim Mishra MD    Past Medical History:     No Known Allergies  Past Medical History:   Diagnosis Date   • Essential hypertension    • Gout    • Kidney stones    • Mixed hyperlipidemia    • Type 2 diabetes mellitus      Past Surgical History:   Procedure Laterality Date   • COLONOSCOPY  11/15/2013    Normal     Family History   Problem Relation Age of Onset   • Hypertension Mother    • Coronary artery disease Father    • Hypertension Father    • Coronary artery disease Brother        Home Medications:  Prior to Admission medications    Medication Sig Start Date End Date Taking? Authorizing Provider   Accu-Chek FastClix Lancets misc Check blood sugar once daily for E 11.9 8/18/22   Vadim Mishra MD   albuterol sulfate  (90 Base) MCG/ACT inhaler Inhale 2 puffs Every 4 (Four) Hours As Needed for Wheezing or Shortness of Air (cough). 12/21/22   Ban Chou APRN   allopurinol (ZYLOPRIM) 100 MG tablet Take 2 tablets by mouth Daily. 5/19/23   Vadim Mishra MD  "  Aspirin Buf,CaCarb-MgCarb-MgO, 81 MG tablet Take 81 mg by mouth 3 (Three) Times a Week.    Provider, MD Esther   glipizide (GLUCOTROL) 5 MG tablet Take 1 tablet by mouth 2 (Two) Times a Day Before Meals. 23   Vadim Mishra MD   glucose blood (Accu-Chek Guide) test strip Check blood sugar once daily for E 11.9 22   Vadim Mishra MD   losartan (COZAAR) 100 MG tablet Take 1 tablet by mouth Daily. 23   Vadim Mishra MD   metFORMIN ER (GLUCOPHAGE-XR) 500 MG 24 hr tablet Take 2 tablets by mouth Daily With Dinner. 23   Vadim Mishra MD   pravastatin (PRAVACHOL) 20 MG tablet Take 1 tablet by mouth Every Night. 23   Vadim Mishra MD        Social History:   Social History     Tobacco Use   • Smoking status: Former     Packs/day: 1.00     Years: 20.00     Pack years: 20.00     Types: Cigarettes     Quit date: 1996     Years since quittin.4   • Smokeless tobacco: Never   Substance Use Topics   • Alcohol use: Not Currently   • Drug use: Never         Review of Systems:  Review of Systems   Respiratory: Positive for shortness of breath.    Cardiovascular: Positive for chest pain.        Physical Exam:  BP (!) 186/98   Pulse 70   Temp 98.1 °F (36.7 °C)   Resp 15   Ht 180.3 cm (71\")   Wt 85.4 kg (188 lb 4.4 oz)   SpO2 100%   BMI 26.26 kg/m²     Physical Exam  Vitals and nursing note reviewed.   Constitutional:       Appearance: He is well-developed.   HENT:      Head: Normocephalic and atraumatic.   Cardiovascular:      Rate and Rhythm: Normal rate and regular rhythm.      Heart sounds: Normal heart sounds.   Pulmonary:      Effort: Pulmonary effort is normal.      Breath sounds: Normal breath sounds.   Abdominal:      Palpations: Abdomen is soft.   Musculoskeletal:         General: Normal range of motion.   Skin:     General: Skin is warm and dry.   Neurological:      General: No focal deficit present.      Mental Status: He is alert " and oriented to person, place, and time.   Psychiatric:         Mood and Affect: Mood normal.                 Procedures:  Procedures      Medical Decision Making:      Comorbidities that affect care:    Diabetes, Hypertension    External Notes reviewed:    Previous Clinic Note: Patient was seen yesterday by his primary care provider, referral to cardiology was being made.      The following orders were placed and all results were independently analyzed by me:  Orders Placed This Encounter   Procedures   • XR Chest 1 View   • Semora Draw   • High Sensitivity Troponin T   • Comprehensive Metabolic Panel   • Lipase   • BNP   • Magnesium   • CBC Auto Differential   • NPO Diet NPO Type: Strict NPO   • Undress & Gown   • Continuous Pulse Oximetry   • Oxygen Therapy- Nasal Cannula; Titrate 1-6 LPM Per SpO2; 90 - 95%   • ECG 12 Lead ED Triage Standing Order; Chest Pain   • ECG 12 Lead ED Triage Standing Order; Chest Pain   • Insert Peripheral IV   • CBC & Differential   • Green Top (Gel)   • Lavender Top   • Gold Top - SST   • Light Blue Top       Medications Given in the Emergency Department:  Medications   sodium chloride 0.9 % flush 10 mL (has no administration in time range)   nitroglycerin (NITROSTAT) SL tablet 0.4 mg (0.4 mg Sublingual Given 6/1/23 0643)   heparin (porcine) 5000 UNIT/ML injection (5,000 Units Intravenous Given 6/1/23 0647)   aspirin chewable tablet 324 mg (162 mg Oral Given 6/1/23 0641)        ED Course:    ED Course as of 06/01/23 0741   Thu Jun 01, 2023   0631 EKG:    Rhythm: Normal sinus rhythm  Rate: 69  Intervals: Normal  T-wave: Normal  ST Segment: Acute ST elevations in 2, 3 and aVF    EKG Comparison: No prior available    Interpreted by me   [NL]      ED Course User Index  [NL] Reddy Balbuena DO       Labs:    Lab Results (last 24 hours)     Procedure Component Value Units Date/Time    POCT SARS-CoV-2 Antigen ARTURO + Flu [[id number]] Collected: 05/31/23 1133    Specimen: Swab Updated: 05/31/23  1133     SARS Antigen Not Detected     Influenza A Antigen ARTURO Not Detected     Influenza B Antigen ARTURO Not Detected     Internal Control Passed     Lot Number [id number]     Expiration Date 12/20/23           Imaging:    No Radiology Exams Resulted Within Past 24 Hours      Differential Diagnosis and Discussion:    Chest Pain:  Based on the patient's signs and symptoms, I considered aortic dissection, myocardial infaction, pulmonary embolism, cardiac tamponade, pericarditis, pneumothorax, musculoskeletal chest pain and other differential diagnosis as an etiology of the patient's chest pain.     All labs were reviewed and interpreted by me.  All X-rays impressions were independently interpreted by me.  EKG was interpreted by me.    MDM   57-year-old male patient arrived having chest pain.  His initial EKG shows evidence for an acute ST elevation MI involving the inferior leads.  She reported his pain is 7 out of 10.  STEMI alert was activated and Dr. Harrison will take the patient to the OR.  Patient had already taken this his statin medication this morning.  He also had taken 2 baby aspirin's and 2 additional baby aspirin's were given in the ED.  The patient was given the heparin protocol and sublingual nitro were ordered to help relieve his chest pain.            Critical Care Note: Total Critical Care time of 35 minutes. Total critical care time documented does not include time spent on separately billed procedures for services of nurses or physician assistants. I personally saw and examined the patient. I have reviewed all diagnostic interpretations and treatment plans as written. I was present for the key portions of any procedures performed and the inclusive time noted in any critical care statement. Critical care time includes patient management by me, time spent at the patients bedside,  time to review lab and imaging results, discussing patient care, documentation in the medical record, and time spent with  family or caregiver.    Patient Care Considerations:          Consultants/Shared Management Plan:  Patient discussed with Dr. Harrison, conventional cardiology, who will take the patient to the Cath Lab.    Social Determinants of Health:    Patient is independent, reliable, and has access to care.       Disposition and Care Coordination:  Sent to Cath Lab and admission per cardiology          Final diagnoses:   Acute ST elevation myocardial infarction (STEMI) involving right coronary artery        ED Disposition     ED Disposition   Send to Cath Lab    Condition   --    Comment   --             This medical record created using voice recognition software.           Reddy Balbuena DO  06/01/23 0749      Electronically signed by Reddy Balbuena DO at 06/01/23 0749     Amanda Hutchinson, RN at 06/01/23 0656        Pt had pravastatin at home just PTA, per Dr. Balbuena, holding atorvastatin at this time.    Electronically signed by Amanda Hutchinson, RN at 06/01/23 0701        Myocardial Infarction [facility]G Inpatient Care by Genny Lara RN       Indications Met: Reviewed on 6/1/2023 by Genny Lara RN       Created Using Review Status Review Entered   [facility] Case Management Primary Completed 6/1/2023 0811       Created By   Genny Lara RN       Criteria Set Name - Subset   Myocardial Infarction RRG Inpatient Care      Criteria Review   Myocardial Infarction RRG Inpatient Care     Overall Determination: Indications Met     Criteria:  [×] Admission is indicated for  1 or more  of the following :      [×] Acute myocardial infarction (MI) [G] [H] (not in context of cardiac procedure within last 48 hours), as indicated by  ALL  of the following :          [×] Elevated cardiac troponin level, [I] [J] [K] [L] as indicated by  1 or more  of the following :              [×] New or presumed new elevation of cardiac troponin level (ie, elevation clearly not due to chronic condition)              [×] Initial troponin elevated with  subsequent increase or decrease in level of 20% or more (ie, indicative of acute myocardial injury)          [×] Myocardial injury due to acute ischemia, as indicated by  1 or more  of the following :              [×] Symptoms consistent with myocardial ischemia (eg, chest pain, dyspnea)                  6/1/2023  8:11 AM                      -- 6/1/2023  8:11 AM by Genny Lara, RN --                          TO ED WITH C/P-HAS HTN IN WHICH PT SAYS HAS BEEN RUNNING HIGH LAST FEW DAYS-STRONG FAMILY HX OF MI IN FATHER/BROTHERS.NO PRIOR HX HEART ISSUES.SAW MD YESTERDAY.WAS PLANNING TO HAVE CARDIAC EVAL.DENIED PROBLEMS UNTIL LAST NIGHT.ASLO SOB,     Notes:  -- 6/1/2023  8:11 AM by Genny Lara, RN --      186/98 70 15 98.1 SATS 100%            EKG ST SEGMENT ELAVATIONS IN 2,3 AND aVF.STEMI EVOLVINGIN INFERIOR LEADS.STATIN.BABY ASA -HEP PROTOCOL.TAKEN TO CATH LAB AND GIVEN NITRO SL            STANDING MI ORDERS

## 2023-06-01 NOTE — Clinical Note
First balloon inflation max pressure = 12 alon. First balloon inflation duration = 10 seconds. Second inflation of balloon - Max pressure = 16 alon. 2nd Inflation of balloon - Duration = 10 seconds. Third inflation of balloon - Max pressure = 20 alon. 3rd Inflation of balloon - Duration = 10 seconds. Fourth inflation of balloon - Max pressure = 20 alon. 4th Inflation of balloon - Duration = 10 seconds.

## 2023-06-01 NOTE — H&P
[facility]   CARDIOLOGY HISTORY AND PHYSICAL    Patient Name: Man Gill  : 1965  MRN: [id number]  Primary Care Physician:  Vadim Mishra MD  Date of admission: 2023    Subjective   Subjective     Chief Complaint: Chest pain    HPI:    Man Gill is a 57 y.o. male with history of diabetes, hypertension and hyperlipidemia.  He has a very strong family history of heart disease in both brother who passed away at age 40 and father.  He presented apparently with 2 days of chest pain.  When he came to the emergency department he had inferior ST elevation.  He was brought directly to the Cath Lab.  He was found to have an occluded mid RCA that was heavily calcified.    Review of Systems   All systems were reviewed and negative except for: Chest pain    Personal History     Past Medical History:   Diagnosis Date   • Essential hypertension    • Gout    • Kidney stones    • Mixed hyperlipidemia    • Type 2 diabetes mellitus        Past medical history reviewed  Family History: family history includes Coronary artery disease in his brother and father; Hypertension in his father and mother. Otherwise pertinent FHx was reviewed and not pertinent to current issue.    Social History:  reports that he quit smoking about 27 years ago. His smoking use included cigarettes. He has a 20.00 pack-year smoking history. He has never used smokeless tobacco. He reports that he does not currently use alcohol. He reports that he does not use drugs.    Home Medications:  albuterol sulfate HFA, allopurinol, aspirin, glipizide, losartan, metFORMIN ER, and pravastatin      Allergies:  No Known Allergies    Objective   Objective     Vitals:   Temp:  [98.1 °F (36.7 °C)] 98.1 °F (36.7 °C)  Heart Rate:  [67-80] 79  Resp:  [15-19] 19  BP: (122-186)/() 122/81  Flow (L/min):  [98] 98  Physical Exam    Constitutional: Awake, alert   Eyes: PERRLA, sclerae anicteric, no conjunctival injection   HENT: NCAT, mucous  membranes moist   Neck: Supple, no thyromegaly, no lymphadenopathy, trachea midline   Respiratory: Clear to auscultation bilaterally, nonlabored respirations    Cardiovascular: RRR, no murmurs, rubs, or gallops, palpable pedal pulses bilaterally   Gastrointestinal: Positive bowel sounds, soft, nontender, nondistended   Musculoskeletal: No bilateral ankle edema, no clubbing or cyanosis to extremities   Psychiatric: Appropriate affect, cooperative   Neurologic: Oriented x 3, strength symmetric in all extremities, Cranial Nerves grossly intact to confrontation, speech clear   Skin: No rashes     Result Review    Result Review:  I have personally reviewed the results from the time of this admission to 6/1/2023 09:26 EDT and agree with these findings:  [x]  Laboratory  []  Microbiology  [x]  Radiology  [x]  EKG/Telemetry   [x]  Cardiology/Vascular   []  Pathology  [x]  Old records  []  Other:  Most notable findings include: Inferior ST elevation MI on EKG    Assessment & Plan   Assessment / Plan     Brief Patient Summary:  Man Gill is a 57 y.o. male who has a history of diabetes, hypertension and hyperlipidemia and very strong family history of coronary artery disease who presented with chest pain and was found to have an inferior ST elevation MI.  He had 3 overlapping drug-eluting stents to a occluded RCA that was severely calcified.    Active Hospital Problems:  Active Hospital Problems    Diagnosis    • **Acute ST elevation myocardial infarction (STEMI) involving right coronary artery        Assessment:  1.  Inferior ST elevation MI  2.  Hypertension  3.  Hyperlipidemia    Plan:   1.  Aspirin and Brilinta  2.  We will get an echocardiogram tomorrow morning  3.  Change his pravastatin to Crestor  4.  Patient was hypotensive in the Cath Lab.  I will hold his losartan.  If we can add back a little bit of beta-blocker and some losartan we will depending on how his blood pressure does.  5.  Patient quit smoking  more than 20 years ago.    DVT prophylaxis:  No DVT prophylaxis order currently exists.    CODE STATUS:       Admission Status:  I believe this patient meets inpatient status.    Electronically signed by Jin Harrison MD, 06/01/23, 9:26 AM EDT.

## 2023-06-01 NOTE — Clinical Note
First balloon inflation max pressure = 12 alon. First balloon inflation duration = 15 seconds. Second inflation of balloon - Max pressure = 16 alon. 2nd Inflation of balloon - Duration = 10 seconds.

## 2023-06-01 NOTE — PLAN OF CARE
Goal Outcome Evaluation:      Post cardiac cath this morning, 3 stents to RCA. VSS. Pulses 2+ in right lower extremities. Right femoral site clean dry intact. RA. Educated on important of medications.

## 2023-06-01 NOTE — Clinical Note
First balloon inflation max pressure = 15 alon. First balloon inflation duration = 22 seconds. Second inflation of balloon - Max pressure = 16 alon. 2nd Inflation of balloon - Duration = 27 seconds. Refilled,    Can be picked up at office

## 2023-06-01 NOTE — ED PROVIDER NOTES
Time: 6:39 AM EDT  Date of encounter:  6/1/2023  Independent Historian/Clinical History and Information was obtained by:   Patient  Chief Complaint: Chest pain    History is limited by: N/A    History of Present Illness:  Patient is a 57 y.o. year old male who presents to the emergency department for evaluation of chest pain.  Patient has recently been having episodes of chest pain especially the last several days.  Patient has history of hypertension which he says has been running high.  Patient has strong family history with several brothers and father having heart attacks at young ages.  Patient has no prior history of heart issues.  Patient states he saw his primary care doctor yesterday and had a work-up and nothing was found abnormal.  The plan was for the patient to have a cardiology referral.  Patient was not having chest pain yesterday.  Until last night around 7:00.  Patient states he got short of breath with the pain and get severely intense around 1 AM.  Currently he rates the pain a 7 out of 10.  Patient describes a tight feeling in the midsternal area.    HPI    Patient Care Team  Primary Care Provider: Vadim Mishra MD    Past Medical History:     No Known Allergies  Past Medical History:   Diagnosis Date   • Essential hypertension    • Gout    • Kidney stones    • Mixed hyperlipidemia    • Type 2 diabetes mellitus      Past Surgical History:   Procedure Laterality Date   • COLONOSCOPY  11/15/2013    Normal     Family History   Problem Relation Age of Onset   • Hypertension Mother    • Coronary artery disease Father    • Hypertension Father    • Coronary artery disease Brother        Home Medications:  Prior to Admission medications    Medication Sig Start Date End Date Taking? Authorizing Provider   Accu-Chek FastClix Lancets misc Check blood sugar once daily for E 11.9 8/18/22   Vadim Mishra MD   albuterol sulfate  (90 Base) MCG/ACT inhaler Inhale 2 puffs Every 4 (Four)  "Hours As Needed for Wheezing or Shortness of Air (cough). 22   Ban Chou APRN   allopurinol (ZYLOPRIM) 100 MG tablet Take 2 tablets by mouth Daily. 23   Vadim Mishra MD   Aspirin Buf,CaCarb-MgCarb-MgO, 81 MG tablet Take 81 mg by mouth 3 (Three) Times a Week.    Provider, MD Esther   glipizide (GLUCOTROL) 5 MG tablet Take 1 tablet by mouth 2 (Two) Times a Day Before Meals. 23   Vadim Mishra MD   glucose blood (Accu-Chek Guide) test strip Check blood sugar once daily for E 11.9 22   Vadim Mishra MD   losartan (COZAAR) 100 MG tablet Take 1 tablet by mouth Daily. 23   Vadim Mishra MD   metFORMIN ER (GLUCOPHAGE-XR) 500 MG 24 hr tablet Take 2 tablets by mouth Daily With Dinner. 23   Vadim Mishra MD   pravastatin (PRAVACHOL) 20 MG tablet Take 1 tablet by mouth Every Night. 23   Vadim Mishra MD        Social History:   Social History     Tobacco Use   • Smoking status: Former     Packs/day: 1.00     Years: 20.00     Pack years: 20.00     Types: Cigarettes     Quit date: 1996     Years since quittin.4   • Smokeless tobacco: Never   Substance Use Topics   • Alcohol use: Not Currently   • Drug use: Never         Review of Systems:  Review of Systems   Respiratory: Positive for shortness of breath.    Cardiovascular: Positive for chest pain.        Physical Exam:  BP (!) 186/98   Pulse 70   Temp 98.1 °F (36.7 °C)   Resp 15   Ht 180.3 cm (71\")   Wt 85.4 kg (188 lb 4.4 oz)   SpO2 100%   BMI 26.26 kg/m²     Physical Exam  Vitals and nursing note reviewed.   Constitutional:       Appearance: He is well-developed.   HENT:      Head: Normocephalic and atraumatic.   Cardiovascular:      Rate and Rhythm: Normal rate and regular rhythm.      Heart sounds: Normal heart sounds.   Pulmonary:      Effort: Pulmonary effort is normal.      Breath sounds: Normal breath sounds.   Abdominal:      Palpations: " Abdomen is soft.   Musculoskeletal:         General: Normal range of motion.   Skin:     General: Skin is warm and dry.   Neurological:      General: No focal deficit present.      Mental Status: He is alert and oriented to person, place, and time.   Psychiatric:         Mood and Affect: Mood normal.                  Procedures:  Procedures      Medical Decision Making:      Comorbidities that affect care:    Diabetes, Hypertension    External Notes reviewed:    Previous Clinic Note: Patient was seen yesterday by his primary care provider, referral to cardiology was being made.      The following orders were placed and all results were independently analyzed by me:  Orders Placed This Encounter   Procedures   • XR Chest 1 View   • [place] Draw   • High Sensitivity Troponin T   • Comprehensive Metabolic Panel   • Lipase   • BNP   • Magnesium   • CBC Auto Differential   • NPO Diet NPO Type: Strict NPO   • Undress & Gown   • Continuous Pulse Oximetry   • Oxygen Therapy- Nasal Cannula; Titrate 1-6 LPM Per SpO2; 90 - 95%   • ECG 12 Lead ED Triage Standing Order; Chest Pain   • ECG 12 Lead ED Triage Standing Order; Chest Pain   • Insert Peripheral IV   • CBC & Differential   • Green Top (Gel)   • Lavender Top   • Gold Top - SST   • Light Blue Top       Medications Given in the Emergency Department:  Medications   sodium chloride 0.9 % flush 10 mL (has no administration in time range)   nitroglycerin (NITROSTAT) SL tablet 0.4 mg (0.4 mg Sublingual Given 6/1/23 0643)   heparin (porcine) 5000 UNIT/ML injection (5,000 Units Intravenous Given 6/1/23 0647)   aspirin chewable tablet 324 mg (162 mg Oral Given 6/1/23 0641)        ED Course:    ED Course as of 06/01/23 0741   Thu Jun 01, 2023   0631 EKG:    Rhythm: Normal sinus rhythm  Rate: 69  Intervals: Normal  T-wave: Normal  ST Segment: Acute ST elevations in 2, 3 and aVF    EKG Comparison: No prior available    Interpreted by me   [NL]      ED Course User Index  [NL] Sree  DO Reddy       Labs:    Lab Results (last 24 hours)     Procedure Component Value Units Date/Time    POCT SARS-CoV-2 Antigen ARTURO + Flu [[id number]] Collected: 05/31/23 1133    Specimen: Swab Updated: 05/31/23 1133     SARS Antigen Not Detected     Influenza A Antigen ARTURO Not Detected     Influenza B Antigen ARTURO Not Detected     Internal Control Passed     Lot Number [id number]     Expiration Date 12/20/23           Imaging:    No Radiology Exams Resulted Within Past 24 Hours      Differential Diagnosis and Discussion:    Chest Pain:  Based on the patient's signs and symptoms, I considered aortic dissection, myocardial infaction, pulmonary embolism, cardiac tamponade, pericarditis, pneumothorax, musculoskeletal chest pain and other differential diagnosis as an etiology of the patient's chest pain.     All labs were reviewed and interpreted by me.  All X-rays impressions were independently interpreted by me.  EKG was interpreted by me.    MDM   57-year-old male patient arrived having chest pain.  His initial EKG shows evidence for an acute ST elevation MI involving the inferior leads.  She reported his pain is 7 out of 10.  STEMI alert was activated and Dr. Harrison will take the patient to the OR.  Patient had already taken this his statin medication this morning.  He also had taken 2 baby aspirin's and 2 additional baby aspirin's were given in the ED.  The patient was given the heparin protocol and sublingual nitro were ordered to help relieve his chest pain.            Critical Care Note: Total Critical Care time of 35 minutes. Total critical care time documented does not include time spent on separately billed procedures for services of nurses or physician assistants. I personally saw and examined the patient. I have reviewed all diagnostic interpretations and treatment plans as written. I was present for the key portions of any procedures performed and the inclusive time noted in any critical care statement. Critical  care time includes patient management by me, time spent at the patients bedside,  time to review lab and imaging results, discussing patient care, documentation in the medical record, and time spent with family or caregiver.    Patient Care Considerations:          Consultants/Shared Management Plan:  Patient discussed with Dr. Harrison, conventional cardiology, who will take the patient to the Cath Lab.    Social Determinants of Health:    Patient is independent, reliable, and has access to care.       Disposition and Care Coordination:  Sent to Cath Lab and admission per cardiology          Final diagnoses:   Acute ST elevation myocardial infarction (STEMI) involving right coronary artery        ED Disposition     ED Disposition   Send to Cath Lab    Condition   --    Comment   --             This medical record created using voice recognition software.           Reddy Balbuena DO  06/01/23 0749

## 2023-06-01 NOTE — CONSULTS
Pulmonary / Critical Care Consult Note      Patient Name: Man Gill  : 1965  MRN: [id number]  Primary Care Physician:  Vadim Mishra MD  Referring Physician: Jin Harrison MD  Date of admission: 2023    Subjective   Subjective     Reason for Consult/ Chief Complaint:   Acute STEMI    HPI:  Man Gill is a 57 y.o. male presented to the ED for chest pain radiating to his arms. He has history of HTN, HLD. He was a previous tobacco user, more than 20 yrs ago. He was found to have inferior STEMI and was taken to the cath lab where he received PCI x3 to his RCA. Patient reports feeling much better after his stent placements. In the ICU he is HD stable, with SBP in the 140s. He denies chest pain at this time. He denies SOB, fevers, chills. No acute issues at this time.     Review of Systems  Constitutional symptoms:  Denied complaints   Ear, nose, throat: Denied complaints  Cardiovascular:  Denied complaints  Respiratory: Denied complaints  Gastrointestinal: Denied complaints  Musculoskeletal: Denied complaints  Genitourinary: Denied complaints  Allergy / Immunology: Denied complaints  Hematologic: Denied complaints  Neurologic: Denied complaints  Skin: Denied complaints  Endocrine: Denied complaints  Psychiatric: Denied complaints      Personal History     Past Medical History:   Diagnosis Date   • Essential hypertension    • Gout    • Kidney stones    • Mixed hyperlipidemia    • Type 2 diabetes mellitus        Past Surgical History:   Procedure Laterality Date   • CARDIAC CATHETERIZATION     • COLONOSCOPY  11/15/2013    Normal       Family History: family history includes Coronary artery disease in his brother and father; Hypertension in his father and mother. Otherwise pertinent FHx was reviewed and not pertinent to current issue.    Social History:  reports that he quit smoking about 27 years ago. His smoking use included cigarettes. He has a 20.00 pack-year smoking history.  He has never used smokeless tobacco. He reports that he does not currently use alcohol. He reports that he does not use drugs.    Home Medications:  albuterol sulfate HFA, allopurinol, aspirin, glipizide, losartan, metFORMIN ER, and pravastatin    Allergies:  No Known Allergies    Objective    Objective     Vitals:   Temp:  [97.6 °F (36.4 °C)-98.1 °F (36.7 °C)] 97.6 °F (36.4 °C)  Heart Rate:  [69-80] 69  Resp:  [15-21] 21  BP: (122-186)/() 136/72  Flow (L/min):  [98] 98    Physical Exam:  Vital Signs Reviewed   General:  WDWN, Alert, NAD.    HEENT:  PERRL, EOMI.  OP, nares clear  Neck:  Supple, no JVD, no thyromegaly  Chest:  good aeration, clear to auscultation bilaterally, tympanic to percussion bilaterally, no work of breathing noted on room air  CV: RRR, no MGR, pulses 2+, equal.  Abd:  Soft, NT, ND, + BS, no HSM  EXT:  no clubbing, no cyanosis, no edema  Neuro:  A&Ox3, CN grossly intact, no focal deficits.  Skin: No rashes or lesions noted      Result Review    Result Review:  I have personally reviewed the results from the time of this admission to 6/1/2023 12:03 EDT and agree with these findings:  []  Laboratory  []  Microbiology  []  Radiology  []  EKG/Telemetry   []  Cardiology/Vascular   []  Pathology  []  Old records  []  Other:  Most notable findings include:     Assessment & Plan   Assessment / Plan     Active Hospital Problems:  Active Hospital Problems    Diagnosis    • **Acute ST elevation myocardial infarction (STEMI) involving right coronary artery        Impression:  Acute inferior STEMI s/p PCI to RCA x3  Hypomagnesmia  Hx of HTN  Hx of HLD  Hx of tobacco use, >20 yrs ago; denies Vaping    Plan:  -HD stable s/p PCI to RCA, on room air  -Cont ASA, statin, Brillinta. Will likely need BB and ACE-I as BP is more stable; will defer to cards   -Replace electrolytes PRN to keep K 4.0, Mag 2.0, Phos 4.0.  -Keep glucose 140-180 while in ICU. Cont SSI.  -Transfuse to keep Hgb >7  -DVT ppx: HepSQ  -GI  ppx: None needed  -Lines: PIVs    DVT prophylaxis:  No DVT prophylaxis order currently exists.     There are no questions and answers to display.        Electronically signed by Lakshmi Yuan MD, 06/01/23, 12:03 PM EDT.

## 2023-06-02 ENCOUNTER — APPOINTMENT (OUTPATIENT)
Dept: CARDIOLOGY | Facility: HOSPITAL | Age: 58
DRG: 247 | End: 2023-06-02
Payer: COMMERCIAL

## 2023-06-02 LAB
ANION GAP SERPL CALCULATED.3IONS-SCNC: 11 MMOL/L (ref 5–15)
BH CV ECHO MEAS - AO ROOT DIAM: 3.9 CM
BH CV ECHO MEAS - EDV(CUBED): 81.2 ML
BH CV ECHO MEAS - EDV(MOD-SP2): 61.7 ML
BH CV ECHO MEAS - EDV(MOD-SP4): 43.8 ML
BH CV ECHO MEAS - EF(MOD-BP): 51.2 %
BH CV ECHO MEAS - EF(MOD-SP2): 50.2 %
BH CV ECHO MEAS - EF(MOD-SP4): 48.4 %
BH CV ECHO MEAS - ESV(CUBED): 27.8 ML
BH CV ECHO MEAS - ESV(MOD-SP2): 30.7 ML
BH CV ECHO MEAS - ESV(MOD-SP4): 22.6 ML
BH CV ECHO MEAS - FS: 30 %
BH CV ECHO MEAS - IVS/LVPW: 0.93 CM
BH CV ECHO MEAS - IVSD: 1.14 CM
BH CV ECHO MEAS - LA DIMENSION: 4.1 CM
BH CV ECHO MEAS - LAT PEAK E' VEL: 8.2 CM/SEC
BH CV ECHO MEAS - LV DIASTOLIC VOL/BSA (35-75): 21.4 CM2
BH CV ECHO MEAS - LV MASS(C)D: 183.3 GRAMS
BH CV ECHO MEAS - LV SYSTOLIC VOL/BSA (12-30): 11 CM2
BH CV ECHO MEAS - LVIDD: 4.3 CM
BH CV ECHO MEAS - LVIDS: 3 CM
BH CV ECHO MEAS - LVOT AREA: 3.1 CM2
BH CV ECHO MEAS - LVOT DIAM: 2 CM
BH CV ECHO MEAS - LVPWD: 1.23 CM
BH CV ECHO MEAS - MED PEAK E' VEL: 6.5 CM/SEC
BH CV ECHO MEAS - MV A MAX VEL: 89.5 CM/SEC
BH CV ECHO MEAS - MV DEC SLOPE: 356 CM/SEC2
BH CV ECHO MEAS - MV DEC TIME: 169 MSEC
BH CV ECHO MEAS - MV E MAX VEL: 60 CM/SEC
BH CV ECHO MEAS - MV E/A: 0.67
BH CV ECHO MEAS - RVDD: 2.8 CM
BH CV ECHO MEAS - SI(MOD-SP2): 15.1 ML/M2
BH CV ECHO MEAS - SI(MOD-SP4): 10.3 ML/M2
BH CV ECHO MEAS - SV(MOD-SP2): 31 ML
BH CV ECHO MEAS - SV(MOD-SP4): 21.2 ML
BH CV ECHO MEAS - TAPSE (>1.6): 2.03 CM
BH CV ECHO MEASUREMENTS AVERAGE E/E' RATIO: 8.16
BUN SERPL-MCNC: 11 MG/DL (ref 6–20)
BUN/CREAT SERPL: 11.7 (ref 7–25)
CALCIUM SPEC-SCNC: 8.8 MG/DL (ref 8.6–10.5)
CHLORIDE SERPL-SCNC: 102 MMOL/L (ref 98–107)
CO2 SERPL-SCNC: 23 MMOL/L (ref 22–29)
CREAT SERPL-MCNC: 0.94 MG/DL (ref 0.76–1.27)
DEPRECATED RDW RBC AUTO: 40.4 FL (ref 37–54)
EGFRCR SERPLBLD CKD-EPI 2021: 93.4 ML/MIN/1.73
ERYTHROCYTE [DISTWIDTH] IN BLOOD BY AUTOMATED COUNT: 12.7 % (ref 12.3–15.4)
GLUCOSE BLDC GLUCOMTR-MCNC: 102 MG/DL (ref 70–99)
GLUCOSE BLDC GLUCOMTR-MCNC: 141 MG/DL (ref 70–99)
GLUCOSE BLDC GLUCOMTR-MCNC: 161 MG/DL (ref 70–99)
GLUCOSE BLDC GLUCOMTR-MCNC: 166 MG/DL (ref 70–99)
GLUCOSE BLDC GLUCOMTR-MCNC: 196 MG/DL (ref 70–99)
GLUCOSE BLDC GLUCOMTR-MCNC: 261 MG/DL (ref 70–99)
GLUCOSE SERPL-MCNC: 150 MG/DL (ref 65–99)
HCT VFR BLD AUTO: 42.2 % (ref 37.5–51)
HGB BLD-MCNC: 14.1 G/DL (ref 13–17.7)
LEFT ATRIUM VOLUME INDEX: 13.3 ML/M2
MAGNESIUM SERPL-MCNC: 1.7 MG/DL (ref 1.6–2.6)
MAXIMAL PREDICTED HEART RATE: 161 BPM
MCH RBC QN AUTO: 29.3 PG (ref 26.6–33)
MCHC RBC AUTO-ENTMCNC: 33.4 G/DL (ref 31.5–35.7)
MCV RBC AUTO: 87.6 FL (ref 79–97)
PHOSPHATE SERPL-MCNC: 3.4 MG/DL (ref 2.5–4.5)
PLATELET # BLD AUTO: 189 10*3/MM3 (ref 140–450)
PMV BLD AUTO: 11.1 FL (ref 6–12)
POTASSIUM SERPL-SCNC: 4.2 MMOL/L (ref 3.5–5.2)
RBC # BLD AUTO: 4.82 10*6/MM3 (ref 4.14–5.8)
SODIUM SERPL-SCNC: 136 MMOL/L (ref 136–145)
STRESS TARGET HR: 137 BPM
WBC NRBC COR # BLD: 9.37 10*3/MM3 (ref 3.4–10.8)

## 2023-06-02 PROCEDURE — 63710000001 INSULIN LISPRO (HUMAN) PER 5 UNITS: Performed by: STUDENT IN AN ORGANIZED HEALTH CARE EDUCATION/TRAINING PROGRAM

## 2023-06-02 PROCEDURE — 93010 ELECTROCARDIOGRAM REPORT: CPT | Performed by: INTERNAL MEDICINE

## 2023-06-02 PROCEDURE — 84100 ASSAY OF PHOSPHORUS: CPT | Performed by: PHYSICIAN ASSISTANT

## 2023-06-02 PROCEDURE — 99232 SBSQ HOSP IP/OBS MODERATE 35: CPT | Performed by: INTERNAL MEDICINE

## 2023-06-02 PROCEDURE — B240ZZ3 ULTRASONOGRAPHY OF SINGLE CORONARY ARTERY, INTRAVASCULAR: ICD-10-PCS | Performed by: INTERNAL MEDICINE

## 2023-06-02 PROCEDURE — 027036Z DILATION OF CORONARY ARTERY, ONE ARTERY WITH THREE DRUG-ELUTING INTRALUMINAL DEVICES, PERCUTANEOUS APPROACH: ICD-10-PCS | Performed by: INTERNAL MEDICINE

## 2023-06-02 PROCEDURE — 85027 COMPLETE CBC AUTOMATED: CPT | Performed by: INTERNAL MEDICINE

## 2023-06-02 PROCEDURE — B2151ZZ FLUOROSCOPY OF LEFT HEART USING LOW OSMOLAR CONTRAST: ICD-10-PCS | Performed by: INTERNAL MEDICINE

## 2023-06-02 PROCEDURE — 82948 REAGENT STRIP/BLOOD GLUCOSE: CPT

## 2023-06-02 PROCEDURE — 93306 TTE W/DOPPLER COMPLETE: CPT

## 2023-06-02 PROCEDURE — 83735 ASSAY OF MAGNESIUM: CPT | Performed by: PHYSICIAN ASSISTANT

## 2023-06-02 PROCEDURE — 4A023N7 MEASUREMENT OF CARDIAC SAMPLING AND PRESSURE, LEFT HEART, PERCUTANEOUS APPROACH: ICD-10-PCS | Performed by: INTERNAL MEDICINE

## 2023-06-02 PROCEDURE — 93306 TTE W/DOPPLER COMPLETE: CPT | Performed by: INTERNAL MEDICINE

## 2023-06-02 PROCEDURE — 80048 BASIC METABOLIC PNL TOTAL CA: CPT | Performed by: INTERNAL MEDICINE

## 2023-06-02 PROCEDURE — B2111ZZ FLUOROSCOPY OF MULTIPLE CORONARY ARTERIES USING LOW OSMOLAR CONTRAST: ICD-10-PCS | Performed by: INTERNAL MEDICINE

## 2023-06-02 PROCEDURE — 93005 ELECTROCARDIOGRAM TRACING: CPT | Performed by: INTERNAL MEDICINE

## 2023-06-02 RX ORDER — LOSARTAN POTASSIUM 25 MG/1
25 TABLET ORAL
Status: DISCONTINUED | OUTPATIENT
Start: 2023-06-02 | End: 2023-06-03 | Stop reason: HOSPADM

## 2023-06-02 RX ADMIN — METOPROLOL TARTRATE 25 MG: 25 TABLET, FILM COATED ORAL at 10:15

## 2023-06-02 RX ADMIN — ROSUVASTATIN 20 MG: 20 TABLET, FILM COATED ORAL at 22:01

## 2023-06-02 RX ADMIN — METOPROLOL TARTRATE 25 MG: 25 TABLET, FILM COATED ORAL at 22:01

## 2023-06-02 RX ADMIN — INSULIN LISPRO 2 UNITS: 100 INJECTION, SOLUTION INTRAVENOUS; SUBCUTANEOUS at 11:55

## 2023-06-02 RX ADMIN — TICAGRELOR 90 MG: 90 TABLET ORAL at 22:01

## 2023-06-02 RX ADMIN — INSULIN LISPRO 6 UNITS: 100 INJECTION, SOLUTION INTRAVENOUS; SUBCUTANEOUS at 09:15

## 2023-06-02 RX ADMIN — TICAGRELOR 90 MG: 90 TABLET ORAL at 10:15

## 2023-06-02 RX ADMIN — ASPIRIN 81 MG: 81 TABLET, COATED ORAL at 10:14

## 2023-06-02 RX ADMIN — ALLOPURINOL 200 MG: 100 TABLET ORAL at 10:15

## 2023-06-02 RX ADMIN — LOSARTAN POTASSIUM 25 MG: 25 TABLET, FILM COATED ORAL at 10:14

## 2023-06-02 RX ADMIN — INSULIN LISPRO 2 UNITS: 100 INJECTION, SOLUTION INTRAVENOUS; SUBCUTANEOUS at 22:10

## 2023-06-02 NOTE — PLAN OF CARE
Goal Outcome Evaluation:              Outcome Evaluation: received pt as a transfer this shift. pt up ad orly. no complaints of pain or soa. right groin site dry and intact. pt anticipates dc tomorrow.

## 2023-06-02 NOTE — PROGRESS NOTES
[facility]     Cardiology Progress Note    Patient Name: Man Gill  : 1965  MRN: [id number]  Primary Care Physician:  Vadim Mishra MD  Date of admission: 2023    Subjective   Subjective   Chief complaint  Inferior STEMI    HPI:  Patient Reports he is overall doing well.  He notes no chest pain.    Review of Systems   All systems were reviewed and negative except for: Cardiac review of systems negative.    Objective   Objective     Vitals:   Temp:  [97.6 °F (36.4 °C)-98.6 °F (37 °C)] 98.6 °F (37 °C)  Heart Rate:  [] 66  Resp:  [21] 21  BP: (127-142)/(72-93) 129/75  Physical Exam   Neck: no JVD, no bruit  Lungs: clear to ausculation bilaterally.  No crackles or wheezes  CV: regular rate and rhythm, no murmur  Ext: no cyanosis, clubbing or edema.  Normal bilateral LE pulses.      Scheduled Meds:allopurinol, 200 mg, Oral, Daily  aspirin, 81 mg, Oral, Daily  insulin lispro, 2-9 Units, Subcutaneous, 4x Daily AC & at Bedtime  losartan, 25 mg, Oral, Q24H  metoprolol tartrate, 25 mg, Oral, Q12H  Morphine, 4 mg, Intravenous, Once  ondansetron, 4 mg, Intravenous, Once  rosuvastatin, 20 mg, Oral, Nightly  ticagrelor, 90 mg, Oral, BID      Continuous Infusions:        Result Review    Result Review:  I have personally reviewed the results from the time of this admission to 2023 09:05 EDT and agree with these findings:  [x]  Laboratory  []  Microbiology  [x]  Radiology  [x]  EKG/Telemetry   [x]  Cardiology/Vascular   []  Pathology  []  Old records  []  Other:  Most notable findings include:     CBC        2022    15:18 2023    06:44 2023    04:35   CBC   WBC 8.33   10.12   9.37     RBC 4.97   5.46   4.82     Hemoglobin 13.8   15.9   14.1     Hematocrit 42.7   45.7   42.2     MCV 85.9   83.7   87.6     MCH 27.8   29.1   29.3     MCHC 32.3   34.8   33.4     RDW 12.8   12.3   12.7     Platelets 211   221   189       CMP        2023    09:32 2023    06:44 2023     04:35   CMP   Glucose  201   150     BUN  13   11     Creatinine  1.08   0.94     EGFR  79.1   93.4     Sodium  137  C 136     Potassium  4.1  C 4.2     Chloride  99  C 102     Calcium  11.5   8.8     Total Protein  7.3      Albumin  4.6      Globulin  2.7      Total Bilirubin  0.5      Alkaline Phosphatase  85      AST (SGOT)  33      ALT (SGPT) 30   42      Albumin/Globulin Ratio  1.7      BUN/Creatinine Ratio  12.0   11.7     Anion Gap  13.1  C 11.0        C Corrected result      CARDIAC LABS:      Lab 06/01/23  1020 06/01/23  0644   PROBNP  --  495.0   HSTROP T 4,939* 77*        Assessment & Plan   Assessment / Plan     Brief Patient Summary:  Man Gill is a 57 y.o. male who has a history of diabetes, hypertension and hyperlipidemia and very strong family history of coronary artery disease who presented with chest pain and was found to have an inferior ST elevation MI.  He had 3 overlapping drug-eluting stents to a occluded RCA that was severely calcified.    Active Hospital Problems:  Active Hospital Problems    Diagnosis    • **Acute ST elevation myocardial infarction (STEMI) involving right coronary artery        Assessment:  1.  Inferior STEMI  2.  Very borderline significant stenosis in the proximal LAD.  3.  Diabetes  4.  Hypertension  5.  Hyperlipidemia    Plan:   1.  Continue the aspirin and Brilinta.  Told the patient he cannot miss any doses.  2.  Continue the Crestor  3.  We will add metoprolol 25 mg p.o. twice daily and put him back on a little of his losartan and at 25 mg p.o. daily.  He was on 100 mg p.o. daily at home.  If needed can slightly increase dose.  4.  We will get an echocardiogram on the patient.  5.  We will transfer to a telemetry bed.  6.  If patient is stable overnight I do think he can be discharged tomorrow a.m.  7.  When patient comes back to clinic we will discuss setting him up for IFR of the LAD.       CODE STATUS:        Electronically signed by Jin Harrison  MD, 06/02/23, 9:05 AM EDT.

## 2023-06-03 ENCOUNTER — READMISSION MANAGEMENT (OUTPATIENT)
Dept: CALL CENTER | Facility: HOSPITAL | Age: 58
End: 2023-06-03

## 2023-06-03 VITALS
WEIGHT: 192.9 LBS | HEART RATE: 88 BPM | DIASTOLIC BLOOD PRESSURE: 72 MMHG | HEIGHT: 70 IN | OXYGEN SATURATION: 97 % | BODY MASS INDEX: 27.62 KG/M2 | RESPIRATION RATE: 16 BRPM | TEMPERATURE: 98 F | SYSTOLIC BLOOD PRESSURE: 120 MMHG

## 2023-06-03 LAB
GLUCOSE BLDC GLUCOMTR-MCNC: 160 MG/DL (ref 70–99)
QT INTERVAL: 395 MS

## 2023-06-03 PROCEDURE — 63710000001 INSULIN LISPRO (HUMAN) PER 5 UNITS: Performed by: STUDENT IN AN ORGANIZED HEALTH CARE EDUCATION/TRAINING PROGRAM

## 2023-06-03 PROCEDURE — 82948 REAGENT STRIP/BLOOD GLUCOSE: CPT

## 2023-06-03 PROCEDURE — 99239 HOSP IP/OBS DSCHRG MGMT >30: CPT | Performed by: INTERNAL MEDICINE

## 2023-06-03 RX ORDER — NITROGLYCERIN 0.4 MG/1
0.4 TABLET SUBLINGUAL
Qty: 25 TABLET | Refills: 3 | Status: SHIPPED | OUTPATIENT
Start: 2023-06-03

## 2023-06-03 RX ORDER — ROSUVASTATIN CALCIUM 20 MG/1
20 TABLET, COATED ORAL NIGHTLY
Qty: 90 TABLET | Refills: 3 | Status: SHIPPED | OUTPATIENT
Start: 2023-06-03

## 2023-06-03 RX ADMIN — ASPIRIN 81 MG: 81 TABLET, COATED ORAL at 08:26

## 2023-06-03 RX ADMIN — INSULIN LISPRO 2 UNITS: 100 INJECTION, SOLUTION INTRAVENOUS; SUBCUTANEOUS at 08:26

## 2023-06-03 RX ADMIN — METOPROLOL TARTRATE 25 MG: 25 TABLET, FILM COATED ORAL at 08:26

## 2023-06-03 RX ADMIN — LOSARTAN POTASSIUM 25 MG: 25 TABLET, FILM COATED ORAL at 08:26

## 2023-06-03 RX ADMIN — TICAGRELOR 90 MG: 90 TABLET ORAL at 08:26

## 2023-06-03 RX ADMIN — ALLOPURINOL 200 MG: 100 TABLET ORAL at 08:26

## 2023-06-03 NOTE — OUTREACH NOTE
Prep Survey      Flowsheet Row Responses   [facility] patient discharged from? [facility]   Is LACE score < 7 ? No   Eligibility [facility]   Date of Admission 06/01/23   Date of Discharge 06/03/23   Discharge Disposition Home or Self Care   Discharge diagnosis Acute ST elevation myocardial infarction (STEMI) involving right coronary artery   Does the patient have one of the following disease processes/diagnoses(primary or secondary)? Acute MI (STEMI,NSTEMI)   Does the patient have Home health ordered? No   Is there a DME ordered? No   Prep survey completed? Yes            Dawn BRAND - Registered Nurse

## 2023-06-03 NOTE — PLAN OF CARE
Goal Outcome Evaluation:pt had no new complaints or medical issues overnight. Denies SOA or CHEST PAIN Possible discharge  today per cardiology

## 2023-06-03 NOTE — DISCHARGE SUMMARY
[facility]         CARDIOLOGY DISCHARGE SUMMARY    Patient Name: Man Gill  : 1965  MRN: [id number]    Date of Admission: 2023  Date of Discharge: 6/3/2023  Primary Care Physician: Vadim Mishra MD    Consults       No orders found from 5/3/2023 to 2023.            Presenting Problem:   Acute ST elevation myocardial infarction (STEMI) involving right coronary artery [I21.11]    Active and Resolved Hospital Problems:  Active Hospital Problems    Diagnosis POA    **Acute ST elevation myocardial infarction (STEMI) involving right coronary artery [I21.11] Yes      Resolved Hospital Problems   No resolved problems to display.         Hospital Course     Hospital Course:  Man Gill is a 57 y.o. male with hypertension, diabetes, dyslipidemia, presented to the hospital with chest pain, was found to have acute inferior ST elevation MI, status post successful primary PCI using 3 overlapping drug-eluting stents, has residual moderate LAD stenosis to be evaluated as an outpatient.  He did well without any events during his hospitalization.  No arrhythmias were noted.  LVEF was preserved.  He will be discharged in a stable condition.  He will need to stay on dual antiplatelet therapy for at least 1 year followed by aspirin for life.        DISCHARGE Follow Up Recommendations for labs and diagnostics: n/a      Day of Discharge     Vital Signs:  Temp:  [97.7 °F (36.5 °C)-98.8 °F (37.1 °C)] 98.1 °F (36.7 °C)  Heart Rate:  [73-99] 78  Resp:  [16] 16  BP: (108-117)/(65-76) 114/65  Physical Exam:  Neck: no JVD, no bruit  Lungs: clear to ausculation bilaterally.  No crackles or wheezes  CV: regular rate and rhythm, no murmur  Ext: no cyanosis, clubbing or edema.  Normal bilateral LE pulses.       Pertinent  and/or Most Recent Results     LAB RESULTS:      Lab 23  0435 23  0644   WBC 9.37 10.12   HEMOGLOBIN 14.1 15.9   HEMATOCRIT 42.2 45.7   PLATELETS 189 221    NEUTROS ABS  --  8.36*   IMMATURE GRANS (ABS)  --  0.03   LYMPHS ABS  --  1.28   MONOS ABS  --  0.39   EOS ABS  --  0.03   MCV 87.6 83.7         Lab 06/02/23  0435 06/01/23  0644   SODIUM 136 137   POTASSIUM 4.2 4.1   CHLORIDE 102 99   CO2 23.0 24.9   ANION GAP 11.0 13.1   BUN 11 13   CREATININE 0.94 1.08   EGFR 93.4 79.1   GLUCOSE 150* 201*   CALCIUM 8.8 11.5*   MAGNESIUM 1.7 1.5*   PHOSPHORUS 3.4  --    HEMOGLOBIN A1C  --  7.30*         Lab 06/01/23  0644   TOTAL PROTEIN 7.3   ALBUMIN 4.6   GLOBULIN 2.7   ALT (SGPT) 42*   AST (SGOT) 33   BILIRUBIN 0.5   ALK PHOS 85   LIPASE 26         Lab 06/01/23  1020 06/01/23  0644   PROBNP  --  495.0   HSTROP T 4,939* 77*                 Brief Urine Lab Results       None          Microbiology Results (last 10 days)       ** No results found for the last 240 hours. **            XR Chest 1 View    Result Date: 6/1/2023  Impression:  No acute cardiopulmonary disease is seen radiographically.     Please note that portions of this note were completed with a voice recognition program.  JOSE LUNA JR, MD       Electronically Signed and Approved By: JOSE LUNA JR, MD on 6/01/2023 at 7:23                       Results for orders placed during the hospital encounter of 06/01/23    Adult Transthoracic Echo Complete W/ Cont if Necessary Per Protocol    Interpretation Summary    Left ventricular ejection fraction appears to be 51 - 55%.  Inferior wall appears to be moving normally.    Left ventricular wall thickness is consistent with borderline concentric hypertrophy.    Left ventricular diastolic function is consistent with (grade I) impaired relaxation.    No significant valvular disease.      Labs Pending at Discharge:        Discharge Details        Discharge Medications        New Medications        Instructions Start Date   metoprolol tartrate 25 MG tablet  Commonly known as: LOPRESSOR   25 mg, Oral, Every 12 Hours Scheduled      nitroglycerin 0.4 MG SL tablet  Commonly  known as: NITROSTAT   0.4 mg, Sublingual, Every 5 Minutes PRN, Take no more than 3 doses in 15 minutes.      rosuvastatin 20 MG tablet  Commonly known as: CRESTOR   20 mg, Oral, Nightly      ticagrelor 90 MG tablet tablet  Commonly known as: BRILINTA   90 mg, Oral, 2 Times Daily             Continue These Medications        Instructions Start Date   albuterol sulfate  (90 Base) MCG/ACT inhaler  Commonly known as: PROVENTIL HFA;VENTOLIN HFA;PROAIR HFA   2 puffs, Inhalation, Every 4 Hours PRN      allopurinol 100 MG tablet  Commonly known as: ZYLOPRIM   200 mg, Oral, Daily      aspirin 81 MG EC tablet   81 mg, Oral, Daily      glipizide 5 MG tablet  Commonly known as: GLUCOTROL   5 mg, Oral, 2 Times Daily Before Meals      losartan 100 MG tablet  Commonly known as: COZAAR   100 mg, Oral, Daily      metFORMIN  MG 24 hr tablet  Commonly known as: GLUCOPHAGE-XR   1,000 mg, Oral, Daily With Dinner             Stop These Medications      pravastatin 20 MG tablet  Commonly known as: PRAVACHOL              No Known Allergies      Discharge Disposition:  Home or Self Care    Diet:  Hospital:  Diet Order   Procedures    Diet: Cardiac Diets, Diabetic Diets; Healthy Heart (2-3 Na+); Consistent Carbohydrate; Texture: Regular Texture (IDDSI 7); Fluid Consistency: Thin (IDDSI 0)         Discharge Activity:   No lifting over 10 to 15 pounds for 1 week otherwise resume physical activity as tolerated      CODE STATUS:  There are no questions and answers to display.   Full code      Future Appointments   Date Time Provider Department Center   12/1/2023  4:15 PM Vadim Mishra MD [facility]       Additional Instructions for the Follow-ups that You Need to Schedule       Ambulatory Referral to Cardiac Rehab   As directed          Follow-up with Dr. Jin Harrison within 2 to 3 weeks after discharge.    Time spent on Discharge including face to face service: 35 minutes minutes    Electronically signed by Deep  MD Doretha, 06/03/23, 11:04 AM EDT.

## 2023-06-05 ENCOUNTER — TRANSITIONAL CARE MANAGEMENT TELEPHONE ENCOUNTER (OUTPATIENT)
Dept: CALL CENTER | Facility: HOSPITAL | Age: 58
End: 2023-06-05
Payer: COMMERCIAL

## 2023-06-07 ENCOUNTER — TELEPHONE (OUTPATIENT)
Dept: ELECTROPHYSIOLOGY | Age: 58
End: 2023-06-07

## 2023-06-07 ENCOUNTER — ANCILLARY PROCEDURE (OUTPATIENT)
Dept: ELECTROPHYSIOLOGY | Age: 58
End: 2023-06-07

## 2023-06-07 DIAGNOSIS — R00.1 BRADYCARDIA: ICD-10-CM

## 2023-06-07 PROCEDURE — 93294 REM INTERROG EVL PM/LDLS PM: CPT | Performed by: INTERNAL MEDICINE

## 2023-06-07 PROCEDURE — 93296 REM INTERROG EVL PM/IDS: CPT | Performed by: INTERNAL MEDICINE

## 2023-06-08 LAB — QT INTERVAL: 364 MS

## 2023-06-09 ENCOUNTER — OFFICE VISIT (OUTPATIENT)
Dept: FAMILY MEDICINE CLINIC | Age: 58
End: 2023-06-09
Payer: COMMERCIAL

## 2023-06-09 VITALS
DIASTOLIC BLOOD PRESSURE: 86 MMHG | OXYGEN SATURATION: 97 % | HEIGHT: 70 IN | WEIGHT: 188.8 LBS | BODY MASS INDEX: 27.03 KG/M2 | SYSTOLIC BLOOD PRESSURE: 132 MMHG | HEART RATE: 74 BPM

## 2023-06-09 DIAGNOSIS — E11.65 TYPE 2 DIABETES MELLITUS WITH HYPERGLYCEMIA, WITHOUT LONG-TERM CURRENT USE OF INSULIN: ICD-10-CM

## 2023-06-09 DIAGNOSIS — E78.2 MIXED HYPERLIPIDEMIA: ICD-10-CM

## 2023-06-09 DIAGNOSIS — I10 ESSENTIAL HYPERTENSION: ICD-10-CM

## 2023-06-09 DIAGNOSIS — I25.10 CORONARY ARTERY DISEASE INVOLVING NATIVE CORONARY ARTERY OF NATIVE HEART WITHOUT ANGINA PECTORIS: Primary | ICD-10-CM

## 2023-06-09 RX ORDER — LOSARTAN POTASSIUM 25 MG/1
25 TABLET ORAL DAILY
Qty: 90 TABLET | Refills: 3 | Status: SHIPPED | OUTPATIENT
Start: 2023-06-09

## 2023-06-09 NOTE — PROGRESS NOTES
Man Gill presents to [facility].    Chief Complaint:  Follow up on MI, CAD    Transitional Care Unit:   -Gera was admitted to [facility] on 6/1/2023 with discharge on 6/3/2023.   -While inpatient, he was cared for by the hospitalist service and cardiology.   -Staff from [facility] reach out to him within 48 hours of hospital discharge to arrange follow-up.   -His problems and medications have been reviewed and reconciled.    Subjective      History of Present Illness:  Gera is being seen today for follow-up on his care.  He suffered an acute inferior myocardial infarction on 6/1/2023 for which he presented to the emergency department at [facility].  He was quickly taken to the Cath Lab and states that he had 3 stents placed as a result of this.  He did well postprocedure and was discharged home within the next 48 hours.    Since getting home, he has been feeling well.  He denies shortness of breath or chest pain.  He actually went back to work almost immediately.  He does have scheduled cardiology follow-up on 6/21/2023.    Regarding hypertension, losartan dose was lowered to 25 mg daily.  He did receive several new medications that have been reviewed.    Review of Systems:  Review of Systems   Constitutional:  Negative for chills and fever.   Respiratory:  Negative for cough and shortness of breath.    Cardiovascular:  Negative for chest pain and palpitations.   Gastrointestinal:  Negative for abdominal pain, nausea and vomiting.      Objective   Medical History:  Past Medical History:   • Coronary artery disease    Had 3 stints put in one artery   • Essential hypertension   • Gout   • Kidney stones   • Mixed hyperlipidemia   • Type 2 diabetes mellitus     Past Surgical History:   • CARDIAC CATHETERIZATION   • CARDIAC CATHETERIZATION    Procedure: Left Heart Cath;  Surgeon: Jin Harrison MD;  Location: [facility] CATH INVASIVE LOCATION;  Service: Cardiology;   Laterality: N/A;   • CARDIAC CATHETERIZATION    Procedure: Percutaneous Coronary Intervention;  Surgeon: Jin Harrison MD;  Location: [facility] INVASIVE LOCATION;  Service: Cardiology;  Laterality: N/A;   • COLONOSCOPY    Normal      Family History   Problem Relation Age of Onset   • Hypertension Mother    • Coronary artery disease Father    • Hypertension Father    • Coronary artery disease Brother      Social History     Tobacco Use   • Smoking status: Former     Packs/day: 1.00     Years: 20.00     Pack years: 20.00     Types: Cigarettes     Quit date: 1996     Years since quittin.4   • Smokeless tobacco: Never   Substance Use Topics   • Alcohol use: Not Currently       Health Maintenance Due   Topic Date Due   • Hepatitis B (1 of 3 - 3-dose series) Never done   • COVID-19 Vaccine (1) Never done   • Pneumococcal Vaccine 0-64 (1 - PCV) Never done   • ZOSTER VACCINE (1 of 2) Never done   • DIABETIC EYE EXAM  Never done   • TDAP/TD VACCINES (3 - Td or Tdap) 08/10/2022   • URINE MICROALBUMIN  2023        Immunization History   Administered Date(s) Administered   • Td 2004   • Tdap 08/10/2012       No Known Allergies     Medications:  Current Outpatient Medications on File Prior to Visit   Medication Sig   • albuterol sulfate  (90 Base) MCG/ACT inhaler Inhale 2 puffs Every 4 (Four) Hours As Needed for Wheezing or Shortness of Air (cough).   • allopurinol (ZYLOPRIM) 100 MG tablet Take 2 tablets by mouth Daily.   • aspirin 81 MG EC tablet Take 1 tablet by mouth Daily.   • glipizide (GLUCOTROL) 5 MG tablet Take 1 tablet by mouth 2 (Two) Times a Day Before Meals.   • metFORMIN ER (GLUCOPHAGE-XR) 500 MG 24 hr tablet Take 2 tablets by mouth Daily With Dinner.   • metoprolol tartrate (LOPRESSOR) 25 MG tablet Take 1 tablet by mouth Every 12 (Twelve) Hours.   • nitroglycerin (NITROSTAT) 0.4 MG SL tablet Place 1 tablet under the tongue Every 5 (Five) Minutes As Needed for Chest Pain. Take  "no more than 3 doses in 15 minutes.   • rosuvastatin (CRESTOR) 20 MG tablet Take 1 tablet by mouth Every Night.   • ticagrelor (BRILINTA) 90 MG tablet tablet Take 1 tablet by mouth 2 (Two) Times a Day.   • [DISCONTINUED] losartan (COZAAR) 100 MG tablet Take 1 tablet by mouth Daily. (Patient taking differently: Take 25 mg by mouth Daily.)     No current facility-administered medications on file prior to visit.       Vital Signs:   /86 (BP Location: Right arm, Patient Position: Sitting, Cuff Size: Adult)   Pulse 74   Ht 177.8 cm (70\")   Wt 85.6 kg (188 lb 12.8 oz)   SpO2 97%   BMI 27.09 kg/m²       Physical Exam:  Physical Exam  Vitals and nursing note reviewed.   Constitutional:       General: He is not in acute distress.     Appearance: He is not ill-appearing.   HENT:      Right Ear: Tympanic membrane and ear canal normal.      Left Ear: Tympanic membrane and ear canal normal.      Mouth/Throat:      Mouth: Mucous membranes are moist.      Comments: Pharynx appears normal  Eyes:      Extraocular Movements: Extraocular movements intact.      Pupils: Pupils are equal, round, and reactive to light.   Neck:      Thyroid: No thyromegaly.   Cardiovascular:      Rate and Rhythm: Normal rate and regular rhythm.      Heart sounds: No murmur heard.  Pulmonary:      Effort: Pulmonary effort is normal.      Breath sounds: Normal breath sounds.   Abdominal:      General: There is no distension.      Palpations: Abdomen is soft. There is no mass.      Tenderness: There is no abdominal tenderness.   Musculoskeletal:      Cervical back: Normal range of motion.   Skin:     Findings: No lesion or rash.   Neurological:      General: No focal deficit present.      Mental Status: He is oriented to person, place, and time.      Cranial Nerves: No cranial nerve deficit.   Psychiatric:         Mood and Affect: Mood normal.       Result Review      The following data was reviewed by Vadim Mishra MD on 06/09/2023.  Lab " Results   Component Value Date    WBC 9.37 06/02/2023    HGB 14.1 06/02/2023    HCT 42.2 06/02/2023    MCV 87.6 06/02/2023     06/02/2023     Lab Results   Component Value Date    GLUCOSE 150 (H) 06/02/2023    BUN 11 06/02/2023    CREATININE 0.94 06/02/2023     06/02/2023    K 4.2 06/02/2023     06/02/2023    CO2 23.0 06/02/2023    CALCIUM 8.8 06/02/2023    PROTEINTOT 7.3 06/01/2023    ALBUMIN 4.6 06/01/2023    ALT 42 (H) 06/01/2023    AST 33 06/01/2023    ALKPHOS 85 06/01/2023    BILITOT 0.5 06/01/2023    EGFR 93.4 06/02/2023    GLOB 2.7 06/01/2023    AGRATIO 1.7 06/01/2023    BCR 11.7 06/02/2023    ANIONGAP 11.0 06/02/2023      Lab Results   Component Value Date    CHOL 172 02/27/2023    CHLPL 151 04/03/2021    TRIG 248 (H) 02/27/2023    HDL 35 (L) 02/27/2023    LDL 95 02/27/2023     Lab Results   Component Value Date    TSH 2.350 11/21/2022     Lab Results   Component Value Date    HGBA1C 7.30 (H) 06/01/2023     Lab Results   Component Value Date    PSA 0.444 05/17/2022    PSA 0.51 04/03/2021    PSA 0.50 01/14/2020            Assessment and Plan:   Today, we have reviewed his care.  Gera seems to be doing very well after being in the hospital.  He has follow-up with cardiology scheduled already for couple of weeks down the road.  We will refill losartan for him as noted below.  I will also review his chart further later and put an updated lab orders.  He was supposed to have some blood work in early July, and I will likely repeat the lipid profile given the change in his medication.  Otherwise, we will tentatively plan to see him back in December as scheduled, sooner if needed.     Diagnoses and all orders for this visit:    1. Coronary artery disease involving native coronary artery of native heart without angina pectoris (Primary)  Comments:  As above.    2. Essential hypertension  -     losartan (COZAAR) 25 MG tablet; Take 1 tablet by mouth Daily.  Dispense: 90 tablet; Refill: 3    3. Mixed  hyperlipidemia  Comments:  As above.    4. Type 2 diabetes mellitus with hyperglycemia, without long-term current use of insulin  Comments:  As above.    Follow Up   Return in about 26 weeks (around 12/8/2023) for Recheck as scheduled.  Patient was given instructions and counseling regarding his condition or for health maintenance advice. Please see specific information pulled into the AVS if appropriate.

## 2023-06-13 ENCOUNTER — ANCILLARY ORDERS (OUTPATIENT)
Dept: ELECTROPHYSIOLOGY | Age: 58
End: 2023-06-13

## 2023-06-13 DIAGNOSIS — R00.1 BRADYCARDIA: ICD-10-CM

## 2023-06-14 LAB
MDC_IDC_EPISODE_DTM: NORMAL
MDC_IDC_EPISODE_DURATION: 108 S
MDC_IDC_EPISODE_DURATION: 116 S
MDC_IDC_EPISODE_DURATION: 15 S
MDC_IDC_EPISODE_DURATION: 20 S
MDC_IDC_EPISODE_DURATION: 22 S
MDC_IDC_EPISODE_DURATION: 27 S
MDC_IDC_EPISODE_DURATION: 30 S
MDC_IDC_EPISODE_DURATION: 34 S
MDC_IDC_EPISODE_DURATION: 39 S
MDC_IDC_EPISODE_DURATION: 4 S
MDC_IDC_EPISODE_DURATION: 437 S
MDC_IDC_EPISODE_DURATION: 44 S
MDC_IDC_EPISODE_DURATION: 45 S
MDC_IDC_EPISODE_DURATION: 48 S
MDC_IDC_EPISODE_DURATION: 48 S
MDC_IDC_EPISODE_DURATION: 55 S
MDC_IDC_EPISODE_DURATION: 55 S
MDC_IDC_EPISODE_DURATION: 58 S
MDC_IDC_EPISODE_DURATION: 60 S
MDC_IDC_EPISODE_DURATION: 66 S
MDC_IDC_EPISODE_DURATION: 7 S
MDC_IDC_EPISODE_DURATION: 70 S
MDC_IDC_EPISODE_DURATION: 72 S
MDC_IDC_EPISODE_DURATION: 79 S
MDC_IDC_EPISODE_DURATION: 9 S
MDC_IDC_EPISODE_ID: 934
MDC_IDC_EPISODE_ID: 935
MDC_IDC_EPISODE_ID: 936
MDC_IDC_EPISODE_ID: 937
MDC_IDC_EPISODE_ID: 938
MDC_IDC_EPISODE_ID: 939
MDC_IDC_EPISODE_ID: 940
MDC_IDC_EPISODE_ID: 941
MDC_IDC_EPISODE_ID: 942
MDC_IDC_EPISODE_ID: 943
MDC_IDC_EPISODE_ID: 944
MDC_IDC_EPISODE_ID: 945
MDC_IDC_EPISODE_ID: 946
MDC_IDC_EPISODE_ID: 947
MDC_IDC_EPISODE_ID: 948
MDC_IDC_EPISODE_ID: 949
MDC_IDC_EPISODE_ID: 950
MDC_IDC_EPISODE_ID: 951
MDC_IDC_EPISODE_ID: 952
MDC_IDC_EPISODE_ID: 953
MDC_IDC_EPISODE_ID: 954
MDC_IDC_EPISODE_ID: 955
MDC_IDC_EPISODE_ID: 956
MDC_IDC_EPISODE_ID: 957
MDC_IDC_EPISODE_ID: 958
MDC_IDC_EPISODE_TYPE: NORMAL
MDC_IDC_EPISODE_VENDOR_TYPE: NORMAL
MDC_IDC_MSMT_BATTERY_DTM: NORMAL
MDC_IDC_MSMT_BATTERY_REMAINING_LONGEVITY: 48 MO
MDC_IDC_MSMT_BATTERY_RRT_TRIGGER: 2.83
MDC_IDC_MSMT_BATTERY_STATUS: NORMAL
MDC_IDC_MSMT_BATTERY_VOLTAGE: 2.98 V
MDC_IDC_MSMT_LEADCHNL_RA_IMPEDANCE_VALUE: 418 OHM
MDC_IDC_MSMT_LEADCHNL_RA_IMPEDANCE_VALUE: 475 OHM
MDC_IDC_MSMT_LEADCHNL_RA_PACING_THRESHOLD_AMPLITUDE: 0.5 V
MDC_IDC_MSMT_LEADCHNL_RA_PACING_THRESHOLD_PULSEWIDTH: 0.4 MS
MDC_IDC_MSMT_LEADCHNL_RA_SENSING_INTR_AMPL: 2.25 MV
MDC_IDC_MSMT_LEADCHNL_RA_SENSING_INTR_AMPL: 2.25 MV
MDC_IDC_MSMT_LEADCHNL_RV_IMPEDANCE_VALUE: 380 OHM
MDC_IDC_MSMT_LEADCHNL_RV_IMPEDANCE_VALUE: 456 OHM
MDC_IDC_MSMT_LEADCHNL_RV_PACING_THRESHOLD_AMPLITUDE: 0.75 V
MDC_IDC_MSMT_LEADCHNL_RV_PACING_THRESHOLD_PULSEWIDTH: 0.4 MS
MDC_IDC_MSMT_LEADCHNL_RV_SENSING_INTR_AMPL: 3.12 MV
MDC_IDC_MSMT_LEADCHNL_RV_SENSING_INTR_AMPL: 3.12 MV
MDC_IDC_PG_IMPLANT_DTM: NORMAL
MDC_IDC_PG_MFG: NORMAL
MDC_IDC_PG_MODEL: NORMAL
MDC_IDC_PG_SERIAL: NORMAL
MDC_IDC_PG_TYPE: NORMAL
MDC_IDC_SESS_CLINIC_NAME: NORMAL
MDC_IDC_SESS_DTM: NORMAL
MDC_IDC_SESS_TYPE: NORMAL
MDC_IDC_SET_BRADY_AT_MODE_SWITCH_RATE: 162 {BEATS}/MIN
MDC_IDC_SET_BRADY_HYSTRATE: NORMAL
MDC_IDC_SET_BRADY_LOWRATE: 50 {BEATS}/MIN
MDC_IDC_SET_BRADY_MAX_SENSOR_RATE: 130 {BEATS}/MIN
MDC_IDC_SET_BRADY_MAX_TRACKING_RATE: 130 {BEATS}/MIN
MDC_IDC_SET_BRADY_MODE: NORMAL
MDC_IDC_SET_BRADY_PAV_DELAY_LOW: 180 MS
MDC_IDC_SET_BRADY_SAV_DELAY_LOW: 150 MS
MDC_IDC_SET_LEADCHNL_RA_PACING_AMPLITUDE: 1.5 V
MDC_IDC_SET_LEADCHNL_RA_PACING_ANODE_ELECTRODE_1: NORMAL
MDC_IDC_SET_LEADCHNL_RA_PACING_ANODE_LOCATION_1: NORMAL
MDC_IDC_SET_LEADCHNL_RA_PACING_CAPTURE_MODE: NORMAL
MDC_IDC_SET_LEADCHNL_RA_PACING_CATHODE_ELECTRODE_1: NORMAL
MDC_IDC_SET_LEADCHNL_RA_PACING_CATHODE_LOCATION_1: NORMAL
MDC_IDC_SET_LEADCHNL_RA_PACING_POLARITY: NORMAL
MDC_IDC_SET_LEADCHNL_RA_PACING_PULSEWIDTH: 0.4 MS
MDC_IDC_SET_LEADCHNL_RA_SENSING_ANODE_ELECTRODE_1: NORMAL
MDC_IDC_SET_LEADCHNL_RA_SENSING_ANODE_LOCATION_1: NORMAL
MDC_IDC_SET_LEADCHNL_RA_SENSING_CATHODE_ELECTRODE_1: NORMAL
MDC_IDC_SET_LEADCHNL_RA_SENSING_CATHODE_LOCATION_1: NORMAL
MDC_IDC_SET_LEADCHNL_RA_SENSING_POLARITY: NORMAL
MDC_IDC_SET_LEADCHNL_RA_SENSING_SENSITIVITY: 0.6 MV
MDC_IDC_SET_LEADCHNL_RV_PACING_AMPLITUDE: 2 V
MDC_IDC_SET_LEADCHNL_RV_PACING_ANODE_ELECTRODE_1: NORMAL
MDC_IDC_SET_LEADCHNL_RV_PACING_ANODE_LOCATION_1: NORMAL
MDC_IDC_SET_LEADCHNL_RV_PACING_CAPTURE_MODE: NORMAL
MDC_IDC_SET_LEADCHNL_RV_PACING_CATHODE_ELECTRODE_1: NORMAL
MDC_IDC_SET_LEADCHNL_RV_PACING_CATHODE_LOCATION_1: NORMAL
MDC_IDC_SET_LEADCHNL_RV_PACING_POLARITY: NORMAL
MDC_IDC_SET_LEADCHNL_RV_PACING_PULSEWIDTH: 0.4 MS
MDC_IDC_SET_LEADCHNL_RV_SENSING_ANODE_ELECTRODE_1: NORMAL
MDC_IDC_SET_LEADCHNL_RV_SENSING_ANODE_LOCATION_1: NORMAL
MDC_IDC_SET_LEADCHNL_RV_SENSING_CATHODE_ELECTRODE_1: NORMAL
MDC_IDC_SET_LEADCHNL_RV_SENSING_CATHODE_LOCATION_1: NORMAL
MDC_IDC_SET_LEADCHNL_RV_SENSING_POLARITY: NORMAL
MDC_IDC_SET_LEADCHNL_RV_SENSING_SENSITIVITY: 0.9 MV
MDC_IDC_SET_ZONE_DETECTION_INTERVAL: 370 MS
MDC_IDC_SET_ZONE_DETECTION_INTERVAL: 400 MS
MDC_IDC_SET_ZONE_TYPE: NORMAL
MDC_IDC_SET_ZONE_VENDOR_TYPE: NORMAL
MDC_IDC_STAT_AT_BURDEN_PERCENT: 0 %
MDC_IDC_STAT_AT_DTM_END: NORMAL
MDC_IDC_STAT_AT_DTM_START: NORMAL
MDC_IDC_STAT_BRADY_AP_VP_PERCENT: 0.01 %
MDC_IDC_STAT_BRADY_AP_VS_PERCENT: 5.28 %
MDC_IDC_STAT_BRADY_AS_VP_PERCENT: 0.03 %
MDC_IDC_STAT_BRADY_AS_VS_PERCENT: 94.69 %
MDC_IDC_STAT_BRADY_DTM_END: NORMAL
MDC_IDC_STAT_BRADY_DTM_START: NORMAL
MDC_IDC_STAT_BRADY_RA_PERCENT_PACED: 5.28 %
MDC_IDC_STAT_BRADY_RV_PERCENT_PACED: 0.04 %
MDC_IDC_STAT_EPISODE_RECENT_COUNT: 0
MDC_IDC_STAT_EPISODE_RECENT_COUNT: 31
MDC_IDC_STAT_EPISODE_RECENT_COUNT_DTM_END: NORMAL
MDC_IDC_STAT_EPISODE_RECENT_COUNT_DTM_START: NORMAL
MDC_IDC_STAT_EPISODE_TOTAL_COUNT: 0
MDC_IDC_STAT_EPISODE_TOTAL_COUNT: 2
MDC_IDC_STAT_EPISODE_TOTAL_COUNT: 4
MDC_IDC_STAT_EPISODE_TOTAL_COUNT: 951
MDC_IDC_STAT_EPISODE_TOTAL_COUNT_DTM_END: NORMAL
MDC_IDC_STAT_EPISODE_TOTAL_COUNT_DTM_START: NORMAL
MDC_IDC_STAT_EPISODE_TYPE: NORMAL

## 2023-07-05 PROBLEM — I21.3 ST ELEVATION MYOCARDIAL INFARCTION (STEMI): Status: ACTIVE | Noted: 2023-07-05

## 2023-08-08 RX ORDER — FLUTICASONE PROPIONATE 50 MCG
SPRAY, SUSPENSION (ML) NASAL
Qty: 48 ML | Refills: 11 | Status: SHIPPED | OUTPATIENT
Start: 2023-08-08

## 2023-08-17 DIAGNOSIS — E11.9 TYPE 2 DIABETES MELLITUS WITHOUT COMPLICATION, WITHOUT LONG-TERM CURRENT USE OF INSULIN: ICD-10-CM

## 2023-08-17 RX ORDER — LANCETS
EACH MISCELLANEOUS
Qty: 100 EACH | Refills: 3 | Status: SHIPPED | OUTPATIENT
Start: 2023-08-17

## 2023-08-17 RX ORDER — BLOOD SUGAR DIAGNOSTIC
STRIP MISCELLANEOUS
Qty: 100 EACH | Refills: 3 | Status: SHIPPED | OUTPATIENT
Start: 2023-08-17

## 2023-08-23 ENCOUNTER — TELEPHONE (OUTPATIENT)
Dept: PULMONOLOGY | Age: 58
End: 2023-08-23

## 2023-08-23 DIAGNOSIS — G47.31 COMPLEX SLEEP APNEA SYNDROME: Primary | ICD-10-CM

## 2023-08-25 ENCOUNTER — TELEPHONE (OUTPATIENT)
Dept: FAMILY MEDICINE CLINIC | Age: 58
End: 2023-08-25

## 2023-08-25 NOTE — TELEPHONE ENCOUNTER
Caller: SYD ROBLES    Relationship to patient: Emergency Contact    Best call back number: [phone]     Patient is needing: CALLER REQUESTING APPOINTMENT FOR CORTISONE INJECTION DISCUSSION WITH PHYSICIAN. NEXT AVAILABLE APPOINTMENT WITH PCP WAS 09.11.2023. PATIENT NEEDED SOONER APPOINTMENT. SCHEDULED WITH MAYO NEWSOME FOR 08.28.2023 AT 4PM

## 2023-08-28 ENCOUNTER — OFFICE VISIT (OUTPATIENT)
Dept: FAMILY MEDICINE CLINIC | Age: 58
End: 2023-08-28
Payer: COMMERCIAL

## 2023-08-28 VITALS
HEART RATE: 71 BPM | DIASTOLIC BLOOD PRESSURE: 86 MMHG | HEIGHT: 70 IN | BODY MASS INDEX: 28.18 KG/M2 | WEIGHT: 196.8 LBS | SYSTOLIC BLOOD PRESSURE: 145 MMHG

## 2023-08-28 DIAGNOSIS — M25.511 CHRONIC RIGHT SHOULDER PAIN: Primary | ICD-10-CM

## 2023-08-28 DIAGNOSIS — G89.29 CHRONIC RIGHT SHOULDER PAIN: Primary | ICD-10-CM

## 2023-08-28 PROCEDURE — 99213 OFFICE O/P EST LOW 20 MIN: CPT | Performed by: NURSE PRACTITIONER

## 2023-08-28 RX ORDER — PRAVASTATIN SODIUM 20 MG
20 TABLET ORAL EVERY EVENING
COMMUNITY
Start: 2023-07-11

## 2023-08-28 NOTE — PROGRESS NOTES
"Chief Complaint  Shoulder Pain (Pt c/o (R) shoulder pain, only hurts when he lifts, thinks he has a tear./)    Subjective          Man Gill presents to [facility]  History of Present Illness  Right shoulder pain x several years.   Arm Pain   The incident occurred more than 1 week ago. There was no injury mechanism. The pain is present in the right shoulder. Quality: sharp, electric shock. The pain does not radiate. The pain is at a severity of 9/10. The pain is severe. The pain has been Fluctuating since the incident. Pertinent negatives include no muscle weakness, numbness or tingling. Exacerbated by: repititive motion. Treatments tried: physical therapy. The treatment provided no relief.     Objective   Vital Signs:   /86 (BP Location: Right arm, Patient Position: Sitting)   Pulse 71   Ht 177.8 cm (70\")   Wt 89.3 kg (196 lb 12.8 oz)   BMI 28.24 kg/mý     Physical Exam  Constitutional:       General: He is not in acute distress.     Appearance: Normal appearance. He is normal weight.   HENT:      Head: Normocephalic.   Eyes:      Pupils: Pupils are equal, round, and reactive to light.      Visual Fields: Right eye visual fields normal and left eye visual fields normal.   Neck:      Trachea: Trachea normal.   Cardiovascular:      Rate and Rhythm: Normal rate and regular rhythm.      Heart sounds: Normal heart sounds.   Pulmonary:      Effort: Pulmonary effort is normal.      Breath sounds: Normal breath sounds and air entry.   Musculoskeletal:      Right lower leg: No edema.      Left lower leg: No edema.   Skin:     General: Skin is warm and dry.   Neurological:      Mental Status: He is alert and oriented to person, place, and time.   Psychiatric:         Mood and Affect: Mood and affect normal.         Behavior: Behavior normal.         Thought Content: Thought content normal.      Result Review :   The following data was reviewed by: GUANAKO Kang on " 08/28/2023:  XR Shoulder 2+ View Right (03/14/2023 16:20)                   Assessment and Plan    Diagnoses and all orders for this visit:    1. Chronic right shoulder pain (Primary)  -     MRI Shoulder Right Without Contrast; Future    We will order an MRI of his right shoulder and schedule him with our nurse practitioner that does shoulder injections.  We have discussed avoiding NSAID use due to his cardiac history and blood thinner use.  I recommend that he use Tylenol instead of NSAID.      Follow Up   Return for Next scheduled follow up.  Patient was given instructions and counseling regarding his condition or for health maintenance advice. Please see specific information pulled into the AVS if appropriate.

## 2023-08-30 ENCOUNTER — PROCEDURE VISIT (OUTPATIENT)
Dept: FAMILY MEDICINE CLINIC | Age: 58
End: 2023-08-30
Payer: COMMERCIAL

## 2023-08-30 VITALS
TEMPERATURE: 98.5 F | SYSTOLIC BLOOD PRESSURE: 147 MMHG | HEART RATE: 92 BPM | OXYGEN SATURATION: 97 % | HEIGHT: 70 IN | DIASTOLIC BLOOD PRESSURE: 90 MMHG | WEIGHT: 196 LBS | BODY MASS INDEX: 28.06 KG/M2

## 2023-08-30 DIAGNOSIS — M25.511 CHRONIC RIGHT SHOULDER PAIN: Primary | ICD-10-CM

## 2023-08-30 DIAGNOSIS — G89.29 CHRONIC RIGHT SHOULDER PAIN: Primary | ICD-10-CM

## 2023-08-30 RX ORDER — TRIAMCINOLONE ACETONIDE 40 MG/ML
40 INJECTION, SUSPENSION INTRA-ARTICULAR; INTRAMUSCULAR
Status: COMPLETED | OUTPATIENT
Start: 2023-08-30 | End: 2023-08-30

## 2023-08-30 RX ORDER — LIDOCAINE HYDROCHLORIDE 10 MG/ML
3 INJECTION, SOLUTION INFILTRATION; PERINEURAL
Status: COMPLETED | OUTPATIENT
Start: 2023-08-30 | End: 2023-08-30

## 2023-08-30 RX ADMIN — LIDOCAINE HYDROCHLORIDE 3 ML: 10 INJECTION, SOLUTION INFILTRATION; PERINEURAL at 16:17

## 2023-08-30 RX ADMIN — TRIAMCINOLONE ACETONIDE 40 MG: 40 INJECTION, SUSPENSION INTRA-ARTICULAR; INTRAMUSCULAR at 16:17

## 2023-08-30 NOTE — PROGRESS NOTES
"Chief Complaint  Procedure (Right shoulder injection )    Subjective        Man Gill presents to [facility]  Pain  This is a chronic problem. The current episode started more than 1 month ago. The problem has been unchanged. Associated symptoms comments: Shoulder pain. He has tried acetaminophen, NSAIDs and rest for the symptoms. The treatment provided mild relief.     Objective   Vital Signs:  /90 (BP Location: Left arm, Patient Position: Sitting, Cuff Size: Adult)   Pulse 92   Temp 98.5 øF (36.9 øC) (Oral)   Ht 177.8 cm (70\")   Wt 88.9 kg (196 lb)   SpO2 97% Comment: room air  BMI 28.12 kg/mý   Estimated body mass index is 28.12 kg/mý as calculated from the following:    Height as of this encounter: 177.8 cm (70\").    Weight as of this encounter: 88.9 kg (196 lb).             Physical Exam  Cardiovascular:      Rate and Rhythm: Normal rate.   Pulmonary:      Effort: Pulmonary effort is normal.   Musculoskeletal:      Right shoulder: Crepitus present. No swelling. Normal range of motion. Normal pulse.   Skin:     General: Skin is warm and dry.   Neurological:      Mental Status: He is alert and oriented to person, place, and time.   Psychiatric:         Mood and Affect: Mood normal.      Result Review :            - Injection Tendon or Ligament    Date/Time: 8/30/2023 4:17 PM  Performed by: Juanita Ireland APRN  Authorized by: Juanita Ireland APRN   Preparation: Patient was prepped and draped in the usual sterile fashion.  Local anesthesia used: no    Anesthesia:  Local anesthesia used: no    Sedation:  Patient sedated: no    Patient tolerance: patient tolerated the procedure well with no immediate complications  Comments: Discussed procedure, alternatives, possible complications and answered all questions. Written consent was obtained. Patient was in sitting position. Right subacromial-Posterior injection performed. No complications. Patient ambulatory at " discharge.     Medications administered: 3 mL lidocaine 1 %; 40 mg triamcinolone acetonide 40 MG/ML          Assessment and Plan   Diagnoses and all orders for this visit:    1. Chronic right shoulder pain (Primary)  -     - Injection Tendon or Ligament             Follow Up   Return if symptoms worsen or fail to improve.  Patient was given instructions and counseling regarding his condition or for health maintenance advice. Please see specific information pulled into the AVS if appropriate.

## 2023-09-01 ENCOUNTER — TELEPHONE (OUTPATIENT)
Dept: FAMILY MEDICINE CLINIC | Age: 58
End: 2023-09-01
Payer: COMMERCIAL

## 2023-09-01 NOTE — TELEPHONE ENCOUNTER
----- Message from Jackie Hairston LPN sent at 7/17/2023 10:28 AM EDT -----  TICKLE fingerstick A1c 9/1/2023

## 2023-09-27 ENCOUNTER — HOSPITAL ENCOUNTER (OUTPATIENT)
Dept: MRI IMAGING | Facility: HOSPITAL | Age: 58
Discharge: HOME | End: 2023-09-27
Payer: COMMERCIAL

## 2023-10-03 ENCOUNTER — OFFICE VISIT (OUTPATIENT)
Dept: PULMONOLOGY | Age: 58
End: 2023-10-03

## 2023-10-03 VITALS
OXYGEN SATURATION: 97 % | WEIGHT: 211.42 LBS | RESPIRATION RATE: 16 BRPM | HEIGHT: 65 IN | DIASTOLIC BLOOD PRESSURE: 72 MMHG | BODY MASS INDEX: 35.22 KG/M2 | SYSTOLIC BLOOD PRESSURE: 124 MMHG | HEART RATE: 89 BPM

## 2023-10-03 DIAGNOSIS — G47.31 COMPLEX SLEEP APNEA SYNDROME: ICD-10-CM

## 2023-10-03 DIAGNOSIS — G47.33 OSA (OBSTRUCTIVE SLEEP APNEA): Primary | ICD-10-CM

## 2023-10-03 PROCEDURE — 99213 OFFICE O/P EST LOW 20 MIN: CPT | Performed by: NURSE PRACTITIONER

## 2023-10-04 ENCOUNTER — TELEPHONE (OUTPATIENT)
Dept: FAMILY MEDICINE CLINIC | Age: 58
End: 2023-10-04

## 2023-10-04 NOTE — TELEPHONE ENCOUNTER
I will not be able to contact his insurance company.  If he is having ongoing difficulty, please place referral order to orthopedics for evaluation and additional guidance.  Thanks.

## 2023-10-04 NOTE — TELEPHONE ENCOUNTER
"  Caller: Man Gill \"Gera\"    Relationship: Self    Best call back number: [phone]       What was the call regarding:        THE PATIENT IS REQUESTING  PCP TO CALL THE INSURANCE COMPANY. T HE SAID HE HAS BEEN DENIED FOR A MRI  2 TIMES. HE SAID THEY ADVISED THAT HE  IS ELIGIBLE FOR THE MRI FOR HIS RIGHT SHOULDER. HE SAID HE HAS MET HIS DEDUCTIBLE AND THEY THINK IT MAY BE DUE TO HOW IT IS WORDED OR CODED. THEY ADVISE THE PATIENT TO HAVE HIS PCP CALL THEM.     PLEASE ADVISE       RAZA AMADOR     [phone]  "

## 2023-10-09 ENCOUNTER — TELEPHONE (OUTPATIENT)
Dept: FAMILY MEDICINE CLINIC | Age: 58
End: 2023-10-09

## 2023-10-09 NOTE — TELEPHONE ENCOUNTER
"  Caller: Man Gill \"Gera\"    Relationship: Self    Best call back number: [phone]    What was the call regarding: PATIENT STATES HE WANTS PRAVASTATIN REMOVED FROM HIS CHART ENTIRELY.   "

## 2023-10-30 ENCOUNTER — TELEPHONE (OUTPATIENT)
Dept: PULMONOLOGY | Age: 58
End: 2023-10-30

## 2023-10-30 DIAGNOSIS — G47.31 COMPLEX SLEEP APNEA SYNDROME: Primary | ICD-10-CM

## 2023-11-18 DIAGNOSIS — F51.01 PRIMARY INSOMNIA: ICD-10-CM

## 2023-11-20 RX ORDER — ZOLPIDEM TARTRATE 10 MG/1
10 TABLET ORAL NIGHTLY
Qty: 30 TABLET | Refills: 0 | Status: SHIPPED | OUTPATIENT
Start: 2023-11-20

## 2023-11-20 RX ORDER — SULINDAC 200 MG/1
200 TABLET ORAL 2 TIMES DAILY
Qty: 180 TABLET | Refills: 0 | Status: SHIPPED | OUTPATIENT
Start: 2023-11-20

## 2023-11-22 ENCOUNTER — TELEPHONE (OUTPATIENT)
Dept: FAMILY MEDICINE | Age: 58
End: 2023-11-22

## 2023-12-05 ENCOUNTER — TELEPHONE (OUTPATIENT)
Dept: ELECTROPHYSIOLOGY | Age: 58
End: 2023-12-05

## 2023-12-08 ENCOUNTER — LAB (OUTPATIENT)
Dept: LAB | Facility: HOSPITAL | Age: 58
End: 2023-12-08
Payer: COMMERCIAL

## 2023-12-08 ENCOUNTER — OFFICE VISIT (OUTPATIENT)
Dept: FAMILY MEDICINE CLINIC | Age: 58
End: 2023-12-08
Payer: COMMERCIAL

## 2023-12-08 VITALS
HEART RATE: 92 BPM | HEIGHT: 70 IN | DIASTOLIC BLOOD PRESSURE: 87 MMHG | SYSTOLIC BLOOD PRESSURE: 132 MMHG | BODY MASS INDEX: 27.86 KG/M2 | OXYGEN SATURATION: 94 % | WEIGHT: 194.6 LBS

## 2023-12-08 DIAGNOSIS — E11.65 TYPE 2 DIABETES MELLITUS WITH HYPERGLYCEMIA, WITHOUT LONG-TERM CURRENT USE OF INSULIN: ICD-10-CM

## 2023-12-08 DIAGNOSIS — Z00.00 PHYSICAL EXAM: Primary | ICD-10-CM

## 2023-12-08 DIAGNOSIS — E78.2 MIXED HYPERLIPIDEMIA: ICD-10-CM

## 2023-12-08 DIAGNOSIS — M1A.0720 IDIOPATHIC CHRONIC GOUT OF LEFT FOOT WITHOUT TOPHUS: ICD-10-CM

## 2023-12-08 DIAGNOSIS — I10 ESSENTIAL HYPERTENSION: ICD-10-CM

## 2023-12-08 DIAGNOSIS — J01.00 ACUTE NON-RECURRENT MAXILLARY SINUSITIS: ICD-10-CM

## 2023-12-08 DIAGNOSIS — I25.10 CORONARY ARTERY DISEASE INVOLVING NATIVE CORONARY ARTERY OF NATIVE HEART WITHOUT ANGINA PECTORIS: ICD-10-CM

## 2023-12-08 DIAGNOSIS — Z00.00 PHYSICAL EXAM: ICD-10-CM

## 2023-12-08 LAB
ALBUMIN SERPL-MCNC: 4.5 G/DL (ref 3.5–5.2)
ALBUMIN/GLOB SERPL: 1.9 G/DL
ALP SERPL-CCNC: 68 U/L (ref 39–117)
ALT SERPL W P-5'-P-CCNC: 44 U/L (ref 1–41)
ANION GAP SERPL CALCULATED.3IONS-SCNC: 14.1 MMOL/L (ref 5–15)
AST SERPL-CCNC: 34 U/L (ref 1–40)
BILIRUB SERPL-MCNC: 0.3 MG/DL (ref 0–1.2)
BUN SERPL-MCNC: 16 MG/DL (ref 8–23)
BUN/CREAT SERPL: 16.7 (ref 7–25)
CALCIUM SPEC-SCNC: 9.3 MG/DL (ref 8.6–10.5)
CHLORIDE SERPL-SCNC: 101 MMOL/L (ref 98–107)
CO2 SERPL-SCNC: 21.9 MMOL/L (ref 22–29)
CREAT SERPL-MCNC: 0.96 MG/DL (ref 0.76–1.27)
DEPRECATED RDW RBC AUTO: 40.5 FL (ref 37–54)
EGFRCR SERPLBLD CKD-EPI 2021: 90.5 ML/MIN/1.73
ERYTHROCYTE [DISTWIDTH] IN BLOOD BY AUTOMATED COUNT: 13.1 % (ref 12.3–15.4)
GLOBULIN UR ELPH-MCNC: 2.4 GM/DL
GLUCOSE SERPL-MCNC: 187 MG/DL (ref 65–99)
HBA1C MFR BLD: 8.5 % (ref 4.8–5.6)
HCT VFR BLD AUTO: 42.1 % (ref 37.5–51)
HGB BLD-MCNC: 14.5 G/DL (ref 13–17.7)
MCH RBC QN AUTO: 29.8 PG (ref 26.6–33)
MCHC RBC AUTO-ENTMCNC: 34.4 G/DL (ref 31.5–35.7)
MCV RBC AUTO: 86.6 FL (ref 79–97)
PLATELET # BLD AUTO: 182 10*3/MM3 (ref 140–450)
PMV BLD AUTO: 11.8 FL (ref 6–12)
POTASSIUM SERPL-SCNC: 4 MMOL/L (ref 3.5–5.2)
PROT SERPL-MCNC: 6.9 G/DL (ref 6–8.5)
RBC # BLD AUTO: 4.86 10*6/MM3 (ref 4.14–5.8)
SODIUM SERPL-SCNC: 137 MMOL/L (ref 136–145)
TSH SERPL DL<=0.05 MIU/L-ACNC: 1.01 UIU/ML (ref 0.27–4.2)
URATE SERPL-MCNC: 4.1 MG/DL (ref 3.4–7)
WBC NRBC COR # BLD AUTO: 5.86 10*3/MM3 (ref 3.4–10.8)

## 2023-12-08 PROCEDURE — 36415 COLL VENOUS BLD VENIPUNCTURE: CPT

## 2023-12-08 PROCEDURE — 99396 PREV VISIT EST AGE 40-64: CPT | Performed by: FAMILY MEDICINE

## 2023-12-08 PROCEDURE — 83036 HEMOGLOBIN GLYCOSYLATED A1C: CPT

## 2023-12-08 PROCEDURE — 84550 ASSAY OF BLOOD/URIC ACID: CPT

## 2023-12-08 PROCEDURE — 80050 GENERAL HEALTH PANEL: CPT

## 2023-12-08 RX ORDER — LOSARTAN POTASSIUM 25 MG/1
25 TABLET ORAL DAILY
Qty: 90 TABLET | Refills: 3 | Status: SHIPPED | OUTPATIENT
Start: 2023-12-08

## 2023-12-08 RX ORDER — ROSUVASTATIN CALCIUM 20 MG/1
20 TABLET, COATED ORAL NIGHTLY
Qty: 90 TABLET | Refills: 3 | Status: SHIPPED | OUTPATIENT
Start: 2023-12-08

## 2023-12-08 RX ORDER — METFORMIN HYDROCHLORIDE 500 MG/1
1000 TABLET, EXTENDED RELEASE ORAL
Qty: 180 TABLET | Refills: 3 | Status: SHIPPED | OUTPATIENT
Start: 2023-12-08

## 2023-12-08 RX ORDER — GLIPIZIDE 5 MG/1
5 TABLET ORAL
Qty: 180 TABLET | Refills: 3 | Status: SHIPPED | OUTPATIENT
Start: 2023-12-08

## 2023-12-08 RX ORDER — ALLOPURINOL 100 MG/1
200 TABLET ORAL DAILY
Qty: 180 TABLET | Refills: 3 | Status: SHIPPED | OUTPATIENT
Start: 2023-12-08

## 2023-12-08 RX ORDER — AMOXICILLIN AND CLAVULANATE POTASSIUM 875; 125 MG/1; MG/1
1 TABLET, FILM COATED ORAL 2 TIMES DAILY
Qty: 20 TABLET | Refills: 0 | Status: SHIPPED | OUTPATIENT
Start: 2023-12-08

## 2023-12-08 NOTE — PROGRESS NOTES
Man Gill presents to [facility].    Chief Complaint:  Annual physical, diabetes, blood pressure, cholesterol, CAD    Subjective   History of Present Illness:  Gera is being seen today for annual physical and follow-up.  He is 58 years old and works for [employer] where he has been for about 18 years.  He has not smoked for a long time.  He is exposed somewhat to secondhand smoke though.  Gera is not currently drinking alcohol.  He had PSA earlier this year that was normal.    Gera also has type 2 diabetes for which he currently takes glipizide and metformin.  Gera states that he sees the sugar run around 150.  His most recent A1c was good though.    He also has hypertension, elevated cholesterol, gout, and known coronary artery disease.  He remains on treatment for these issues.    Review of Systems:  Review of Systems   Constitutional:  Negative for chills and fever.   Respiratory:  Negative for cough and shortness of breath.    Cardiovascular:  Negative for chest pain and palpitations.   Gastrointestinal:  Negative for abdominal pain, nausea and vomiting.        Objective   Medical History:  Past Medical History:    Coronary artery disease    Essential hypertension    Gout    Kidney stones    Mixed hyperlipidemia    STEMI (ST elevation myocardial infarction)    6/1/23 3 STENTS PLACED    Type 2 diabetes mellitus    AVERAGE 'S     Past Surgical History:    CARDIAC CATHETERIZATION    Procedure: Left Heart Cath;  Surgeon: Jin Harrison MD;  Location: [facility] INVASIVE LOCATION;  Service: Cardiology;  Laterality: N/A;    CARDIAC CATHETERIZATION    Procedure: Percutaneous Coronary Intervention;  Surgeon: Jin Harrison MD;  Location: [facility] INVASIVE LOCATION;  Service: Cardiology;  Laterality: N/A;    CARDIAC CATHETERIZATION    Procedure: Left Heart Cath with possible coronary angioplasty;  Surgeon: Jin Harrison MD;  Location: [facility] INVASIVE  LOCATION;  Service: Cardiology;  Laterality: N/A;    COLONOSCOPY    Normal      Family History   Problem Relation Age of Onset    Hypertension Mother     Coronary artery disease Father     Hypertension Father     Heart disease Father     Coronary artery disease Brother      Social History     Tobacco Use    Smoking status: Former     Packs/day: 1.00     Years: 20.00     Additional pack years: 0.00     Total pack years: 20.00     Types: Cigarettes     Quit date: 1996     Years since quittin.9    Smokeless tobacco: Never   Substance Use Topics    Alcohol use: Not Currently       Health Maintenance Due   Topic Date Due    ZOSTER VACCINE (1 of 2) Never done    DIABETIC EYE EXAM  Never done    TDAP/TD VACCINES (3 - Td or Tdap) 08/10/2022    HEMOGLOBIN A1C  2023        Immunization History   Administered Date(s) Administered    Td (TDVAX) 2004    Tdap 08/10/2012       No Known Allergies     Medications:  Current Outpatient Medications on File Prior to Visit   Medication Sig    Accu-Chek FastClix Lancets misc USE TO check blood sugar EVERY DAY    albuterol sulfate  (90 Base) MCG/ACT inhaler Inhale 2 puffs Every 4 (Four) Hours As Needed for Wheezing or Shortness of Air (cough).    aspirin 81 MG EC tablet Take 1 tablet by mouth Daily.    glucose blood (Accu-Chek Guide) test strip USE TO check blood sugar EVERY DAY    metoprolol tartrate (LOPRESSOR) 25 MG tablet Take 1 tablet by mouth Every 12 (Twelve) Hours.    nitroglycerin (NITROSTAT) 0.4 MG SL tablet Place 1 tablet under the tongue Every 5 (Five) Minutes As Needed for Chest Pain. Take no more than 3 doses in 15 minutes.    ticagrelor (BRILINTA) 90 MG tablet tablet Take 1 tablet by mouth 2 (Two) Times a Day.    [DISCONTINUED] allopurinol (ZYLOPRIM) 100 MG tablet Take 2 tablets by mouth Daily.    [DISCONTINUED] glipizide (GLUCOTROL) 5 MG tablet Take 1 tablet by mouth 2 (Two) Times a Day Before Meals.    [DISCONTINUED] losartan (COZAAR) 25 MG  "tablet Take 1 tablet by mouth Daily.    [DISCONTINUED] metFORMIN ER (GLUCOPHAGE-XR) 500 MG 24 hr tablet Take 2 tablets by mouth Daily With Dinner.    [DISCONTINUED] rosuvastatin (CRESTOR) 20 MG tablet Take 1 tablet by mouth Every Night.     No current facility-administered medications on file prior to visit.       Vital Signs:   /87 (BP Location: Right arm, Patient Position: Sitting, Cuff Size: Adult)   Pulse 92   Ht 177.8 cm (70\")   Wt 88.3 kg (194 lb 9.6 oz)   SpO2 94%   BMI 27.92 kg/m²       Physical Exam:  Physical Exam  Vitals and nursing note reviewed.   Constitutional:       General: He is not in acute distress.     Appearance: He is not ill-appearing.   HENT:      Right Ear: Tympanic membrane and ear canal normal.      Left Ear: Tympanic membrane and ear canal normal.      Mouth/Throat:      Mouth: Mucous membranes are moist.      Comments: Pharynx appears normal  Eyes:      Extraocular Movements: Extraocular movements intact.      Pupils: Pupils are equal, round, and reactive to light.   Neck:      Thyroid: No thyromegaly.   Cardiovascular:      Rate and Rhythm: Normal rate and regular rhythm.      Pulses:           Dorsalis pedis pulses are 2+ on the right side and 2+ on the left side.      Heart sounds: No murmur heard.  Pulmonary:      Effort: Pulmonary effort is normal.      Breath sounds: Normal breath sounds.   Abdominal:      General: There is no distension.      Palpations: Abdomen is soft. There is no mass.      Tenderness: There is no abdominal tenderness.   Musculoskeletal:      Cervical back: Normal range of motion.   Feet:      Right foot:      Protective Sensation: 3 sites tested.  3 sites sensed.      Skin integrity: Skin integrity normal.      Left foot:      Protective Sensation: 3 sites tested.  3 sites sensed.      Skin integrity: Skin integrity normal.   Skin:     Findings: No lesion or rash.   Neurological:      General: No focal deficit present.      Mental Status: He is " oriented to person, place, and time.      Cranial Nerves: No cranial nerve deficit.   Psychiatric:         Mood and Affect: Mood normal.         Result Review   The following data was reviewed by Vadim Mishra MD on 12/08/2023.  Lab Results   Component Value Date    WBC 6.18 07/07/2023    HGB 14.2 07/07/2023    HCT 41.7 07/07/2023    MCV 85.6 07/07/2023     07/07/2023     Lab Results   Component Value Date    GLUCOSE 170 (H) 07/15/2023    BUN 17 07/15/2023    CREATININE 0.97 07/15/2023     07/15/2023    K 4.4 07/15/2023     07/15/2023    CO2 24.0 07/15/2023    CALCIUM 9.4 07/15/2023    PROTEINTOT 6.7 07/15/2023    ALBUMIN 4.6 07/15/2023    ALT 49 (H) 07/15/2023    AST 36 07/15/2023    ALKPHOS 70 07/15/2023    BILITOT 0.4 07/15/2023    EGFR 89.9 07/15/2023    GLOB 2.1 07/15/2023    AGRATIO 2.2 07/15/2023    BCR 17.5 07/15/2023    ANIONGAP 10.0 07/15/2023      Lab Results   Component Value Date    CHOL 113 07/15/2023    CHLPL 151 04/03/2021    TRIG 139 07/15/2023    HDL 39 (L) 07/15/2023    LDL 50 07/15/2023     Lab Results   Component Value Date    TSH 2.350 11/21/2022     Lab Results   Component Value Date    HGBA1C 7.30 (H) 06/01/2023     Lab Results   Component Value Date    PSA 0.430 07/15/2023    PSA 0.444 05/17/2022    PSA 0.51 04/03/2021          Assessment and Plan:   Today, we have reviewed his care.  Overall, Gera seems well today.  Regarding the annual physical, he declines referral for colonoscopy or other colon cancer screening.  We will continue to ask him about this.  He also declines routine vaccines today.    Regarding his usual care, we will update refills and medications as noted below.  It remains an open question whether to consider resuming Jardiance or other treatment for type 2 diabetes.  Tentative follow-up will be again in about 6 months.  He did complain of some ongoing sinus symptoms with nasal discharge.  We will cover him with antibiotic.    Diagnoses and all  orders for this visit:    1. Physical exam (Primary)  -     Comprehensive Metabolic Panel; Future  -     TSH; Future  -     Hemoglobin A1c; Future  -     CBC (No Diff); Future    2. Type 2 diabetes mellitus with hyperglycemia, without long-term current use of insulin  -     Comprehensive Metabolic Panel; Future  -     TSH; Future  -     Hemoglobin A1c; Future  -     glipizide (GLUCOTROL) 5 MG tablet; Take 1 tablet by mouth 2 (Two) Times a Day Before Meals.  Dispense: 180 tablet; Refill: 3  -     metFORMIN ER (GLUCOPHAGE-XR) 500 MG 24 hr tablet; Take 2 tablets by mouth Daily With Dinner.  Dispense: 180 tablet; Refill: 3    3. Essential hypertension  -     Comprehensive Metabolic Panel; Future  -     losartan (COZAAR) 25 MG tablet; Take 1 tablet by mouth Daily.  Dispense: 90 tablet; Refill: 3    4. Mixed hyperlipidemia  -     Comprehensive Metabolic Panel; Future  -     TSH; Future  -     rosuvastatin (CRESTOR) 20 MG tablet; Take 1 tablet by mouth Every Night.  Dispense: 90 tablet; Refill: 3    5. Coronary artery disease involving native coronary artery of native heart without angina pectoris  -     Ambulatory Referral to Cardiology  -     CBC (No Diff); Future  -     rosuvastatin (CRESTOR) 20 MG tablet; Take 1 tablet by mouth Every Night.  Dispense: 90 tablet; Refill: 3    6. Acute non-recurrent maxillary sinusitis  -     amoxicillin-clavulanate (AUGMENTIN) 875-125 MG per tablet; Take 1 tablet by mouth 2 (Two) Times a Day.  Dispense: 20 tablet; Refill: 0    7. Idiopathic chronic gout of left foot without tophus  -     Uric Acid; Future  -     allopurinol (ZYLOPRIM) 100 MG tablet; Take 2 tablets by mouth Daily.  Dispense: 180 tablet; Refill: 3    Follow Up  Return in about 6 months (around 6/8/2024) for Recheck.  Patient was given instructions and counseling regarding his condition or for health maintenance advice. Please see specific information pulled into the AVS if appropriate.

## 2023-12-12 DIAGNOSIS — R00.1 BRADYCARDIA: Primary | ICD-10-CM

## 2023-12-18 DIAGNOSIS — J01.00 ACUTE NON-RECURRENT MAXILLARY SINUSITIS: ICD-10-CM

## 2023-12-19 ENCOUNTER — PROCEDURE VISIT (OUTPATIENT)
Dept: FAMILY MEDICINE CLINIC | Age: 58
End: 2023-12-19
Payer: COMMERCIAL

## 2023-12-19 VITALS
SYSTOLIC BLOOD PRESSURE: 148 MMHG | OXYGEN SATURATION: 98 % | WEIGHT: 197 LBS | BODY MASS INDEX: 28.2 KG/M2 | TEMPERATURE: 98.8 F | DIASTOLIC BLOOD PRESSURE: 86 MMHG | HEIGHT: 70 IN | HEART RATE: 100 BPM

## 2023-12-19 DIAGNOSIS — G89.29 CHRONIC RIGHT SHOULDER PAIN: Primary | ICD-10-CM

## 2023-12-19 DIAGNOSIS — M25.511 CHRONIC RIGHT SHOULDER PAIN: Primary | ICD-10-CM

## 2023-12-19 RX ORDER — AMOXICILLIN AND CLAVULANATE POTASSIUM 875; 125 MG/1; MG/1
1 TABLET, FILM COATED ORAL 2 TIMES DAILY
Qty: 20 TABLET | Refills: 0 | Status: SHIPPED | OUTPATIENT
Start: 2023-12-19 | End: 2023-12-19

## 2023-12-19 RX ORDER — TRIAMCINOLONE ACETONIDE 40 MG/ML
40 INJECTION, SUSPENSION INTRA-ARTICULAR; INTRAMUSCULAR
Status: COMPLETED | OUTPATIENT
Start: 2023-12-19 | End: 2023-12-19

## 2023-12-19 RX ORDER — LIDOCAINE HYDROCHLORIDE 10 MG/ML
3 INJECTION, SOLUTION INFILTRATION; PERINEURAL
Status: COMPLETED | OUTPATIENT
Start: 2023-12-19 | End: 2023-12-19

## 2023-12-19 RX ADMIN — LIDOCAINE HYDROCHLORIDE 3 ML: 10 INJECTION, SOLUTION INFILTRATION; PERINEURAL at 15:17

## 2023-12-19 RX ADMIN — TRIAMCINOLONE ACETONIDE 40 MG: 40 INJECTION, SUSPENSION INTRA-ARTICULAR; INTRAMUSCULAR at 15:17

## 2023-12-19 NOTE — TELEPHONE ENCOUNTER
I sent a single refill on this for him.  If he does not see improvement in the next week, he should be seen again.  Thanks.

## 2023-12-19 NOTE — PROGRESS NOTES
"Chief Complaint  Procedure (right shoulder injection )    Subjective    Patient reports last injection helped with pain until about 3 weeks ago. No injury. Pain is worse at night and after he has been holding his arms above his head, has to take other arm and help get arm back in position.         Man Gill presents to [facility]  History of Present Illness    Objective   Vital Signs:  /86 (BP Location: Left arm, Patient Position: Sitting, Cuff Size: Adult)   Pulse 100   Temp 98.8 °F (37.1 °C) (Temporal)   Ht 177.8 cm (70\")   Wt 89.4 kg (197 lb)   SpO2 98% Comment: room air  BMI 28.27 kg/m²   Estimated body mass index is 28.27 kg/m² as calculated from the following:    Height as of this encounter: 177.8 cm (70\").    Weight as of this encounter: 89.4 kg (197 lb).               Physical Exam  Cardiovascular:      Rate and Rhythm: Normal rate.   Pulmonary:      Effort: Pulmonary effort is normal.   Musculoskeletal:      Right shoulder: Crepitus present. No swelling or bony tenderness. Normal range of motion. Normal pulse.   Skin:     General: Skin is warm and dry.   Neurological:      Mental Status: He is alert and oriented to person, place, and time.   Psychiatric:         Mood and Affect: Mood normal.        Result Review :            - Injection Tendon or Ligament    Date/Time: 12/19/2023 3:17 PM    Performed by: Juanita Ireland APRN  Authorized by: Juanita Ireland APRN  Preparation: Patient was prepped and draped in the usual sterile fashion.  Local anesthesia used: no    Anesthesia:  Local anesthesia used: no    Sedation:  Patient sedated: no    Patient tolerance: patient tolerated the procedure well with no immediate complications  Comments: Discussed procedure, alternatives, possible complications and answered all questions. Written consent was obtained. Patient was in sitting position. Right subacromial-Posterior shoulder injection performed. No " complications. Patient ambulatory at discharge.     Medications administered: 3 mL lidocaine 1 %; 40 mg triamcinolone acetonide 40 MG/ML            Assessment and Plan   Diagnoses and all orders for this visit:    1. Chronic right shoulder pain (Primary)  -     - Injection Tendon or Ligament             Follow Up   Return if symptoms worsen or fail to improve.  Patient was given instructions and counseling regarding his condition or for health maintenance advice. Please see specific information pulled into the AVS if appropriate.

## 2023-12-21 ENCOUNTER — APPOINTMENT (OUTPATIENT)
Dept: ELECTROPHYSIOLOGY | Age: 58
End: 2023-12-21
Attending: INTERNAL MEDICINE

## 2023-12-28 ENCOUNTER — OFFICE VISIT (OUTPATIENT)
Dept: CARDIOLOGY | Facility: CLINIC | Age: 58
End: 2023-12-28
Payer: COMMERCIAL

## 2023-12-28 VITALS
DIASTOLIC BLOOD PRESSURE: 97 MMHG | HEART RATE: 61 BPM | BODY MASS INDEX: 27.92 KG/M2 | HEIGHT: 70 IN | SYSTOLIC BLOOD PRESSURE: 147 MMHG | WEIGHT: 195 LBS

## 2023-12-28 DIAGNOSIS — I25.10 CAD S/P PERCUTANEOUS CORONARY ANGIOPLASTY: Primary | ICD-10-CM

## 2023-12-28 DIAGNOSIS — E78.2 MIXED HYPERLIPIDEMIA: ICD-10-CM

## 2023-12-28 DIAGNOSIS — I10 PRIMARY HYPERTENSION: ICD-10-CM

## 2023-12-28 DIAGNOSIS — Z98.61 CAD S/P PERCUTANEOUS CORONARY ANGIOPLASTY: Primary | ICD-10-CM

## 2023-12-28 PROCEDURE — 99214 OFFICE O/P EST MOD 30 MIN: CPT

## 2023-12-28 NOTE — PROGRESS NOTES
Chief Complaint  ST elevation myocardial infarction (STEMI), unspecified art; Coronary Artery Disease; and Chest Pain    Subjective        History of Present Illness  Man Gill presents to [facility] for follow up.  Mr. Gill is a 58-year-old male with past medical history outlined below, significant for hypertension, hyperlipidemia, type 2 diabetes, previous STEMI with PCI to the RCA in June 2023.  He had subsequent left heart catheterization for IFR of the LAD which was negative.  He is doing very well.  He notes no chest pain or discomfort.  He has no shortness of breath.  He denies any dizziness, lightheadedness, syncope or presyncope.  He is compliant with his dual antiplatelet therapy.      Past Medical History:   Diagnosis Date    Coronary artery disease     Essential hypertension     Gout     Kidney stones     Mixed hyperlipidemia     STEMI (ST elevation myocardial infarction)     6/1/23 3 STENTS PLACED    Type 2 diabetes mellitus     AVERAGE 'S       ALLERGY  No Known Allergies     Past Surgical History:   Procedure Laterality Date    CARDIAC CATHETERIZATION N/A 06/01/2023    Procedure: Left Heart Cath;  Surgeon: Jin Harrison MD;  [facility] CATH INVASIVE LOCATION;  Service: Cardiology;  Laterality: N/A;    CARDIAC CATHETERIZATION N/A 06/01/2023    Procedure: Percutaneous Coronary Intervention;  Surgeon: Jin Harrison MD;  Location: [facility] CATH INVASIVE LOCATION;  Service: Cardiology;  Laterality: N/A;    CARDIAC CATHETERIZATION N/A 07/07/2023    Procedure: Left Heart Cath with possible coronary angioplasty;  Surgeon: Jin Harrison MD;  Location: [facility] CATH INVASIVE LOCATION;  Service: Cardiology;  Laterality: N/A;    COLONOSCOPY  11/15/2013    Normal        Social History     Socioeconomic History    Marital status:      Spouse name: Mariely    Number of children: 1   Tobacco Use    Smoking status: Former     Packs/day:  1.00     Years: 20.00     Additional pack years: 0.00     Total pack years: 20.00     Types: Cigarettes     Quit date: 1996     Years since quittin.0    Smokeless tobacco: Never   Vaping Use    Vaping Use: Never used   Substance and Sexual Activity    Alcohol use: Not Currently    Drug use: Never    Sexual activity: Defer       Family History   Problem Relation Age of Onset    Hypertension Mother     Coronary artery disease Father     Hypertension Father     Heart disease Father     Coronary artery disease Brother         Current Outpatient Medications on File Prior to Visit   Medication Sig    Accu-Chek FastClix Lancets misc USE TO check blood sugar EVERY DAY    albuterol sulfate  (90 Base) MCG/ACT inhaler Inhale 2 puffs Every 4 (Four) Hours As Needed for Wheezing or Shortness of Air (cough).    allopurinol (ZYLOPRIM) 100 MG tablet Take 2 tablets by mouth Daily.    aspirin 81 MG EC tablet Take 1 tablet by mouth Daily.    empagliflozin (JARDIANCE) 10 MG tablet tablet Take 1 tablet by mouth Daily.    glipizide (GLUCOTROL) 5 MG tablet Take 1 tablet by mouth 2 (Two) Times a Day Before Meals.    glucose blood (Accu-Chek Guide) test strip USE TO check blood sugar EVERY DAY    losartan (COZAAR) 25 MG tablet Take 1 tablet by mouth Daily.    metFORMIN ER (GLUCOPHAGE-XR) 500 MG 24 hr tablet Take 2 tablets by mouth Daily With Dinner.    metoprolol tartrate (LOPRESSOR) 25 MG tablet Take 1 tablet by mouth Every 12 (Twelve) Hours.    nitroglycerin (NITROSTAT) 0.4 MG SL tablet Place 1 tablet under the tongue Every 5 (Five) Minutes As Needed for Chest Pain. Take no more than 3 doses in 15 minutes.    rosuvastatin (CRESTOR) 20 MG tablet Take 1 tablet by mouth Every Night.    [DISCONTINUED] ticagrelor (BRILINTA) 90 MG tablet tablet Take 1 tablet by mouth 2 (Two) Times a Day.     No current facility-administered medications on file prior to visit.       Objective   Vitals:    23 0926   BP: 147/97   Pulse: 61  "  Weight: 88.5 kg (195 lb)   Height: 177.8 cm (70\")       Physical Exam  Constitutional:       General: He is awake. He is not in acute distress.     Appearance: Normal appearance.   HENT:      Head: Normocephalic.      Nose: Nose normal. No congestion.   Eyes:      Extraocular Movements: Extraocular movements intact.      Conjunctiva/sclera: Conjunctivae normal.      Pupils: Pupils are equal, round, and reactive to light.   Neck:      Thyroid: No thyromegaly.      Vascular: No JVD.   Cardiovascular:      Rate and Rhythm: Normal rate and regular rhythm.      Chest Wall: PMI is not displaced.      Pulses: Normal pulses.      Heart sounds: Normal heart sounds, S1 normal and S2 normal. No murmur heard.     No friction rub. No gallop. No S3 or S4 sounds.   Pulmonary:      Effort: Pulmonary effort is normal.      Breath sounds: Normal breath sounds. No wheezing, rhonchi or rales.   Abdominal:      General: Bowel sounds are normal.      Palpations: Abdomen is soft.      Tenderness: There is no abdominal tenderness.   Musculoskeletal:      Cervical back: No tenderness.      Right lower leg: No edema.      Left lower leg: No edema.   Lymphadenopathy:      Cervical: No cervical adenopathy.   Skin:     General: Skin is warm and dry.      Capillary Refill: Capillary refill takes less than 2 seconds.      Coloration: Skin is not cyanotic.      Findings: No petechiae or rash.      Nails: There is no clubbing.   Neurological:      Mental Status: He is alert.   Psychiatric:         Mood and Affect: Mood normal.         Behavior: Behavior is cooperative.           Result Review     The following data was reviewed by GUANAKO Montoya on 12/28/23.    proBNP   Date Value Ref Range Status   06/01/2023 495.0 0.0 - 900.0 pg/mL Final     CMP          7/7/2023    08:37 7/15/2023    10:09 12/8/2023    16:58   CMP   Glucose 172  170  187    BUN 15  17  16    Creatinine 1.05  0.97  0.96    EGFR 81.8  89.9  90.5    Sodium 139  139  137 "    Potassium 4.7  4.4  4.0    Chloride 105  105  101    Calcium 9.2  9.4  9.3    Total Protein  6.7  6.9    Albumin  4.6  4.5    Globulin  2.1  2.4    Total Bilirubin  0.4  0.3    Alkaline Phosphatase  70  68    AST (SGOT)  36  34    ALT (SGPT)  49  44    Albumin/Globulin Ratio  2.2  1.9    BUN/Creatinine Ratio 14.3  17.5  16.7    Anion Gap 9.0  10.0  14.1      CBC w/diff          6/2/2023    04:35 7/7/2023    08:37 12/8/2023    16:58   CBC w/Diff   WBC 9.37  6.18  5.86    RBC 4.82  4.87  4.86    Hemoglobin 14.1  14.2  14.5    Hematocrit 42.2  41.7  42.1    MCV 87.6  85.6  86.6    MCH 29.3  29.2  29.8    MCHC 33.4  34.1  34.4    RDW 12.7  12.7  13.1    Platelets 189  179  182       Lipid Panel          2/27/2023    09:32 7/15/2023    10:09   Lipid Panel   Total Cholesterol 172  113    Triglycerides 248  139    HDL Cholesterol 35  39    VLDL Cholesterol 42  24    LDL Cholesterol  95  50    LDL/HDL Ratio 2.50  1.18        Results for orders placed during the hospital encounter of 06/01/23    Adult Transthoracic Echo Complete W/ Cont if Necessary Per Protocol    Interpretation Summary    Left ventricular ejection fraction appears to be 51 - 55%.  Inferior wall appears to be moving normally.    Left ventricular wall thickness is consistent with borderline concentric hypertrophy.    Left ventricular diastolic function is consistent with (grade I) impaired relaxation.    No significant valvular disease.           Procedures    Assessment & Plan  Diagnoses and all orders for this visit:    1. CAD S/P percutaneous coronary angioplasty (Primary)    2. Primary hypertension    3. Mixed hyperlipidemia    Other orders  -     ticagrelor (BRILINTA) 90 MG tablet tablet; Take 1 tablet by mouth 2 (Two) Times a Day.  Dispense: 180 tablet; Refill: 3    1.  Doing well with no angina or anginal-like symptoms.  Continue aspirin and Brilinta.  Brilinta can be discontinued in June 2024.  2.  Blood pressure is elevated in the office today  but he notes that his blood pressure runs good at home and is always elevated when he comes to the doctor's office.  Continue losartan 25 mg daily and metoprolol 25 mg twice daily.  3.  Continue rosuvastatin 20 mg daily.    Follow Up   Return in about 6 months (around 6/28/2024) for With Dr. Harrison.    Patient was given instructions and counseling regarding his condition or for health maintenance advice. Please see specific information pulled into the AVS if appropriate.     Mallory Arevalo, GUANAKO  12/28/23  09:51 EST    Dictated Utilizing Dragon Dictation

## 2024-01-02 ENCOUNTER — TELEPHONE (OUTPATIENT)
Dept: ELECTROPHYSIOLOGY | Age: 59
End: 2024-01-02

## 2024-01-04 ENCOUNTER — TELEPHONE (OUTPATIENT)
Dept: ELECTROPHYSIOLOGY | Age: 59
End: 2024-01-04

## 2024-01-08 ENCOUNTER — ANCILLARY PROCEDURE (OUTPATIENT)
Dept: ELECTROPHYSIOLOGY | Age: 59
End: 2024-01-08
Attending: INTERNAL MEDICINE

## 2024-01-08 ENCOUNTER — TELEPHONE (OUTPATIENT)
Dept: ELECTROPHYSIOLOGY | Age: 59
End: 2024-01-08

## 2024-01-08 DIAGNOSIS — R00.1 BRADYCARDIA: ICD-10-CM

## 2024-01-08 PROCEDURE — 93294 REM INTERROG EVL PM/LDLS PM: CPT | Performed by: INTERNAL MEDICINE

## 2024-01-08 PROCEDURE — 93296 REM INTERROG EVL PM/IDS: CPT | Performed by: INTERNAL MEDICINE

## 2024-01-09 ENCOUNTER — IMAGING SERVICES (OUTPATIENT)
Dept: GENERAL RADIOLOGY | Age: 59
End: 2024-01-09
Attending: PHYSICIAN ASSISTANT

## 2024-01-09 ENCOUNTER — OFFICE VISIT (OUTPATIENT)
Dept: FAMILY MEDICINE | Age: 59
End: 2024-01-09

## 2024-01-09 VITALS
OXYGEN SATURATION: 99 % | WEIGHT: 210 LBS | DIASTOLIC BLOOD PRESSURE: 85 MMHG | SYSTOLIC BLOOD PRESSURE: 135 MMHG | HEIGHT: 65 IN | TEMPERATURE: 98.7 F | RESPIRATION RATE: 18 BRPM | HEART RATE: 82 BPM | BODY MASS INDEX: 34.99 KG/M2

## 2024-01-09 DIAGNOSIS — G47.31 COMPLEX SLEEP APNEA SYNDROME: ICD-10-CM

## 2024-01-09 DIAGNOSIS — E78.2 MIXED HYPERLIPIDEMIA: ICD-10-CM
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
## 2024-01-09 DIAGNOSIS — J30.2 SEASONAL ALLERGIES: ICD-10-CM
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
## 2024-01-09 DIAGNOSIS — I49.5 SINOATRIAL NODE DYSFUNCTION (CMD): ICD-10-CM

## 2024-01-09 DIAGNOSIS — M25.512 ACUTE PAIN OF LEFT SHOULDER: ICD-10-CM

## 2024-01-09 DIAGNOSIS — R20.0 NUMBNESS AND TINGLING IN LEFT ARM: ICD-10-CM

## 2024-01-09 DIAGNOSIS — M54.2 NECK PAIN: ICD-10-CM

## 2024-01-09 DIAGNOSIS — Z95.0 PACEMAKER: ICD-10-CM

## 2024-01-09 DIAGNOSIS — R20.2 NUMBNESS AND TINGLING IN LEFT ARM: ICD-10-CM

## 2024-01-09 DIAGNOSIS — M62.838 NECK MUSCLE SPASM: ICD-10-CM

## 2024-01-09 PROCEDURE — 73030 X-RAY EXAM OF SHOULDER: CPT | Performed by: RADIOLOGY

## 2024-01-09 PROCEDURE — 99214 OFFICE O/P EST MOD 30 MIN: CPT | Performed by: PHYSICIAN ASSISTANT

## 2024-01-09 PROCEDURE — 72050 X-RAY EXAM NECK SPINE 4/5VWS: CPT | Performed by: RADIOLOGY

## 2024-01-09 RX ORDER — TIZANIDINE 2 MG/1
TABLET ORAL
Qty: 180 TABLET | Refills: 0 | Status: SHIPPED | OUTPATIENT
Start: 2024-01-09

## 2024-01-09 RX ORDER — TADALAFIL 20 MG/1
TABLET ORAL
Qty: 30 TABLET | Refills: 11 | Status: SHIPPED | OUTPATIENT
Start: 2024-01-09

## 2024-01-09 ASSESSMENT — ENCOUNTER SYMPTOMS
FATIGUE: 0
NUMBNESS: 1
BACK PAIN: 1
DIAPHORESIS: 0
COUGH: 0
NAUSEA: 0
FEVER: 0
HEADACHES: 0
SHORTNESS OF BREATH: 0
VOMITING: 0
DIZZINESS: 0
WHEEZING: 0
CHILLS: 0
DIARRHEA: 0

## 2024-01-11 ENCOUNTER — TELEPHONE (OUTPATIENT)
Dept: FAMILY MEDICINE CLINIC | Age: 59
End: 2024-01-11
Payer: COMMERCIAL

## 2024-01-11 NOTE — TELEPHONE ENCOUNTER
----- Message from Jackie Hairston LPN sent at 12/11/2023 10:34 AM EST -----  TICKLE to call him in 30 days.  Is he tolerating Jardiance okay?  How are his fasting blood sugars doing?

## 2024-01-16 ENCOUNTER — APPOINTMENT (OUTPATIENT)
Dept: ELECTROPHYSIOLOGY | Age: 59
End: 2024-01-16
Attending: INTERNAL MEDICINE

## 2024-01-29 ENCOUNTER — APPOINTMENT (OUTPATIENT)
Dept: PULMONOLOGY | Age: 59
End: 2024-01-29

## 2024-01-29 VITALS
HEIGHT: 65 IN | RESPIRATION RATE: 18 BRPM | SYSTOLIC BLOOD PRESSURE: 126 MMHG | WEIGHT: 211.53 LBS | BODY MASS INDEX: 35.24 KG/M2 | HEART RATE: 100 BPM | OXYGEN SATURATION: 96 % | DIASTOLIC BLOOD PRESSURE: 64 MMHG

## 2024-01-29 DIAGNOSIS — G47.31 COMPLEX SLEEP APNEA SYNDROME: Primary | ICD-10-CM

## 2024-01-29 PROCEDURE — 99213 OFFICE O/P EST LOW 20 MIN: CPT | Performed by: INTERNAL MEDICINE

## 2024-02-01 RX ORDER — TIZANIDINE 2 MG/1
TABLET ORAL
Qty: 120 TABLET | Refills: 2 | Status: SHIPPED | OUTPATIENT
Start: 2024-02-01

## 2024-02-20 DIAGNOSIS — F51.01 PRIMARY INSOMNIA: ICD-10-CM

## 2024-02-20 RX ORDER — ZOLPIDEM TARTRATE 10 MG/1
10 TABLET ORAL NIGHTLY
Qty: 30 TABLET | Refills: 1 | Status: SHIPPED | OUTPATIENT
Start: 2024-02-20

## 2024-02-20 RX ORDER — SULINDAC 200 MG/1
TABLET ORAL
Qty: 180 TABLET | Refills: 1 | Status: SHIPPED | OUTPATIENT
Start: 2024-02-20

## 2024-02-23 ENCOUNTER — TELEPHONE (OUTPATIENT)
Dept: ELECTROPHYSIOLOGY | Age: 59
End: 2024-02-23

## 2024-03-05 ENCOUNTER — APPOINTMENT (OUTPATIENT)
Dept: ELECTROPHYSIOLOGY | Age: 59
End: 2024-03-05

## 2024-03-11 ENCOUNTER — TELEPHONE (OUTPATIENT)
Dept: FAMILY MEDICINE CLINIC | Age: 59
End: 2024-03-11
Payer: COMMERCIAL

## 2024-03-11 NOTE — TELEPHONE ENCOUNTER
----- Message from Jackie Hairston LPN sent at 12/11/2023 10:34 AM EST -----   TICKLE fingerstick A1c 90 days.

## 2024-04-01 ENCOUNTER — TELEPHONE (OUTPATIENT)
Dept: CARDIOLOGY | Facility: CLINIC | Age: 59
End: 2024-04-01
Payer: COMMERCIAL

## 2024-04-01 NOTE — TELEPHONE ENCOUNTER
Rx Refill Note  Requested Prescriptions     Pending Prescriptions Disp Refills    ticagrelor (BRILINTA) 90 MG tablet tablet 180 tablet 3     Sig: Take 1 tablet by mouth 2 (Two) Times a Day.        LAST OFFICE VISIT:   12/28/2023 Medication matches last office note    NEXT OFFICE VISIT:  6/28/2024                           Does this refill request meet protocol details for MA to approve:   [x] Yes   [] No

## 2024-04-01 NOTE — TELEPHONE ENCOUNTER
ROYA PT WIFE SYD.  PROVIDED HER BRILINTA PAP CONTACT INFORMATION AND WAYS TO APPLY.  SHE IS GOING TO HAVE ANGELA APPLY ONLINE AND COME BY THE [place] OFFICE FOR SAMPLES.

## 2024-04-01 NOTE — TELEPHONE ENCOUNTER
Caller: SYD ROBLES    Relationship: Emergency Contact    Best call back number: [phone]     Requested Prescriptions:   Requested Prescriptions     Pending Prescriptions Disp Refills    ticagrelor (BRILINTA) 90 MG tablet tablet 180 tablet 3     Sig: Take 1 tablet by mouth 2 (Two) Times a Day.        Pharmacy where request should be sent: [place], [place] - [address] [facility] [phone] [facility] [phone] FX     Last office visit with prescribing clinician: 12/28/2023   Last telemedicine visit with prescribing clinician: Visit date not found   Next office visit with prescribing clinician: Visit date not found     Additional details provided by patient: PLEASE VIEW ADDITIONAL TE REQUESTING COUPONS    Does the patient have less than a 3 day supply:  [x] Yes  [] No    Would you like a call back once the refill request has been completed: [] Yes [] No    If the office needs to give you a call back, can they leave a voicemail: [] Yes [] No    Sylvia Leonardo Rep   04/01/24 11:08 EDT

## 2024-04-01 NOTE — TELEPHONE ENCOUNTER
Caller: SYD ROBLES    Relationship: Emergency Contact    Best call back number: [phone]     What is the best time to reach you: ANYTIME    Who are you requesting to speak with (clinical staff, provider,  specific staff member): CLINICAL    Do you know the name of the person who called: SYD    What was the call regarding: PT IS NEEDING REFILL OF BRILINTA ( SENT IN OTHER TE), BUT IS NEEDING COUPONS FOR THE PRESCRIPTION. PLEASE CALL TO LET THEM KNOW IF ANY KIND OF ASSISTANCE COULD BE PROVIDED    Is it okay if the provider responds through MyChart: PLEASE CALL

## 2024-04-12 ENCOUNTER — APPOINTMENT (OUTPATIENT)
Dept: ELECTROPHYSIOLOGY | Age: 59
End: 2024-04-12

## 2024-04-12 VITALS
WEIGHT: 200 LBS | HEART RATE: 92 BPM | BODY MASS INDEX: 33.32 KG/M2 | HEIGHT: 65 IN | DIASTOLIC BLOOD PRESSURE: 80 MMHG | SYSTOLIC BLOOD PRESSURE: 140 MMHG

## 2024-04-12 DIAGNOSIS — R00.1 BRADYCARDIA: ICD-10-CM

## 2024-04-12 LAB
MDC_IDC_EPISODE_DTM: NORMAL
MDC_IDC_EPISODE_DURATION: 105 S
MDC_IDC_EPISODE_DURATION: 11 S
MDC_IDC_EPISODE_DURATION: 14 S
MDC_IDC_EPISODE_DURATION: 15 S
MDC_IDC_EPISODE_DURATION: 33 S
MDC_IDC_EPISODE_DURATION: 35 S
MDC_IDC_EPISODE_DURATION: 36 S
MDC_IDC_EPISODE_DURATION: 41 S
MDC_IDC_EPISODE_DURATION: 41 S
MDC_IDC_EPISODE_DURATION: 43 S
MDC_IDC_EPISODE_DURATION: 45 S
MDC_IDC_EPISODE_DURATION: 47 S
MDC_IDC_EPISODE_DURATION: 58 S
MDC_IDC_EPISODE_DURATION: 59 S
MDC_IDC_EPISODE_DURATION: 6 S
MDC_IDC_EPISODE_DURATION: 61 S
MDC_IDC_EPISODE_DURATION: 65 S
MDC_IDC_EPISODE_DURATION: 69 S
MDC_IDC_EPISODE_DURATION: 7 S
MDC_IDC_EPISODE_DURATION: 71 S
MDC_IDC_EPISODE_DURATION: 76 S
MDC_IDC_EPISODE_DURATION: 8 S
MDC_IDC_EPISODE_DURATION: 86 S
MDC_IDC_EPISODE_DURATION: 89 S
MDC_IDC_EPISODE_DURATION: 9 S
MDC_IDC_EPISODE_ID: 1033
MDC_IDC_EPISODE_ID: 1034
MDC_IDC_EPISODE_ID: 1035
MDC_IDC_EPISODE_ID: 1036
MDC_IDC_EPISODE_ID: 1037
MDC_IDC_EPISODE_ID: 1038
MDC_IDC_EPISODE_ID: 1039
MDC_IDC_EPISODE_ID: 1040
MDC_IDC_EPISODE_ID: 1041
MDC_IDC_EPISODE_ID: 1042
MDC_IDC_EPISODE_ID: 1043
MDC_IDC_EPISODE_ID: 1044
MDC_IDC_EPISODE_ID: 1045
MDC_IDC_EPISODE_ID: 1046
MDC_IDC_EPISODE_ID: 1047
MDC_IDC_EPISODE_ID: 1048
MDC_IDC_EPISODE_ID: 1049
MDC_IDC_EPISODE_ID: 1050
MDC_IDC_EPISODE_ID: 1051
MDC_IDC_EPISODE_ID: 1052
MDC_IDC_EPISODE_ID: 1053
MDC_IDC_EPISODE_ID: 1054
MDC_IDC_EPISODE_ID: 1055
MDC_IDC_EPISODE_ID: 1056
MDC_IDC_EPISODE_ID: 1057
MDC_IDC_EPISODE_TYPE: NORMAL
MDC_IDC_EPISODE_VENDOR_TYPE: NORMAL
MDC_IDC_MSMT_BATTERY_DTM: NORMAL
MDC_IDC_MSMT_BATTERY_REMAINING_LONGEVITY: 42 MO
MDC_IDC_MSMT_BATTERY_RRT_TRIGGER: 2.83
MDC_IDC_MSMT_BATTERY_STATUS: NORMAL
MDC_IDC_MSMT_BATTERY_VOLTAGE: 2.96 V
MDC_IDC_MSMT_LEADCHNL_RA_IMPEDANCE_VALUE: 418 OHM
MDC_IDC_MSMT_LEADCHNL_RA_IMPEDANCE_VALUE: 456 OHM
MDC_IDC_MSMT_LEADCHNL_RA_PACING_THRESHOLD_AMPLITUDE: 0.5 V
MDC_IDC_MSMT_LEADCHNL_RA_PACING_THRESHOLD_PULSEWIDTH: 0.4 MS
MDC_IDC_MSMT_LEADCHNL_RA_SENSING_INTR_AMPL: 2 MV
MDC_IDC_MSMT_LEADCHNL_RA_SENSING_INTR_AMPL: 2.75 MV
MDC_IDC_MSMT_LEADCHNL_RV_IMPEDANCE_VALUE: 380 OHM
MDC_IDC_MSMT_LEADCHNL_RV_IMPEDANCE_VALUE: 456 OHM
MDC_IDC_MSMT_LEADCHNL_RV_PACING_THRESHOLD_AMPLITUDE: 0.88 V
MDC_IDC_MSMT_LEADCHNL_RV_PACING_THRESHOLD_PULSEWIDTH: 0.4 MS
MDC_IDC_MSMT_LEADCHNL_RV_SENSING_INTR_AMPL: 3.88 MV
MDC_IDC_MSMT_LEADCHNL_RV_SENSING_INTR_AMPL: 7 MV
MDC_IDC_PG_IMPLANT_DTM: NORMAL
MDC_IDC_PG_MFG: NORMAL
MDC_IDC_PG_MODEL: NORMAL
MDC_IDC_PG_SERIAL: NORMAL
MDC_IDC_PG_TYPE: NORMAL
MDC_IDC_SESS_CLINIC_NAME: NORMAL
MDC_IDC_SESS_DTM: NORMAL
MDC_IDC_SESS_TYPE: NORMAL
MDC_IDC_SET_BRADY_AT_MODE_SWITCH_RATE: 162 {BEATS}/MIN
MDC_IDC_SET_BRADY_HYSTRATE: NORMAL
MDC_IDC_SET_BRADY_LOWRATE: 50 {BEATS}/MIN
MDC_IDC_SET_BRADY_MAX_SENSOR_RATE: 130 {BEATS}/MIN
MDC_IDC_SET_BRADY_MAX_TRACKING_RATE: 130 {BEATS}/MIN
MDC_IDC_SET_BRADY_MODE: NORMAL
MDC_IDC_SET_BRADY_PAV_DELAY_LOW: 180 MS
MDC_IDC_SET_BRADY_SAV_DELAY_LOW: 150 MS
MDC_IDC_SET_LEADCHNL_RA_PACING_AMPLITUDE: 1.5 V
MDC_IDC_SET_LEADCHNL_RA_PACING_ANODE_ELECTRODE_1: NORMAL
MDC_IDC_SET_LEADCHNL_RA_PACING_ANODE_LOCATION_1: NORMAL
MDC_IDC_SET_LEADCHNL_RA_PACING_CAPTURE_MODE: NORMAL
MDC_IDC_SET_LEADCHNL_RA_PACING_CATHODE_ELECTRODE_1: NORMAL
MDC_IDC_SET_LEADCHNL_RA_PACING_CATHODE_LOCATION_1: NORMAL
MDC_IDC_SET_LEADCHNL_RA_PACING_POLARITY: NORMAL
MDC_IDC_SET_LEADCHNL_RA_PACING_PULSEWIDTH: 0.4 MS
MDC_IDC_SET_LEADCHNL_RA_SENSING_ANODE_ELECTRODE_1: NORMAL
MDC_IDC_SET_LEADCHNL_RA_SENSING_ANODE_LOCATION_1: NORMAL
MDC_IDC_SET_LEADCHNL_RA_SENSING_CATHODE_ELECTRODE_1: NORMAL
MDC_IDC_SET_LEADCHNL_RA_SENSING_CATHODE_LOCATION_1: NORMAL
MDC_IDC_SET_LEADCHNL_RA_SENSING_POLARITY: NORMAL
MDC_IDC_SET_LEADCHNL_RA_SENSING_SENSITIVITY: 0.6 MV
MDC_IDC_SET_LEADCHNL_RV_PACING_AMPLITUDE: 2 V
MDC_IDC_SET_LEADCHNL_RV_PACING_ANODE_ELECTRODE_1: NORMAL
MDC_IDC_SET_LEADCHNL_RV_PACING_ANODE_LOCATION_1: NORMAL
MDC_IDC_SET_LEADCHNL_RV_PACING_CAPTURE_MODE: NORMAL
MDC_IDC_SET_LEADCHNL_RV_PACING_CATHODE_ELECTRODE_1: NORMAL
MDC_IDC_SET_LEADCHNL_RV_PACING_CATHODE_LOCATION_1: NORMAL
MDC_IDC_SET_LEADCHNL_RV_PACING_POLARITY: NORMAL
MDC_IDC_SET_LEADCHNL_RV_PACING_PULSEWIDTH: 0.4 MS
MDC_IDC_SET_LEADCHNL_RV_SENSING_ANODE_ELECTRODE_1: NORMAL
MDC_IDC_SET_LEADCHNL_RV_SENSING_ANODE_LOCATION_1: NORMAL
MDC_IDC_SET_LEADCHNL_RV_SENSING_CATHODE_ELECTRODE_1: NORMAL
MDC_IDC_SET_LEADCHNL_RV_SENSING_CATHODE_LOCATION_1: NORMAL
MDC_IDC_SET_LEADCHNL_RV_SENSING_POLARITY: NORMAL
MDC_IDC_SET_LEADCHNL_RV_SENSING_SENSITIVITY: 0.9 MV
MDC_IDC_SET_ZONE_DETECTION_INTERVAL: 370 MS
MDC_IDC_SET_ZONE_DETECTION_INTERVAL: 400 MS
MDC_IDC_SET_ZONE_TYPE: NORMAL
MDC_IDC_SET_ZONE_VENDOR_TYPE: NORMAL
MDC_IDC_STAT_AT_BURDEN_PERCENT: 0 %
MDC_IDC_STAT_AT_DTM_END: NORMAL
MDC_IDC_STAT_AT_DTM_START: NORMAL
MDC_IDC_STAT_BRADY_AP_VP_PERCENT: 0 %
MDC_IDC_STAT_BRADY_AP_VS_PERCENT: 4.54 %
MDC_IDC_STAT_BRADY_AS_VP_PERCENT: 0.03 %
MDC_IDC_STAT_BRADY_AS_VS_PERCENT: 95.42 %
MDC_IDC_STAT_BRADY_DTM_END: NORMAL
MDC_IDC_STAT_BRADY_DTM_START: NORMAL
MDC_IDC_STAT_BRADY_RA_PERCENT_PACED: 4.55 %
MDC_IDC_STAT_BRADY_RV_PERCENT_PACED: 0.04 %
MDC_IDC_STAT_EPISODE_RECENT_COUNT: 0
MDC_IDC_STAT_EPISODE_RECENT_COUNT: 130
MDC_IDC_STAT_EPISODE_RECENT_COUNT_DTM_END: NORMAL
MDC_IDC_STAT_EPISODE_RECENT_COUNT_DTM_START: NORMAL
MDC_IDC_STAT_EPISODE_TOTAL_COUNT: 0
MDC_IDC_STAT_EPISODE_TOTAL_COUNT: 1050
MDC_IDC_STAT_EPISODE_TOTAL_COUNT: 2
MDC_IDC_STAT_EPISODE_TOTAL_COUNT: 4
MDC_IDC_STAT_EPISODE_TOTAL_COUNT_DTM_END: NORMAL
MDC_IDC_STAT_EPISODE_TOTAL_COUNT_DTM_START: NORMAL
MDC_IDC_STAT_EPISODE_TYPE: NORMAL

## 2024-04-12 PROCEDURE — 93280 PM DEVICE PROGR EVAL DUAL: CPT | Performed by: INTERNAL MEDICINE

## 2024-04-19 LAB — NONINV COLON CA DNA+OCC BLD SCRN STL QL: NORMAL

## 2024-05-13 DIAGNOSIS — F51.01 PRIMARY INSOMNIA: ICD-10-CM

## 2024-05-13 RX ORDER — ZOLPIDEM TARTRATE 10 MG/1
10 TABLET ORAL NIGHTLY
Qty: 30 TABLET | Refills: 1 | Status: SHIPPED | OUTPATIENT
Start: 2024-05-13

## 2024-06-06 ENCOUNTER — APPOINTMENT (OUTPATIENT)
Dept: ELECTROPHYSIOLOGY | Age: 59
End: 2024-06-06
Attending: INTERNAL MEDICINE

## 2024-06-13 ENCOUNTER — OFFICE VISIT (OUTPATIENT)
Dept: FAMILY MEDICINE CLINIC | Age: 59
End: 2024-06-13
Payer: COMMERCIAL

## 2024-06-13 ENCOUNTER — LAB (OUTPATIENT)
Dept: LAB | Facility: HOSPITAL | Age: 59
End: 2024-06-13
Payer: COMMERCIAL

## 2024-06-13 VITALS
BODY MASS INDEX: 28.35 KG/M2 | HEIGHT: 70 IN | SYSTOLIC BLOOD PRESSURE: 128 MMHG | HEART RATE: 108 BPM | DIASTOLIC BLOOD PRESSURE: 84 MMHG | WEIGHT: 198 LBS | TEMPERATURE: 98.1 F

## 2024-06-13 DIAGNOSIS — E11.65 TYPE 2 DIABETES MELLITUS WITH HYPERGLYCEMIA, WITHOUT LONG-TERM CURRENT USE OF INSULIN: Primary | ICD-10-CM

## 2024-06-13 DIAGNOSIS — M1A.0720 IDIOPATHIC CHRONIC GOUT OF LEFT FOOT WITHOUT TOPHUS: ICD-10-CM

## 2024-06-13 DIAGNOSIS — I10 ESSENTIAL HYPERTENSION: ICD-10-CM

## 2024-06-13 DIAGNOSIS — I25.10 CORONARY ARTERY DISEASE INVOLVING NATIVE CORONARY ARTERY OF NATIVE HEART WITHOUT ANGINA PECTORIS: ICD-10-CM

## 2024-06-13 DIAGNOSIS — Z79.899 OTHER LONG TERM (CURRENT) DRUG THERAPY: ICD-10-CM

## 2024-06-13 DIAGNOSIS — E11.65 TYPE 2 DIABETES MELLITUS WITH HYPERGLYCEMIA, WITHOUT LONG-TERM CURRENT USE OF INSULIN: ICD-10-CM

## 2024-06-13 DIAGNOSIS — E78.2 MIXED HYPERLIPIDEMIA: ICD-10-CM

## 2024-06-13 LAB
ALBUMIN SERPL-MCNC: 4.4 G/DL (ref 3.5–5.2)
ALBUMIN UR-MCNC: <1.2 MG/DL
ALBUMIN/GLOB SERPL: 1.9 G/DL
ALP SERPL-CCNC: 61 U/L (ref 39–117)
ALT SERPL W P-5'-P-CCNC: 37 U/L (ref 1–41)
ANION GAP SERPL CALCULATED.3IONS-SCNC: 12.9 MMOL/L (ref 5–15)
AST SERPL-CCNC: 22 U/L (ref 1–40)
BILIRUB SERPL-MCNC: 0.3 MG/DL (ref 0–1.2)
BUN SERPL-MCNC: 18 MG/DL (ref 8–23)
BUN/CREAT SERPL: 15.8 (ref 7–25)
CALCIUM SPEC-SCNC: 9.1 MG/DL (ref 8.6–10.5)
CHLORIDE SERPL-SCNC: 101 MMOL/L (ref 98–107)
CHOLEST SERPL-MCNC: 106 MG/DL (ref 0–200)
CO2 SERPL-SCNC: 23.1 MMOL/L (ref 22–29)
CREAT SERPL-MCNC: 1.14 MG/DL (ref 0.76–1.27)
DEPRECATED RDW RBC AUTO: 39.6 FL (ref 37–54)
EGFRCR SERPLBLD CKD-EPI 2021: 73.6 ML/MIN/1.73
ERYTHROCYTE [DISTWIDTH] IN BLOOD BY AUTOMATED COUNT: 12.6 % (ref 12.3–15.4)
GLOBULIN UR ELPH-MCNC: 2.3 GM/DL
GLUCOSE SERPL-MCNC: 307 MG/DL (ref 65–99)
HBA1C MFR BLD: 9.6 % (ref 4.8–5.6)
HCT VFR BLD AUTO: 41.6 % (ref 37.5–51)
HDLC SERPL-MCNC: 35 MG/DL (ref 40–60)
HGB BLD-MCNC: 13.7 G/DL (ref 13–17.7)
LDLC SERPL CALC-MCNC: 35 MG/DL (ref 0–100)
LDLC/HDLC SERPL: 0.69 {RATIO}
MCH RBC QN AUTO: 28.4 PG (ref 26.6–33)
MCHC RBC AUTO-ENTMCNC: 32.9 G/DL (ref 31.5–35.7)
MCV RBC AUTO: 86.1 FL (ref 79–97)
PLATELET # BLD AUTO: 172 10*3/MM3 (ref 140–450)
PMV BLD AUTO: 10.7 FL (ref 6–12)
POTASSIUM SERPL-SCNC: 4.1 MMOL/L (ref 3.5–5.2)
PROT SERPL-MCNC: 6.7 G/DL (ref 6–8.5)
RBC # BLD AUTO: 4.83 10*6/MM3 (ref 4.14–5.8)
SODIUM SERPL-SCNC: 137 MMOL/L (ref 136–145)
TRIGL SERPL-MCNC: 235 MG/DL (ref 0–150)
URATE SERPL-MCNC: 3.5 MG/DL (ref 3.4–7)
VLDLC SERPL-MCNC: 36 MG/DL (ref 5–40)
WBC NRBC COR # BLD AUTO: 6.16 10*3/MM3 (ref 3.4–10.8)

## 2024-06-13 PROCEDURE — 80061 LIPID PANEL: CPT

## 2024-06-13 PROCEDURE — 82043 UR ALBUMIN QUANTITATIVE: CPT

## 2024-06-13 PROCEDURE — 99214 OFFICE O/P EST MOD 30 MIN: CPT | Performed by: FAMILY MEDICINE

## 2024-06-13 PROCEDURE — 83036 HEMOGLOBIN GLYCOSYLATED A1C: CPT

## 2024-06-13 PROCEDURE — 36415 COLL VENOUS BLD VENIPUNCTURE: CPT

## 2024-06-13 PROCEDURE — 80053 COMPREHEN METABOLIC PANEL: CPT

## 2024-06-13 PROCEDURE — 84550 ASSAY OF BLOOD/URIC ACID: CPT

## 2024-06-13 PROCEDURE — 85027 COMPLETE CBC AUTOMATED: CPT

## 2024-06-13 RX ORDER — GLIPIZIDE 5 MG/1
5 TABLET ORAL
Qty: 180 TABLET | Refills: 3 | Status: SHIPPED | OUTPATIENT
Start: 2024-06-13

## 2024-06-13 RX ORDER — ROSUVASTATIN CALCIUM 20 MG/1
20 TABLET, COATED ORAL NIGHTLY
Qty: 90 TABLET | Refills: 3 | Status: SHIPPED | OUTPATIENT
Start: 2024-06-13

## 2024-06-13 RX ORDER — METFORMIN HYDROCHLORIDE 500 MG/1
1000 TABLET, EXTENDED RELEASE ORAL
Qty: 180 TABLET | Refills: 3 | Status: SHIPPED | OUTPATIENT
Start: 2024-06-13

## 2024-06-13 RX ORDER — LOSARTAN POTASSIUM 50 MG/1
50 TABLET ORAL DAILY
Qty: 90 TABLET | Refills: 3 | Status: SHIPPED | OUTPATIENT
Start: 2024-06-13

## 2024-06-13 RX ORDER — ALLOPURINOL 100 MG/1
200 TABLET ORAL DAILY
Qty: 180 TABLET | Refills: 3 | Status: SHIPPED | OUTPATIENT
Start: 2024-06-13

## 2024-06-13 NOTE — PROGRESS NOTES
Man Gill presents to [facility].    Chief Complaint:  Diabetes, blood pressure, cholesterol, CAD    Subjective   History of Present Illness:  Gera is being seen today for follow-up on his care.  He has type 2 diabetes for which he currently takes glipizide and metformin.  His sugar will vary between 130-200.  He says it tends to be higher as opposed to lower.  He was previously prescribed Jardiance, but he is not taking it due to expense.    He also has hypertension, elevated cholesterol, and known coronary artery disease.  He remains on treatment for these problems as noted.  His blood pressure is mildly elevated on initial check.    Review of Systems:  Review of Systems   Constitutional:  Negative for chills and fever.   Respiratory:  Negative for cough and shortness of breath.    Cardiovascular:  Negative for chest pain and palpitations.   Gastrointestinal:  Negative for abdominal pain, nausea and vomiting.      Objective   Medical History:  Past Medical History:    Coronary artery disease    Essential hypertension    Gout    Kidney stones    Mixed hyperlipidemia    STEMI (ST elevation myocardial infarction)    6/1/23 3 STENTS PLACED    Type 2 diabetes mellitus    AVERAGE 'S     Past Surgical History:    CARDIAC CATHETERIZATION    Procedure: Left Heart Cath;  Surgeon: Jin Harrison MD;  Location: [facility] INVASIVE LOCATION;  Service: Cardiology;  Laterality: N/A;    CARDIAC CATHETERIZATION    Procedure: Percutaneous Coronary Intervention;  Surgeon: Jin Harrison MD;  Location: [facility] INVASIVE LOCATION;  Service: Cardiology;  Laterality: N/A;    CARDIAC CATHETERIZATION    Procedure: Left Heart Cath with possible coronary angioplasty;  Surgeon: Jin Harrison MD;  Location: [facility] INVASIVE LOCATION;  Service: Cardiology;  Laterality: N/A;    COLONOSCOPY    Normal      Family History   Problem Relation Age of Onset    Hypertension  Mother     Coronary artery disease Father     Hypertension Father     Heart disease Father     Coronary artery disease Brother      Social History     Tobacco Use    Smoking status: Former     Current packs/day: 0.00     Average packs/day: 1 pack/day for 20.0 years (20.0 ttl pk-yrs)     Types: Cigarettes     Start date: 1976     Quit date: 1996     Years since quittin.4    Smokeless tobacco: Never   Substance Use Topics    Alcohol use: Not Currently       Health Maintenance Due   Topic Date Due    DIABETIC EYE EXAM  Never done    ZOSTER VACCINE (1 of 2) Never done    TDAP/TD VACCINES (3 - Td or Tdap) 08/10/2022    RSV Vaccine - Adults (1 - 1-dose 60+ series) Never done    HEMOGLOBIN A1C  2024        Immunization History   Administered Date(s) Administered    Td (TDVAX) 2004    Tdap 08/10/2012       No Known Allergies     Medications:  Current Outpatient Medications on File Prior to Visit   Medication Sig    Accu-Chek FastClix Lancets misc USE TO check blood sugar EVERY DAY    albuterol sulfate  (90 Base) MCG/ACT inhaler Inhale 2 puffs Every 4 (Four) Hours As Needed for Wheezing or Shortness of Air (cough).    aspirin 81 MG EC tablet Take 1 tablet by mouth Daily.    glucose blood (Accu-Chek Guide) test strip USE TO check blood sugar EVERY DAY    metoprolol tartrate (LOPRESSOR) 25 MG tablet Take 1 tablet by mouth Every 12 (Twelve) Hours.    nitroglycerin (NITROSTAT) 0.4 MG SL tablet Place 1 tablet under the tongue Every 5 (Five) Minutes As Needed for Chest Pain. Take no more than 3 doses in 15 minutes.    ticagrelor (BRILINTA) 90 MG tablet tablet Take 1 tablet by mouth 2 (Two) Times a Day.    [DISCONTINUED] allopurinol (ZYLOPRIM) 100 MG tablet Take 2 tablets by mouth Daily.    [DISCONTINUED] glipizide (GLUCOTROL) 5 MG tablet Take 1 tablet by mouth 2 (Two) Times a Day Before Meals.    [DISCONTINUED] losartan (COZAAR) 25 MG tablet Take 1 tablet by mouth Daily.    [DISCONTINUED] metFORMIN  "ER (GLUCOPHAGE-XR) 500 MG 24 hr tablet Take 2 tablets by mouth Daily With Dinner.    [DISCONTINUED] rosuvastatin (CRESTOR) 20 MG tablet Take 1 tablet by mouth Every Night.    [DISCONTINUED] empagliflozin (JARDIANCE) 25 MG tablet tablet Take 1 tablet by mouth Daily.     No current facility-administered medications on file prior to visit.       Vital Signs:   Vitals:    06/13/24 1445 06/13/24 1532   BP: 146/88 128/84   BP Location: Right arm Right arm   Patient Position: Sitting Sitting   Pulse: 108 108   Temp: 98.1 °F (36.7 °C)    TempSrc: Oral    Weight: 89.8 kg (198 lb)    Height: 177.8 cm (70\")    Body mass index is 28.41 kg/m².    Physical Exam:  Physical Exam  Vitals reviewed.   Constitutional:       General: He is not in acute distress.     Appearance: He is not ill-appearing.   Eyes:      Pupils: Pupils are equal, round, and reactive to light.   Neck:      Comments: No thyromegaly  Cardiovascular:      Rate and Rhythm: Normal rate and regular rhythm.   Pulmonary:      Effort: Pulmonary effort is normal.      Breath sounds: Normal breath sounds.   Abdominal:      General: There is no distension.      Palpations: Abdomen is soft.      Tenderness: There is no abdominal tenderness.   Musculoskeletal:      Cervical back: Neck supple.   Lymphadenopathy:      Cervical: No cervical adenopathy.   Skin:     Findings: No lesion or rash.   Neurological:      Mental Status: He is alert.       Result Review   The following data was reviewed by Vadim Mishra MD on 06/13/2024.  Lab Results   Component Value Date    WBC 5.86 12/08/2023    HGB 14.5 12/08/2023    HCT 42.1 12/08/2023    MCV 86.6 12/08/2023     12/08/2023     Lab Results   Component Value Date    GLUCOSE 187 (H) 12/08/2023    BUN 16 12/08/2023    CREATININE 0.96 12/08/2023     12/08/2023    K 4.0 12/08/2023     12/08/2023    CO2 21.9 (L) 12/08/2023    CALCIUM 9.3 12/08/2023    PROTEINTOT 6.9 12/08/2023    ALBUMIN 4.5 12/08/2023    ALT " 44 (H) 12/08/2023    AST 34 12/08/2023    ALKPHOS 68 12/08/2023    BILITOT 0.3 12/08/2023    EGFR 90.5 12/08/2023    GLOB 2.4 12/08/2023    AGRATIO 1.9 12/08/2023    BCR 16.7 12/08/2023    ANIONGAP 14.1 12/08/2023      Lab Results   Component Value Date    CHOL 113 07/15/2023    CHLPL 151 04/03/2021    TRIG 139 07/15/2023    HDL 39 (L) 07/15/2023    LDL 50 07/15/2023     Lab Results   Component Value Date    TSH 1.010 12/08/2023     Lab Results   Component Value Date    HGBA1C 8.50 (H) 12/08/2023     Lab Results   Component Value Date    PSA 0.430 07/15/2023    PSA 0.444 05/17/2022    PSA 0.51 04/03/2021     BMI is >= 25 and <30. (Overweight) The following options were offered after discussion;: exercise counseling/recommendations and nutrition counseling/recommendations         Assessment and Plan:   Gera is doing well overall.  For the immediate near term, we will refill his medications and update labs as noted.  I am concerned that his A1c will still be too high.  We may consider a trial of a GLP-1 agonist if affordable.  If not affordable, we will consider our other options.  Tentative follow-up will be again in about 6 months.  We will recheck his blood pressure before he leaves.  Vaccines have been reviewed today.    Diagnoses and all orders for this visit:    1. Type 2 diabetes mellitus with hyperglycemia, without long-term current use of insulin (Primary)  -     MicroAlbumin, Urine, Random - Urine, Clean Catch; Future  -     Hemoglobin A1c; Future  -     Comprehensive Metabolic Panel; Future  -     glipizide (GLUCOTROL) 5 MG tablet; Take 1 tablet by mouth 2 (Two) Times a Day Before Meals.  Dispense: 180 tablet; Refill: 3  -     metFORMIN ER (GLUCOPHAGE-XR) 500 MG 24 hr tablet; Take 2 tablets by mouth Daily With Dinner.  Dispense: 180 tablet; Refill: 3    2. Essential hypertension  -     Comprehensive Metabolic Panel; Future  -     losartan (COZAAR) 50 MG tablet; Take 1 tablet by mouth Daily.  Dispense: 90  tablet; Refill: 3    3. Mixed hyperlipidemia  -     Lipid Panel; Future  -     Comprehensive Metabolic Panel; Future  -     rosuvastatin (CRESTOR) 20 MG tablet; Take 1 tablet by mouth Every Night.  Dispense: 90 tablet; Refill: 3    4. Coronary artery disease involving native coronary artery of native heart without angina pectoris  -     Lipid Panel; Future  -     CBC (No Diff); Future  -     rosuvastatin (CRESTOR) 20 MG tablet; Take 1 tablet by mouth Every Night.  Dispense: 90 tablet; Refill: 3    5. Idiopathic chronic gout of left foot without tophus  -     Uric Acid; Future  -     allopurinol (ZYLOPRIM) 100 MG tablet; Take 2 tablets by mouth Daily.  Dispense: 180 tablet; Refill: 3    6. Other long term (current) drug therapy  -     CBC (No Diff); Future    Follow Up  Return in about 6 months (around 12/13/2024) for Recheck, Annual physical.  Patient was given instructions and counseling regarding his condition or for health maintenance advice. Please see specific information pulled into the AVS if appropriate.

## 2024-06-14 ENCOUNTER — OFFICE VISIT (OUTPATIENT)
Dept: CARDIOLOGY | Facility: CLINIC | Age: 59
End: 2024-06-14
Payer: COMMERCIAL

## 2024-06-14 VITALS
WEIGHT: 193 LBS | SYSTOLIC BLOOD PRESSURE: 158 MMHG | HEART RATE: 76 BPM | HEIGHT: 70 IN | DIASTOLIC BLOOD PRESSURE: 109 MMHG | BODY MASS INDEX: 27.63 KG/M2

## 2024-06-14 DIAGNOSIS — I21.11 ACUTE ST ELEVATION MYOCARDIAL INFARCTION (STEMI) INVOLVING RIGHT CORONARY ARTERY: ICD-10-CM

## 2024-06-14 DIAGNOSIS — R07.9 CHEST PAIN, UNSPECIFIED TYPE: ICD-10-CM

## 2024-06-14 DIAGNOSIS — I10 PRIMARY HYPERTENSION: ICD-10-CM

## 2024-06-14 DIAGNOSIS — E78.2 MIXED HYPERLIPIDEMIA: Primary | ICD-10-CM

## 2024-06-14 PROCEDURE — 99214 OFFICE O/P EST MOD 30 MIN: CPT | Performed by: INTERNAL MEDICINE

## 2024-06-14 RX ORDER — SULINDAC 200 MG/1
TABLET ORAL
Qty: 180 TABLET | Refills: 1 | Status: SHIPPED | OUTPATIENT
Start: 2024-06-14

## 2024-06-14 NOTE — PROGRESS NOTES
Chief Complaint  Coronary Artery Disease and Follow-up    Subjective        Man Gill presents to [facility]  History of present illness:    Patient states overall he is doing well.  He notes occasional chest pain/lower abdominal pain just after eating while sitting down.  He notes no exertional chest pain.  He is not smoking.  He notes no edema or palpitations.  He does not smoke.      Past Medical History:   Diagnosis Date    Coronary artery disease     Essential hypertension     Gout     Kidney stones     Mixed hyperlipidemia     STEMI (ST elevation myocardial infarction)     23 3 STENTS PLACED    Type 2 diabetes mellitus     AVERAGE 'S         Past Surgical History:   Procedure Laterality Date    CARDIAC CATHETERIZATION N/A 2023    Procedure: Left Heart Cath;  Surgeon: Jin Harrison MD;  Location: [facility] CATH INVASIVE LOCATION;  Service: Cardiology;  Laterality: N/A;    CARDIAC CATHETERIZATION N/A 2023    Procedure: Percutaneous Coronary Intervention;  Surgeon: Jin Harrison MD;  Location: [facility] CATH INVASIVE LOCATION;  Service: Cardiology;  Laterality: N/A;    CARDIAC CATHETERIZATION N/A 2023    Procedure: Left Heart Cath with possible coronary angioplasty;  Surgeon: Jin Harrison MD;  Location: [facility] CATH INVASIVE LOCATION;  Service: Cardiology;  Laterality: N/A;    COLONOSCOPY  11/15/2013    Normal          Social History     Socioeconomic History    Marital status:      Spouse name: Mariely    Number of children: 1   Tobacco Use    Smoking status: Former     Current packs/day: 0.00     Average packs/day: 1 pack/day for 20.0 years (20.0 ttl pk-yrs)     Types: Cigarettes     Start date: 1976     Quit date: 1996     Years since quittin.4    Smokeless tobacco: Never   Vaping Use    Vaping status: Never Used   Substance and Sexual Activity    Alcohol use: Not Currently    Drug use: Never    Sexual  "activity: Defer         Family History   Problem Relation Age of Onset    Hypertension Mother     Coronary artery disease Father     Hypertension Father     Heart disease Father     Coronary artery disease Brother           No Known Allergies         Current Outpatient Medications:     Accu-Chek FastClix Lancets misc, USE TO check blood sugar EVERY DAY, Disp: 100 each, Rfl: 3    albuterol sulfate  (90 Base) MCG/ACT inhaler, Inhale 2 puffs Every 4 (Four) Hours As Needed for Wheezing or Shortness of Air (cough)., Disp: 18 g, Rfl: 2    allopurinol (ZYLOPRIM) 100 MG tablet, Take 2 tablets by mouth Daily., Disp: 180 tablet, Rfl: 3    aspirin 81 MG EC tablet, Take 1 tablet by mouth Daily., Disp: , Rfl:     glipizide (GLUCOTROL) 5 MG tablet, Take 1 tablet by mouth 2 (Two) Times a Day Before Meals., Disp: 180 tablet, Rfl: 3    glucose blood (Accu-Chek Guide) test strip, USE TO check blood sugar EVERY DAY, Disp: 100 each, Rfl: 3    losartan (COZAAR) 50 MG tablet, Take 1 tablet by mouth Daily., Disp: 90 tablet, Rfl: 3    metFORMIN ER (GLUCOPHAGE-XR) 500 MG 24 hr tablet, Take 2 tablets by mouth Daily With Dinner., Disp: 180 tablet, Rfl: 3    metoprolol tartrate (LOPRESSOR) 25 MG tablet, Take 1 tablet by mouth Every 12 (Twelve) Hours., Disp: 180 tablet, Rfl: 3    nitroglycerin (NITROSTAT) 0.4 MG SL tablet, Place 1 tablet under the tongue Every 5 (Five) Minutes As Needed for Chest Pain. Take no more than 3 doses in 15 minutes., Disp: 25 tablet, Rfl: 3    rosuvastatin (CRESTOR) 20 MG tablet, Take 1 tablet by mouth Every Night., Disp: 90 tablet, Rfl: 3    ticagrelor (BRILINTA) 90 MG tablet tablet, Take 1 tablet by mouth 2 (Two) Times a Day., Disp: 180 tablet, Rfl: 3      ROS:  Cardiac review of systems positive for atypical chest pain.    Objective     BP (!) 158/109 (BP Location: Right arm)   Pulse 76   Ht 177.8 cm (70\")   Wt 87.5 kg (193 lb)   BMI 27.69 kg/m²       General Appearance:   well developed  well " nourished  HENT:   oropharynx moist  lips not cyanotic  Respiratory:  no respiratory distress  normal breath sounds  no rales  Cardiovascular:  no jugular venous distention  regular rhythm  S1 normal, S2 normal  no S3, no S4   no murmur  no rub, no thrill  No carotid bruit  pedal pulses normal  lower extremity edema: none    Musculoskeletal:  no clubbing of fingers.   normocephalic, head atraumatic  Skin:   warm, dry  Psychiatric:  judgement and insight appropriate  normal mood and affect    ECHO:  Results for orders placed during the hospital encounter of 23    Adult Transthoracic Echo Complete W/ Cont if Necessary Per Protocol    Interpretation Summary    Left ventricular ejection fraction appears to be 51 - 55%.  Inferior wall appears to be moving normally.    Left ventricular wall thickness is consistent with borderline concentric hypertrophy.    Left ventricular diastolic function is consistent with (grade I) impaired relaxation.    No significant valvular disease.    STRESS:    CATH:  Results for orders placed during the hospital encounter of 23    Cardiac Catheterization/Vascular Study    Conclusion  Psychiatric  CARDIAC CATHETERIZATION PROCEDURE REPORT    Patient: Man Gill  : 1965  MRN: [id number]  Procedure Date: 23    Referring Physician:  Vadim Mishra M.D.    Interventional Cardiologist:  Jin Harrison MD    Indication:  Known borderline proximal to mid LAD stenosis.      Clinical Presentation:  Patient is a 57-year-old male who presented several weeks ago with inferior STEMI and received 3 drug-eluting stents to the RCA.  He was found to have a borderline proximal to mid LAD stenosis and was brought back for a staged procedure with IFR.    Procedure performed:  Right radial arterial sheath placement  Selective coronary Angiography  IFR to the proximal to mid LAD that came back as 0.91-0.92.  Moderate conscious sedation  TR Terumo band device placement for  hemostasis    Details of the procedure:  Informed consent was obtained with an explanation of the risks, benefits and alternatives of the procedure. The patient was brought to the Cardiac Catheterization Laboratory in a fasting state.  The patient was prepped and draped in a standard, sterile fashion. Moderate conscious sedation with Fentanyl and Versed was administered by the circulating nurse. Lidocaine 2% was used to anesthetize the right radial artery and a 5/6 Maltese glidesheath was placed via modified Seldinger technique.  TIG catheter was used to selectively engage both coronary arteries.  Multiple scintigraphic views were obtained.      Findings:  1. Coronary Artery Anatomy:  Dominance: Right  Left Main: Moderate size vessel gives off LAD and circumflex.  No angiographic disease.  Left Anterior Descending: Moderate size vessel and then bifurcates at the distal segment.  There is a 50 to 60% proximal to mid tubular narrowing.  This was IFR negative at 0.91-0.92.  Left Circumflex Artery: Mild diffuse disease  Right Coronary Artery: Patent stents with distal mild diffuse disease.      4. Percutaneous Intervention:  Location: Proximal to mid LAD  Treatment: IFR that came back 0.91-0.92.  Pre-stenosis: 50-60%  Post-stenosis: 50-60%  VIRIDIANA Flow Pre: 3  VIRIDIANA Flow Post: 3  Bifurcation: No  Severe Calcium: No  Dissection: No            Details of angioplasty:    Heparin was used for anticoagulation throughout the procedure with frequent checking of ACT.  A XB LAD 3.5 guide catheter was used to selectively cannulate the left main coronary artery.  A IFR wire was used to selectively cannulate the LAD with mild manipulation but no major difficulty.  iFR was performed across the proximal to mid LAD tubular narrowing that came back 0.91-0.92.    At the end of the procedure, the right radial arterial sheath was removed and a TR band was applied for hemostasis.  Adequate hemostasis was achieved.  Patient tolerated procedure  well without any complications.  The results of the test were explained in detail to the patient.    Cumulative fluoroscopy time: 15 min    Cumulative air kerma: 562 mGy    Total amount of contrast used: See report ml of Isovue      Complications:  None.    Estimated Blood Loss:  Minimal.    Conclusions:  Patent stents in the RCA.  IFR negative proximal to mid tubular narrowing which is probably in the 50 to 60% range.  iFR came back 0.91-0.92.  No significant left circumflex stenosis.    Recommendations:  Continue medical management with aspirin, Brilinta and Crestor.  Goal-directed medical therapy.  Risk factor reduction.    Jin Harrison MD    07/07/23  11:28 EDT    BMP:     Glucose   Date Value Ref Range Status   06/13/2024 307 (H) 65 - 99 mg/dL Final     BUN   Date Value Ref Range Status   06/13/2024 18 8 - 23 mg/dL Final     Creatinine   Date Value Ref Range Status   06/13/2024 1.14 0.76 - 1.27 mg/dL Final     Sodium   Date Value Ref Range Status   06/13/2024 137 136 - 145 mmol/L Final     Potassium   Date Value Ref Range Status   06/13/2024 4.1 3.5 - 5.2 mmol/L Final     Chloride   Date Value Ref Range Status   06/13/2024 101 98 - 107 mmol/L Final     CO2   Date Value Ref Range Status   06/13/2024 23.1 22.0 - 29.0 mmol/L Final     Calcium   Date Value Ref Range Status   06/13/2024 9.1 8.6 - 10.5 mg/dL Final     BUN/Creatinine Ratio   Date Value Ref Range Status   06/13/2024 15.8 7.0 - 25.0 Final     Anion Gap   Date Value Ref Range Status   06/13/2024 12.9 5.0 - 15.0 mmol/L Final     eGFR   Date Value Ref Range Status   06/13/2024 73.6 >60.0 mL/min/1.73 Final     LIPIDS:  Total Cholesterol   Date Value Ref Range Status   06/13/2024 106 0 - 200 mg/dL Final     Triglycerides   Date Value Ref Range Status   06/13/2024 235 (H) 0 - 150 mg/dL Final     HDL Cholesterol   Date Value Ref Range Status   06/13/2024 35 (L) 40 - 60 mg/dL Final     LDL Cholesterol    Date Value Ref Range Status   06/13/2024 35 0 - 100  mg/dL Final     VLDL Cholesterol   Date Value Ref Range Status   06/13/2024 36 5 - 40 mg/dL Final     LDL/HDL Ratio   Date Value Ref Range Status   06/13/2024 0.69  Final         Procedures             ASSESSMENT:  Diagnoses and all orders for this visit:    1. Mixed hyperlipidemia (Primary)    2. Acute ST elevation myocardial infarction (STEMI) involving right coronary artery    3. Chest pain, unspecified type         PLAN:    1.  Continue the aspirin.  The patient just reached 1 year since his inferior ST elevation MI.  He can stop the Brilinta.  2.  Patient's chest pain is very atypical and I do not think it represents cardiac source.  3.  Continue the Crestor.  Patient cholesterol checked 6/13/2024 with LDL 35, HDL 35, and triglycerides 235.  These are under excellent control.  4.  Patient's blood pressure has been running a little bit high but he just had his losartan increased yesterday to 50 daily and has not yet started it.  He has 2 blood pressure monitors at home and I told him once he has been on it for a week to start monitoring it closely.  5.  Encouraged the patient to start a regular exercise program walking 30 minutes 5 days a week.  6.  Encouraged the patient to get his A1c down.  He is working with his primary care physician.  7.  Patient does not smoke.      No follow-ups on file.     Patient was given instructions and counseling regarding his condition or for health maintenance advice. Please see specific information pulled into the AVS if appropriate.         Jin Harrison MD   6/14/2024  12:20 EDT

## 2024-06-17 ENCOUNTER — TELEPHONE (OUTPATIENT)
Dept: FAMILY MEDICINE CLINIC | Age: 59
End: 2024-06-17
Payer: COMMERCIAL

## 2024-06-17 NOTE — TELEPHONE ENCOUNTER
Noted.  The alternatives would be Ozempic, Victoza, or Bydureon.  I would recommend he check with his insurance and see if 1 of these is better covered or more affordable.  Thanks.

## 2024-06-17 NOTE — TELEPHONE ENCOUNTER
"Caller: Man Gill \"Gera\"    Relationship: Self    Best call back number: [phone]    What medication are you requesting: ALTERNATIVE TO MOUNJARO        If a prescription is needed, what is your preferred pharmacy and phone number: [facility] - [place], [place] - [address]  - [phone]  - [phone] FX     Additional notes: THIS MEDICATION IS TOO EXPENSIVE AND HE WOULD LIKE TO TRY A DIFFERENT ONE IF THERE ARE MORE ALTERNATIVES         "

## 2024-07-12 ENCOUNTER — APPOINTMENT (OUTPATIENT)
Dept: FAMILY MEDICINE | Age: 59
End: 2024-07-12

## 2024-07-15 ENCOUNTER — LAB SERVICES (OUTPATIENT)
Dept: LAB | Age: 59
End: 2024-07-15

## 2024-07-15 ENCOUNTER — APPOINTMENT (OUTPATIENT)
Dept: FAMILY MEDICINE | Age: 59
End: 2024-07-15

## 2024-07-15 VITALS
DIASTOLIC BLOOD PRESSURE: 70 MMHG | BODY MASS INDEX: 34.84 KG/M2 | OXYGEN SATURATION: 96 % | TEMPERATURE: 98 F | SYSTOLIC BLOOD PRESSURE: 134 MMHG | HEIGHT: 65 IN | WEIGHT: 209.1 LBS | RESPIRATION RATE: 16 BRPM | HEART RATE: 94 BPM

## 2024-07-15 DIAGNOSIS — E78.2 MIXED HYPERLIPIDEMIA: Primary | ICD-10-CM

## 2024-07-15 DIAGNOSIS — Z13.220 NEED FOR LIPID SCREENING: ICD-10-CM

## 2024-07-15 DIAGNOSIS — G47.31 COMPLEX SLEEP APNEA SYNDROME: ICD-10-CM

## 2024-07-15 DIAGNOSIS — Z12.5 PROSTATE CANCER SCREENING: ICD-10-CM

## 2024-07-15 DIAGNOSIS — I49.5 SINOATRIAL NODE DYSFUNCTION  (CMD): ICD-10-CM

## 2024-07-15 DIAGNOSIS — I47.10 SVT (SUPRAVENTRICULAR TACHYCARDIA) (CMD): ICD-10-CM

## 2024-07-15 DIAGNOSIS — F51.01 PRIMARY INSOMNIA: ICD-10-CM

## 2024-07-15 DIAGNOSIS — R97.20 ELEVATED PSA: ICD-10-CM

## 2024-07-15 LAB
BASOPHILS # BLD: 0 K/MCL (ref 0–0.3)
BASOPHILS NFR BLD: 1 %
DEPRECATED RDW RBC: 42.2 FL (ref 39–50)
EOSINOPHIL # BLD: 0.1 K/MCL (ref 0–0.5)
EOSINOPHIL NFR BLD: 2 %
ERYTHROCYTE [DISTWIDTH] IN BLOOD: 13 % (ref 11–15)
HCT VFR BLD CALC: 45.1 % (ref 39–51)
HGB BLD-MCNC: 15.4 G/DL (ref 13–17)
IMM GRANULOCYTES # BLD AUTO: 0.1 K/MCL (ref 0–0.2)
IMM GRANULOCYTES # BLD: 1 %
LYMPHOCYTES # BLD: 0.8 K/MCL (ref 1–4)
LYMPHOCYTES NFR BLD: 16 %
MCH RBC QN AUTO: 30.8 PG (ref 26–34)
MCHC RBC AUTO-ENTMCNC: 34.1 G/DL (ref 32–36.5)
MCV RBC AUTO: 90.2 FL (ref 78–100)
MONOCYTES # BLD: 0.4 K/MCL (ref 0.3–0.9)
MONOCYTES NFR BLD: 9 %
NEUTROPHILS # BLD: 3.5 K/MCL (ref 1.8–7.7)
NEUTROPHILS NFR BLD: 71 %
NRBC BLD MANUAL-RTO: 0 /100 WBC
PLATELET # BLD AUTO: 214 K/MCL (ref 140–450)
RBC # BLD: 5 MIL/MCL (ref 4.5–5.9)
WBC # BLD: 4.8 K/MCL (ref 4.2–11)

## 2024-07-15 PROCEDURE — 80061 LIPID PANEL: CPT | Performed by: CLINICAL MEDICAL LABORATORY

## 2024-07-15 PROCEDURE — 80053 COMPREHEN METABOLIC PANEL: CPT | Performed by: CLINICAL MEDICAL LABORATORY

## 2024-07-15 PROCEDURE — 84153 ASSAY OF PSA TOTAL: CPT | Performed by: CLINICAL MEDICAL LABORATORY

## 2024-07-15 PROCEDURE — 85025 COMPLETE CBC W/AUTO DIFF WBC: CPT | Performed by: CLINICAL MEDICAL LABORATORY

## 2024-07-15 PROCEDURE — 99213 OFFICE O/P EST LOW 20 MIN: CPT | Performed by: PHYSICIAN ASSISTANT

## 2024-07-15 PROCEDURE — 36415 COLL VENOUS BLD VENIPUNCTURE: CPT | Performed by: INTERNAL MEDICINE

## 2024-07-15 RX ORDER — SULINDAC 200 MG/1
200 TABLET ORAL 2 TIMES DAILY
Qty: 180 TABLET | Refills: 1 | Status: SHIPPED | OUTPATIENT
Start: 2024-07-15

## 2024-07-15 RX ORDER — TADALAFIL 20 MG/1
TABLET ORAL
Qty: 30 TABLET | Refills: 11 | Status: SHIPPED | OUTPATIENT
Start: 2024-07-15

## 2024-07-15 RX ORDER — ZOLPIDEM TARTRATE 10 MG/1
10 TABLET ORAL NIGHTLY
Qty: 90 TABLET | Refills: 1 | Status: SHIPPED | OUTPATIENT
Start: 2024-07-15

## 2024-07-15 ASSESSMENT — ENCOUNTER SYMPTOMS
VOMITING: 0
DIZZINESS: 0
WHEEZING: 0
COUGH: 0
NAUSEA: 0
HEADACHES: 0
DIARRHEA: 0
SLEEP DISTURBANCE: 1
SHORTNESS OF BREATH: 0
FEVER: 0
DIAPHORESIS: 0
NUMBNESS: 0
CHILLS: 0
FATIGUE: 0

## 2024-07-15 ASSESSMENT — PATIENT HEALTH QUESTIONNAIRE - PHQ9
2. FEELING DOWN, DEPRESSED OR HOPELESS: NOT AT ALL
SUM OF ALL RESPONSES TO PHQ9 QUESTIONS 1 AND 2: 0
SUM OF ALL RESPONSES TO PHQ9 QUESTIONS 1 AND 2: 0
1. LITTLE INTEREST OR PLEASURE IN DOING THINGS: NOT AT ALL
CLINICAL INTERPRETATION OF PHQ2 SCORE: NO FURTHER SCREENING NEEDED

## 2024-07-16 ENCOUNTER — TELEPHONE (OUTPATIENT)
Dept: UROLOGY | Age: 59
End: 2024-07-16

## 2024-07-16 LAB
ALBUMIN SERPL-MCNC: 3.5 G/DL (ref 3.6–5.1)
ALBUMIN/GLOB SERPL: 1.2 {RATIO} (ref 1–2.4)
ALP SERPL-CCNC: 98 UNITS/L (ref 45–117)
ALT SERPL-CCNC: 21 UNITS/L
ANION GAP SERPL CALC-SCNC: 12 MMOL/L (ref 7–19)
AST SERPL-CCNC: 16 UNITS/L
BILIRUB SERPL-MCNC: 0.9 MG/DL (ref 0.2–1)
BUN SERPL-MCNC: 15 MG/DL (ref 6–20)
BUN/CREAT SERPL: 14 (ref 7–25)
CALCIUM SERPL-MCNC: 9.7 MG/DL (ref 8.4–10.2)
CHLORIDE SERPL-SCNC: 106 MMOL/L (ref 97–110)
CHOLEST SERPL-MCNC: 176 MG/DL
CHOLEST/HDLC SERPL: 3.9 {RATIO}
CO2 SERPL-SCNC: 27 MMOL/L (ref 21–32)
CREAT SERPL-MCNC: 1.04 MG/DL (ref 0.67–1.17)
EGFRCR SERPLBLD CKD-EPI 2021: 82 ML/MIN/{1.73_M2}
FASTING DURATION TIME PATIENT: ABNORMAL H
GLOBULIN SER-MCNC: 3 G/DL (ref 2–4)
GLUCOSE SERPL-MCNC: 100 MG/DL (ref 70–99)
HDLC SERPL-MCNC: 45 MG/DL
LDLC SERPL CALC-MCNC: 96 MG/DL
NONHDLC SERPL-MCNC: 131 MG/DL
POTASSIUM SERPL-SCNC: 4.4 MMOL/L (ref 3.4–5.1)
PROT SERPL-MCNC: 6.5 G/DL (ref 6.4–8.2)
PSA SERPL-MCNC: 4.42 NG/ML
SODIUM SERPL-SCNC: 141 MMOL/L (ref 135–145)
TRIGL SERPL-MCNC: 176 MG/DL

## 2024-07-25 ENCOUNTER — E-ADVICE (OUTPATIENT)
Dept: UROLOGY | Age: 59
End: 2024-07-25

## 2024-08-12 RX ORDER — FLUTICASONE PROPIONATE 50 MCG
SPRAY, SUSPENSION (ML) NASAL
Qty: 48 ML | Refills: 11 | Status: SHIPPED | OUTPATIENT
Start: 2024-08-12

## 2024-08-15 DIAGNOSIS — E11.9 TYPE 2 DIABETES MELLITUS WITHOUT COMPLICATION, WITHOUT LONG-TERM CURRENT USE OF INSULIN: ICD-10-CM

## 2024-08-15 RX ORDER — LANCETS
EACH MISCELLANEOUS
Qty: 100 EACH | Refills: 3 | Status: SHIPPED | OUTPATIENT
Start: 2024-08-15

## 2024-08-15 RX ORDER — BLOOD SUGAR DIAGNOSTIC
STRIP MISCELLANEOUS
Qty: 100 EACH | Refills: 3 | Status: SHIPPED | OUTPATIENT
Start: 2024-08-15

## 2024-08-21 ENCOUNTER — TELEPHONE (OUTPATIENT)
Dept: CARDIOLOGY | Age: 59
End: 2024-08-21

## 2024-08-22 ENCOUNTER — APPOINTMENT (OUTPATIENT)
Dept: UROLOGY | Age: 59
End: 2024-08-22

## 2024-08-22 VITALS
HEART RATE: 91 BPM | RESPIRATION RATE: 16 BRPM | DIASTOLIC BLOOD PRESSURE: 84 MMHG | WEIGHT: 210 LBS | SYSTOLIC BLOOD PRESSURE: 143 MMHG | BODY MASS INDEX: 34.99 KG/M2 | HEIGHT: 65 IN

## 2024-08-22 DIAGNOSIS — R97.20 ELEVATED PSA: Primary | ICD-10-CM

## 2024-08-22 PROCEDURE — 99214 OFFICE O/P EST MOD 30 MIN: CPT | Performed by: UROLOGY

## 2024-08-29 ENCOUNTER — OFFICE VISIT (OUTPATIENT)
Dept: FAMILY MEDICINE | Age: 59
End: 2024-08-29

## 2024-08-29 ENCOUNTER — LAB SERVICES (OUTPATIENT)
Dept: LAB | Age: 59
End: 2024-08-29

## 2024-08-29 VITALS
TEMPERATURE: 97.9 F | RESPIRATION RATE: 18 BRPM | OXYGEN SATURATION: 97 % | BODY MASS INDEX: 34.66 KG/M2 | HEART RATE: 74 BPM | SYSTOLIC BLOOD PRESSURE: 138 MMHG | HEIGHT: 65 IN | DIASTOLIC BLOOD PRESSURE: 82 MMHG | WEIGHT: 208 LBS

## 2024-08-29 DIAGNOSIS — Z00.00 GENERAL MEDICAL EXAM: Primary | ICD-10-CM

## 2024-08-29 DIAGNOSIS — I47.10 SVT (SUPRAVENTRICULAR TACHYCARDIA) (CMD): ICD-10-CM

## 2024-08-29 DIAGNOSIS — G47.31 CENTRAL SLEEP APNEA: ICD-10-CM

## 2024-08-29 DIAGNOSIS — F51.01 PRIMARY INSOMNIA: ICD-10-CM

## 2024-08-29 DIAGNOSIS — R97.20 ELEVATED PSA: ICD-10-CM

## 2024-08-29 DIAGNOSIS — M54.2 NECK PAIN: ICD-10-CM

## 2024-08-29 DIAGNOSIS — I49.5 SINOATRIAL NODE DYSFUNCTION  (CMD): ICD-10-CM

## 2024-08-29 PROCEDURE — 80307 DRUG TEST PRSMV CHEM ANLYZR: CPT | Performed by: CLINICAL MEDICAL LABORATORY

## 2024-08-29 PROCEDURE — 99396 PREV VISIT EST AGE 40-64: CPT | Performed by: PHYSICIAN ASSISTANT

## 2024-08-29 PROCEDURE — 36415 COLL VENOUS BLD VENIPUNCTURE: CPT | Performed by: INTERNAL MEDICINE

## 2024-08-29 PROCEDURE — 84153 ASSAY OF PSA TOTAL: CPT | Performed by: CLINICAL MEDICAL LABORATORY

## 2024-08-29 RX ORDER — TIZANIDINE 2 MG/1
TABLET ORAL
Qty: 120 TABLET | Refills: 2 | Status: SHIPPED | OUTPATIENT
Start: 2024-08-29

## 2024-08-29 ASSESSMENT — ENCOUNTER SYMPTOMS
NAUSEA: 0
SINUS PAIN: 0
VOMITING: 0
COUGH: 0
CHILLS: 0
FEVER: 0
WHEEZING: 0
SHORTNESS OF BREATH: 0
DIZZINESS: 0
SORE THROAT: 0
HEADACHES: 0
SLEEP DISTURBANCE: 0
FATIGUE: 0
BLOOD IN STOOL: 0
DIARRHEA: 0
DIAPHORESIS: 0

## 2024-08-29 ASSESSMENT — PATIENT HEALTH QUESTIONNAIRE - PHQ9
1. LITTLE INTEREST OR PLEASURE IN DOING THINGS: NOT AT ALL
SUM OF ALL RESPONSES TO PHQ9 QUESTIONS 1 AND 2: 0
CLINICAL INTERPRETATION OF PHQ2 SCORE: NO FURTHER SCREENING NEEDED
2. FEELING DOWN, DEPRESSED OR HOPELESS: NOT AT ALL
SUM OF ALL RESPONSES TO PHQ9 QUESTIONS 1 AND 2: 0

## 2024-08-30 LAB — PSA SERPL-MCNC: 4.36 NG/ML

## 2024-09-10 LAB
6MAM UR QL: NEGATIVE NG/ML
7AMINOCLONAZEPAM UR QL: NEGATIVE NG/ML
A-OH ALPRAZ UR QL: NEGATIVE NG/ML
ALPRAZ UR QL: NEGATIVE NG/ML
AMITRIP UR QL: NEGATIVE NG/ML
AMPHETAMINES UR QL: NEGATIVE NG/ML
BARBITURATES UR QL SCN>200 NG/ML: NEGATIVE
BUPRENORPHINE UR QL: NEGATIVE NG/ML
BUPROPION UR QL: NEGATIVE NG/ML
BZE UR QL: NEGATIVE NG/ML
CARBOXYTHC UR QL: NEGATIVE NG/ML
CARISOPRODOL UR QL: NEGATIVE NG/ML
CITALOPRAM UR QL: NEGATIVE NG/ML
CODEINE UR QL: NEGATIVE NG/ML
CREAT UR-MCNC: 78.2 MG/DL
CYCLOBENZAPRINE UR QL: NEGATIVE NG/ML
DULOXETINE UR QL: NEGATIVE NG/ML
EDDP UR QL: NEGATIVE NG/ML
ETHANOL UR-MCNC: NEGATIVE MG/DL
FENTANYL UR QL: NEGATIVE NG/ML
FLUOXETINE UR QL: NEGATIVE NG/ML
GABAPENTIN UR QL: NEGATIVE NG/ML
HYDROCODONE UR QL: NEGATIVE NG/ML
HYDROMORPHONE UR QL: NEGATIVE NG/ML
KETAMINE UR QL: NEGATIVE NG/ML
LORAZEPAM UR QL: NEGATIVE NG/ML
MDA UR QL: NEGATIVE NG/ML
MDMA UR QL: NEGATIVE NG/ML
ME-PHENIDATE UR QL: NEGATIVE NG/ML
MEPERIDINE UR QL: NEGATIVE NG/ML
MEPROBAMATE UR QL: NEGATIVE NG/ML
METHADONE UR QL: NEGATIVE NG/ML
METHAMPHET UR QL: NEGATIVE NG/ML
MIRTAZAPINE UR QL: NEGATIVE NG/ML
MORPHINE UR QL: NEGATIVE NG/ML
NALOXONE UR QL CFM: NEGATIVE NG/ML
NALTREXONE UR QL: NEGATIVE NG/ML
NORBUPRENORPHINE UR QL CFM: NEGATIVE NG/ML
NORDIAZEPAM UR QL: NEGATIVE NG/ML
NORDOXEPIN UR QL: NEGATIVE NG/ML
NORFENTANYL UR QL: NEGATIVE NG/ML
NORHYDROCODONE UR QL CFM: NEGATIVE NG/ML
NORMEPERIDINE UR QL: NEGATIVE NG/ML
NOROXYCODONE UR QL CFM: NEGATIVE NG/ML
NORTRIP UR QL: NEGATIVE NG/ML
O-NORTRAMADOL UR QL: NEGATIVE NG/ML
OXAZEPAM UR QL: NEGATIVE NG/ML
OXIDANTS UR QL: NEGATIVE
OXYCODONE UR QL: NEGATIVE NG/ML
OXYMORPHONE UR QL: NEGATIVE NG/ML
PAROXETINE UR QL: NEGATIVE NG/ML
PCP UR QL: NEGATIVE NG/ML
PH UR: 7 [PH] (ref 4.5–9)
PHENTERMINE UR QL: NEGATIVE NG/ML
PREGABALIN UR QL CFM: NEGATIVE NG/ML
PROLINTANE UR-MCNC: NEGATIVE NG/ML
PROPOXYPH UR QL: NEGATIVE NG/ML
SERTRALINE UR QL: NEGATIVE NG/ML
TAPENTADOL UR QL CFM: NEGATIVE NG/ML
TEMAZEPAM UR QL: NEGATIVE NG/ML
TRAMADOL UR QL: NEGATIVE NG/ML
VENLAFAXINE UR QL: NEGATIVE NG/ML
ZOLPIDEM UR QL: DETECTED NG/ML

## 2024-09-29 DIAGNOSIS — Z95.0 PACEMAKER: ICD-10-CM

## 2024-09-29 DIAGNOSIS — R00.1 BRADYCARDIA: Primary | ICD-10-CM

## 2024-09-29 DIAGNOSIS — I47.10 SVT (SUPRAVENTRICULAR TACHYCARDIA) (CMD): ICD-10-CM

## 2024-09-29 DIAGNOSIS — I49.5 SINOATRIAL NODE DYSFUNCTION  (CMD): ICD-10-CM

## 2024-10-01 ENCOUNTER — APPOINTMENT (OUTPATIENT)
Dept: ELECTROPHYSIOLOGY | Age: 59
End: 2024-10-01
Attending: INTERNAL MEDICINE

## 2024-10-01 ENCOUNTER — E-ADVICE (OUTPATIENT)
Dept: ELECTROPHYSIOLOGY | Age: 59
End: 2024-10-01

## 2024-10-01 DIAGNOSIS — R00.1 BRADYCARDIA: ICD-10-CM

## 2024-10-01 LAB
MDC_IDC_EPISODE_DTM: NORMAL
MDC_IDC_EPISODE_DURATION: 10 S
MDC_IDC_EPISODE_DURATION: 12 S
MDC_IDC_EPISODE_DURATION: 122 S
MDC_IDC_EPISODE_DURATION: 13 S
MDC_IDC_EPISODE_DURATION: 134 S
MDC_IDC_EPISODE_DURATION: 138 S
MDC_IDC_EPISODE_DURATION: 139 S
MDC_IDC_EPISODE_DURATION: 14 S
MDC_IDC_EPISODE_DURATION: 22 S
MDC_IDC_EPISODE_DURATION: 22 S
MDC_IDC_EPISODE_DURATION: 24 S
MDC_IDC_EPISODE_DURATION: 249 S
MDC_IDC_EPISODE_DURATION: 26 S
MDC_IDC_EPISODE_DURATION: 27 S
MDC_IDC_EPISODE_DURATION: 30 S
MDC_IDC_EPISODE_DURATION: 31 S
MDC_IDC_EPISODE_DURATION: 35 S
MDC_IDC_EPISODE_DURATION: 41 S
MDC_IDC_EPISODE_DURATION: 44 S
MDC_IDC_EPISODE_DURATION: 5 S
MDC_IDC_EPISODE_DURATION: 6 S
MDC_IDC_EPISODE_DURATION: 65 S
MDC_IDC_EPISODE_DURATION: 66 S
MDC_IDC_EPISODE_DURATION: 82 S
MDC_IDC_EPISODE_DURATION: 88 S
MDC_IDC_EPISODE_ID: 1103
MDC_IDC_EPISODE_ID: 1104
MDC_IDC_EPISODE_ID: 1105
MDC_IDC_EPISODE_ID: 1106
MDC_IDC_EPISODE_ID: 1107
MDC_IDC_EPISODE_ID: 1108
MDC_IDC_EPISODE_ID: 1109
MDC_IDC_EPISODE_ID: 1110
MDC_IDC_EPISODE_ID: 1111
MDC_IDC_EPISODE_ID: 1112
MDC_IDC_EPISODE_ID: 1113
MDC_IDC_EPISODE_ID: 1114
MDC_IDC_EPISODE_ID: 1115
MDC_IDC_EPISODE_ID: 1116
MDC_IDC_EPISODE_ID: 1117
MDC_IDC_EPISODE_ID: 1118
MDC_IDC_EPISODE_ID: 1119
MDC_IDC_EPISODE_ID: 1120
MDC_IDC_EPISODE_ID: 1121
MDC_IDC_EPISODE_ID: 1122
MDC_IDC_EPISODE_ID: 1123
MDC_IDC_EPISODE_ID: 1124
MDC_IDC_EPISODE_ID: 1125
MDC_IDC_EPISODE_ID: 1126
MDC_IDC_EPISODE_ID: 1127
MDC_IDC_EPISODE_TYPE: NORMAL
MDC_IDC_MSMT_BATTERY_DTM: NORMAL
MDC_IDC_MSMT_BATTERY_REMAINING_LONGEVITY: 39 MO
MDC_IDC_MSMT_BATTERY_RRT_TRIGGER: 2.83
MDC_IDC_MSMT_BATTERY_STATUS: NORMAL
MDC_IDC_MSMT_BATTERY_VOLTAGE: 2.95 V
MDC_IDC_MSMT_LEADCHNL_RA_IMPEDANCE_VALUE: 418 OHM
MDC_IDC_MSMT_LEADCHNL_RA_IMPEDANCE_VALUE: 456 OHM
MDC_IDC_MSMT_LEADCHNL_RA_PACING_THRESHOLD_AMPLITUDE: 0.5 V
MDC_IDC_MSMT_LEADCHNL_RA_PACING_THRESHOLD_PULSEWIDTH: 0.4 MS
MDC_IDC_MSMT_LEADCHNL_RA_SENSING_INTR_AMPL: 2.75 MV
MDC_IDC_MSMT_LEADCHNL_RA_SENSING_INTR_AMPL: 2.75 MV
MDC_IDC_MSMT_LEADCHNL_RV_IMPEDANCE_VALUE: 399 OHM
MDC_IDC_MSMT_LEADCHNL_RV_IMPEDANCE_VALUE: 456 OHM
MDC_IDC_MSMT_LEADCHNL_RV_PACING_THRESHOLD_AMPLITUDE: 0.88 V
MDC_IDC_MSMT_LEADCHNL_RV_PACING_THRESHOLD_PULSEWIDTH: 0.4 MS
MDC_IDC_MSMT_LEADCHNL_RV_SENSING_INTR_AMPL: 4.38 MV
MDC_IDC_MSMT_LEADCHNL_RV_SENSING_INTR_AMPL: 4.38 MV
MDC_IDC_PG_IMPLANT_DTM: NORMAL
MDC_IDC_PG_MFG: NORMAL
MDC_IDC_PG_MODEL: NORMAL
MDC_IDC_PG_SERIAL: NORMAL
MDC_IDC_PG_TYPE: NORMAL
MDC_IDC_SESS_CLINIC_NAME: NORMAL
MDC_IDC_SESS_DTM: NORMAL
MDC_IDC_SESS_TYPE: NORMAL
MDC_IDC_SET_BRADY_AT_MODE_SWITCH_RATE: 162 {BEATS}/MIN
MDC_IDC_SET_BRADY_HYSTRATE: NORMAL
MDC_IDC_SET_BRADY_LOWRATE: 50 {BEATS}/MIN
MDC_IDC_SET_BRADY_MAX_SENSOR_RATE: 130 {BEATS}/MIN
MDC_IDC_SET_BRADY_MAX_TRACKING_RATE: 130 {BEATS}/MIN
MDC_IDC_SET_BRADY_MODE: NORMAL
MDC_IDC_SET_BRADY_PAV_DELAY_LOW: 180 MS
MDC_IDC_SET_BRADY_SAV_DELAY_LOW: 150 MS
MDC_IDC_SET_LEADCHNL_RA_PACING_AMPLITUDE: 1.5 V
MDC_IDC_SET_LEADCHNL_RA_PACING_ANODE_ELECTRODE_1: NORMAL
MDC_IDC_SET_LEADCHNL_RA_PACING_ANODE_LOCATION_1: NORMAL
MDC_IDC_SET_LEADCHNL_RA_PACING_CAPTURE_MODE: NORMAL
MDC_IDC_SET_LEADCHNL_RA_PACING_CATHODE_ELECTRODE_1: NORMAL
MDC_IDC_SET_LEADCHNL_RA_PACING_CATHODE_LOCATION_1: NORMAL
MDC_IDC_SET_LEADCHNL_RA_PACING_POLARITY: NORMAL
MDC_IDC_SET_LEADCHNL_RA_PACING_PULSEWIDTH: 0.4 MS
MDC_IDC_SET_LEADCHNL_RA_SENSING_ANODE_ELECTRODE_1: NORMAL
MDC_IDC_SET_LEADCHNL_RA_SENSING_ANODE_LOCATION_1: NORMAL
MDC_IDC_SET_LEADCHNL_RA_SENSING_CATHODE_ELECTRODE_1: NORMAL
MDC_IDC_SET_LEADCHNL_RA_SENSING_CATHODE_LOCATION_1: NORMAL
MDC_IDC_SET_LEADCHNL_RA_SENSING_POLARITY: NORMAL
MDC_IDC_SET_LEADCHNL_RA_SENSING_SENSITIVITY: 0.6 MV
MDC_IDC_SET_LEADCHNL_RV_PACING_AMPLITUDE: 2 V
MDC_IDC_SET_LEADCHNL_RV_PACING_ANODE_ELECTRODE_1: NORMAL
MDC_IDC_SET_LEADCHNL_RV_PACING_ANODE_LOCATION_1: NORMAL
MDC_IDC_SET_LEADCHNL_RV_PACING_CAPTURE_MODE: NORMAL
MDC_IDC_SET_LEADCHNL_RV_PACING_CATHODE_ELECTRODE_1: NORMAL
MDC_IDC_SET_LEADCHNL_RV_PACING_CATHODE_LOCATION_1: NORMAL
MDC_IDC_SET_LEADCHNL_RV_PACING_POLARITY: NORMAL
MDC_IDC_SET_LEADCHNL_RV_PACING_PULSEWIDTH: 0.4 MS
MDC_IDC_SET_LEADCHNL_RV_SENSING_ANODE_ELECTRODE_1: NORMAL
MDC_IDC_SET_LEADCHNL_RV_SENSING_ANODE_LOCATION_1: NORMAL
MDC_IDC_SET_LEADCHNL_RV_SENSING_CATHODE_ELECTRODE_1: NORMAL
MDC_IDC_SET_LEADCHNL_RV_SENSING_CATHODE_LOCATION_1: NORMAL
MDC_IDC_SET_LEADCHNL_RV_SENSING_POLARITY: NORMAL
MDC_IDC_SET_LEADCHNL_RV_SENSING_SENSITIVITY: 0.9 MV
MDC_IDC_SET_ZONE_DETECTION_INTERVAL: 370 MS
MDC_IDC_SET_ZONE_DETECTION_INTERVAL: 400 MS
MDC_IDC_SET_ZONE_STATUS: NORMAL
MDC_IDC_SET_ZONE_STATUS: NORMAL
MDC_IDC_SET_ZONE_TYPE: NORMAL
MDC_IDC_SET_ZONE_VENDOR_TYPE: NORMAL
MDC_IDC_STAT_AT_BURDEN_PERCENT: 0 %
MDC_IDC_STAT_AT_DTM_END: NORMAL
MDC_IDC_STAT_AT_DTM_START: NORMAL
MDC_IDC_STAT_BRADY_AP_VP_PERCENT: 0 %
MDC_IDC_STAT_BRADY_AP_VS_PERCENT: 2.04 %
MDC_IDC_STAT_BRADY_AS_VP_PERCENT: 0.03 %
MDC_IDC_STAT_BRADY_AS_VS_PERCENT: 97.92 %
MDC_IDC_STAT_BRADY_DTM_END: NORMAL
MDC_IDC_STAT_BRADY_DTM_START: NORMAL
MDC_IDC_STAT_BRADY_RA_PERCENT_PACED: 2.05 %
MDC_IDC_STAT_BRADY_RV_PERCENT_PACED: 0.03 %
MDC_IDC_STAT_EPISODE_RECENT_COUNT: 0
MDC_IDC_STAT_EPISODE_RECENT_COUNT: 70
MDC_IDC_STAT_EPISODE_RECENT_COUNT_DTM_END: NORMAL
MDC_IDC_STAT_EPISODE_RECENT_COUNT_DTM_START: NORMAL
MDC_IDC_STAT_EPISODE_TOTAL_COUNT: 0
MDC_IDC_STAT_EPISODE_TOTAL_COUNT: 1120
MDC_IDC_STAT_EPISODE_TOTAL_COUNT: 2
MDC_IDC_STAT_EPISODE_TOTAL_COUNT: 4
MDC_IDC_STAT_EPISODE_TOTAL_COUNT_DTM_END: NORMAL
MDC_IDC_STAT_EPISODE_TOTAL_COUNT_DTM_START: NORMAL
MDC_IDC_STAT_EPISODE_TYPE: NORMAL
MDC_IDC_STAT_TACHYTHERAPY_RECENT_DTM_END: NORMAL
MDC_IDC_STAT_TACHYTHERAPY_RECENT_DTM_START: NORMAL
MDC_IDC_STAT_TACHYTHERAPY_TOTAL_DTM_END: NORMAL
MDC_IDC_STAT_TACHYTHERAPY_TOTAL_DTM_START: NORMAL

## 2024-10-01 PROCEDURE — 93296 REM INTERROG EVL PM/IDS: CPT | Performed by: INTERNAL MEDICINE

## 2024-10-01 PROCEDURE — 93294 REM INTERROG EVL PM/LDLS PM: CPT | Performed by: INTERNAL MEDICINE

## 2024-10-07 ENCOUNTER — APPOINTMENT (OUTPATIENT)
Dept: GENERAL RADIOLOGY | Age: 59
End: 2024-10-07
Attending: PHYSICIAN ASSISTANT

## 2024-10-07 ENCOUNTER — TELEPHONE (OUTPATIENT)
Dept: ORTHOPEDICS | Age: 59
End: 2024-10-07

## 2024-10-07 ENCOUNTER — APPOINTMENT (OUTPATIENT)
Dept: ORTHOPEDICS | Age: 59
End: 2024-10-07
Attending: PHYSICIAN ASSISTANT

## 2024-10-07 DIAGNOSIS — M54.12 RADICULOPATHY, CERVICAL: Primary | ICD-10-CM

## 2024-10-07 DIAGNOSIS — Z95.0 PACEMAKER: ICD-10-CM

## 2024-10-07 DIAGNOSIS — M54.50 LOW BACK PAIN, UNSPECIFIED BACK PAIN LATERALITY, UNSPECIFIED CHRONICITY, UNSPECIFIED WHETHER SCIATICA PRESENT: ICD-10-CM

## 2024-10-07 PROCEDURE — 72100 X-RAY EXAM L-S SPINE 2/3 VWS: CPT | Performed by: RADIOLOGY

## 2024-10-07 PROCEDURE — 99203 OFFICE O/P NEW LOW 30 MIN: CPT | Performed by: PHYSICIAN ASSISTANT

## 2024-10-07 ASSESSMENT — ENCOUNTER SYMPTOMS
ABDOMINAL PAIN: 0
BRUISES/BLEEDS EASILY: 0
FACIAL ASYMMETRY: 0
COLOR CHANGE: 0
SPEECH DIFFICULTY: 0
FATIGUE: 0
ACTIVITY CHANGE: 1
CHEST TIGHTNESS: 0
NUMBNESS: 1
SHORTNESS OF BREATH: 0
WEAKNESS: 1
TROUBLE SWALLOWING: 0
FEVER: 0
DIZZINESS: 0
BACK PAIN: 0

## 2024-10-11 ENCOUNTER — TELEPHONE (OUTPATIENT)
Dept: ORTHOPEDICS | Age: 59
End: 2024-10-11

## 2024-10-11 ENCOUNTER — IMAGING SERVICES (OUTPATIENT)
Dept: GENERAL RADIOLOGY | Age: 59
End: 2024-10-11
Attending: PHYSICIAN ASSISTANT

## 2024-10-11 DIAGNOSIS — Z95.0 PACEMAKER: ICD-10-CM

## 2024-10-11 PROCEDURE — 71045 X-RAY EXAM CHEST 1 VIEW: CPT | Performed by: RADIOLOGY

## 2024-11-15 ENCOUNTER — HOSPITAL ENCOUNTER (OUTPATIENT)
Dept: MRI IMAGING | Age: 59
Discharge: HOME | End: 2024-11-15
Attending: PHYSICIAN ASSISTANT

## 2024-11-15 ENCOUNTER — HOSPITAL ENCOUNTER (OUTPATIENT)
Dept: CARDIOLOGY | Age: 59
End: 2024-11-15
Attending: PHYSICIAN ASSISTANT

## 2024-11-15 ENCOUNTER — HOSPITAL ENCOUNTER (OUTPATIENT)
Dept: GENERAL RADIOLOGY | Age: 59
End: 2024-11-15
Attending: RADIOLOGY

## 2024-11-15 VITALS — HEART RATE: 61 BPM | OXYGEN SATURATION: 96 % | DIASTOLIC BLOOD PRESSURE: 67 MMHG | SYSTOLIC BLOOD PRESSURE: 112 MMHG

## 2024-11-15 DIAGNOSIS — Z95.0 PACEMAKER: ICD-10-CM

## 2024-11-15 DIAGNOSIS — M54.12 RADICULOPATHY, CERVICAL: ICD-10-CM

## 2024-11-15 DIAGNOSIS — Z95.0 PACEMAKER: Primary | ICD-10-CM

## 2024-11-15 PROCEDURE — 70030 X-RAY EYE FOR FOREIGN BODY: CPT | Performed by: STUDENT IN AN ORGANIZED HEALTH CARE EDUCATION/TRAINING PROGRAM

## 2024-11-15 PROCEDURE — 93286 PERI-PX EVAL PM/LDLS PM IP: CPT

## 2024-11-15 PROCEDURE — 93286 PERI-PX EVAL PM/LDLS PM IP: CPT | Performed by: INTERNAL MEDICINE

## 2024-11-15 PROCEDURE — 70030 X-RAY EYE FOR FOREIGN BODY: CPT

## 2024-11-15 PROCEDURE — 72141 MRI NECK SPINE W/O DYE: CPT

## 2024-11-15 PROCEDURE — 72141 MRI NECK SPINE W/O DYE: CPT | Performed by: STUDENT IN AN ORGANIZED HEALTH CARE EDUCATION/TRAINING PROGRAM

## 2024-11-21 ENCOUNTER — LAB SERVICES (OUTPATIENT)
Dept: LAB | Age: 59
End: 2024-11-21

## 2024-11-21 ENCOUNTER — OFFICE VISIT (OUTPATIENT)
Dept: FAMILY MEDICINE | Age: 59
End: 2024-11-21

## 2024-11-21 VITALS
SYSTOLIC BLOOD PRESSURE: 132 MMHG | HEART RATE: 86 BPM | OXYGEN SATURATION: 95 % | WEIGHT: 206.9 LBS | RESPIRATION RATE: 14 BRPM | DIASTOLIC BLOOD PRESSURE: 84 MMHG | BODY MASS INDEX: 34.47 KG/M2 | HEIGHT: 65 IN

## 2024-11-21 DIAGNOSIS — K62.89 RECTAL PAIN: ICD-10-CM

## 2024-11-21 DIAGNOSIS — R97.20 ELEVATED PSA: ICD-10-CM

## 2024-11-21 DIAGNOSIS — K64.9 HEMORRHOIDS, UNSPECIFIED HEMORRHOID TYPE: ICD-10-CM

## 2024-11-21 DIAGNOSIS — L98.9 LESION OF SKIN OF SCALP: Primary | ICD-10-CM

## 2024-11-21 DIAGNOSIS — Z23 NEED FOR VACCINATION: ICD-10-CM

## 2024-11-21 PROCEDURE — 36415 COLL VENOUS BLD VENIPUNCTURE: CPT | Performed by: INTERNAL MEDICINE

## 2024-11-21 PROCEDURE — 84153 ASSAY OF PSA TOTAL: CPT | Performed by: CLINICAL MEDICAL LABORATORY

## 2024-11-21 ASSESSMENT — ENCOUNTER SYMPTOMS
RECTAL PAIN: 1
ANAL BLEEDING: 0
BLOOD IN STOOL: 0

## 2024-11-21 ASSESSMENT — PATIENT HEALTH QUESTIONNAIRE - PHQ9
2. FEELING DOWN, DEPRESSED OR HOPELESS: NOT AT ALL
SUM OF ALL RESPONSES TO PHQ9 QUESTIONS 1 AND 2: 0
CLINICAL INTERPRETATION OF PHQ2 SCORE: NO FURTHER SCREENING NEEDED
SUM OF ALL RESPONSES TO PHQ9 QUESTIONS 1 AND 2: 0
1. LITTLE INTEREST OR PLEASURE IN DOING THINGS: NOT AT ALL

## 2024-11-22 ENCOUNTER — APPOINTMENT (OUTPATIENT)
Dept: FAMILY MEDICINE | Age: 59
End: 2024-11-22

## 2024-11-22 LAB — PSA SERPL-MCNC: 3.61 NG/ML

## 2024-11-26 ENCOUNTER — APPOINTMENT (OUTPATIENT)
Dept: UROLOGY | Age: 59
End: 2024-11-26

## 2024-11-27 ENCOUNTER — APPOINTMENT (OUTPATIENT)
Dept: FAMILY MEDICINE | Age: 59
End: 2024-11-27

## 2024-12-05 ENCOUNTER — PREP FOR CASE (OUTPATIENT)
Dept: SURGERY | Age: 59
End: 2024-12-05

## 2024-12-10 ASSESSMENT — ENCOUNTER SYMPTOMS
EYE PAIN: 0
TROUBLE SWALLOWING: 0
CHEST TIGHTNESS: 0
WOUND: 0
BRUISES/BLEEDS EASILY: 0
HEADACHES: 0
SPEECH DIFFICULTY: 0
ACTIVITY CHANGE: 1
ABDOMINAL PAIN: 0
FACIAL ASYMMETRY: 0
SHORTNESS OF BREATH: 0

## 2024-12-12 ENCOUNTER — OFFICE VISIT (OUTPATIENT)
Dept: SURGERY | Age: 59
End: 2024-12-12
Attending: PHYSICIAN ASSISTANT

## 2024-12-12 VITALS
HEIGHT: 65 IN | DIASTOLIC BLOOD PRESSURE: 70 MMHG | BODY MASS INDEX: 34.82 KG/M2 | SYSTOLIC BLOOD PRESSURE: 112 MMHG | WEIGHT: 209 LBS | TEMPERATURE: 97 F | HEART RATE: 88 BPM

## 2024-12-12 DIAGNOSIS — K64.9 HEMORRHOIDS, UNSPECIFIED HEMORRHOID TYPE: ICD-10-CM

## 2024-12-12 DIAGNOSIS — K64.1 GRADE II HEMORRHOIDS: Primary | ICD-10-CM

## 2024-12-12 DIAGNOSIS — K62.89 RECTAL PAIN: ICD-10-CM

## 2024-12-12 DIAGNOSIS — K60.30 PERIANAL FISTULA: ICD-10-CM

## 2024-12-13 ENCOUNTER — OFFICE VISIT (OUTPATIENT)
Dept: ORTHOPEDICS | Age: 59
End: 2024-12-13

## 2024-12-13 DIAGNOSIS — M54.12 RADICULOPATHY, CERVICAL REGION: Primary | ICD-10-CM

## 2024-12-13 DIAGNOSIS — M47.812 CERVICAL SPONDYLOSIS WITHOUT MYELOPATHY: ICD-10-CM

## 2024-12-13 ASSESSMENT — ENCOUNTER SYMPTOMS: WEAKNESS: 0

## 2024-12-20 ENCOUNTER — TELEMEDICINE (OUTPATIENT)
Dept: FAMILY MEDICINE CLINIC | Age: 59
End: 2024-12-20
Payer: COMMERCIAL

## 2024-12-20 DIAGNOSIS — Z79.899 OTHER LONG TERM (CURRENT) DRUG THERAPY: ICD-10-CM

## 2024-12-20 DIAGNOSIS — E11.65 TYPE 2 DIABETES MELLITUS WITH HYPERGLYCEMIA, WITHOUT LONG-TERM CURRENT USE OF INSULIN: ICD-10-CM

## 2024-12-20 DIAGNOSIS — I10 ESSENTIAL HYPERTENSION: ICD-10-CM

## 2024-12-20 DIAGNOSIS — Z00.00 PHYSICAL EXAM: Primary | ICD-10-CM

## 2024-12-20 DIAGNOSIS — Z12.11 SCREEN FOR COLON CANCER: ICD-10-CM

## 2024-12-20 DIAGNOSIS — I25.10 CORONARY ARTERY DISEASE INVOLVING NATIVE CORONARY ARTERY OF NATIVE HEART WITHOUT ANGINA PECTORIS: ICD-10-CM

## 2024-12-20 DIAGNOSIS — E78.2 MIXED HYPERLIPIDEMIA: ICD-10-CM

## 2024-12-20 DIAGNOSIS — M1A.0720 IDIOPATHIC CHRONIC GOUT OF LEFT FOOT WITHOUT TOPHUS: ICD-10-CM

## 2024-12-20 DIAGNOSIS — Z12.5 PROSTATE CANCER SCREENING: ICD-10-CM

## 2024-12-20 PROCEDURE — 99396 PREV VISIT EST AGE 40-64: CPT | Performed by: FAMILY MEDICINE

## 2024-12-20 RX ORDER — ROSUVASTATIN CALCIUM 20 MG/1
20 TABLET, COATED ORAL NIGHTLY
Qty: 90 TABLET | Refills: 3 | Status: SHIPPED | OUTPATIENT
Start: 2024-12-20

## 2024-12-20 RX ORDER — LOSARTAN POTASSIUM 50 MG/1
50 TABLET ORAL DAILY
Qty: 90 TABLET | Refills: 3 | Status: SHIPPED | OUTPATIENT
Start: 2024-12-20

## 2024-12-20 RX ORDER — METFORMIN HYDROCHLORIDE 500 MG/1
1000 TABLET, EXTENDED RELEASE ORAL
Qty: 180 TABLET | Refills: 3 | Status: SHIPPED | OUTPATIENT
Start: 2024-12-20

## 2024-12-20 RX ORDER — NITROGLYCERIN 0.4 MG/1
0.4 TABLET SUBLINGUAL
Qty: 25 TABLET | Refills: 1 | Status: SHIPPED | OUTPATIENT
Start: 2024-12-20

## 2024-12-20 RX ORDER — GLIPIZIDE 5 MG/1
5 TABLET ORAL
Qty: 180 TABLET | Refills: 3 | Status: SHIPPED | OUTPATIENT
Start: 2024-12-20

## 2024-12-20 RX ORDER — ALLOPURINOL 100 MG/1
200 TABLET ORAL DAILY
Qty: 180 TABLET | Refills: 3 | Status: SHIPPED | OUTPATIENT
Start: 2024-12-20

## 2024-12-20 RX ORDER — METOPROLOL TARTRATE 25 MG/1
25 TABLET, FILM COATED ORAL EVERY 12 HOURS
Qty: 180 TABLET | Refills: 1 | Status: SHIPPED | OUTPATIENT
Start: 2024-12-20

## 2024-12-20 NOTE — PROGRESS NOTES
Man Gill presents to [facility].    Chief Complaint:  Annual physical, diabetes, blood pressure, cholesterol    TELEHEALTH VISIT:  Mode of Visit:  Video  Location of patient:  Home  Location of provider: Personal residence  You have chosen to receive care through a telehealth visit.  Does the patient consent to use a video/audio connection for your medical care today?  Yes.  The visit included audio and video interaction.  No technical issues occurred during this visit.    Subjective   History of Present Illness:  Gera is being seen today for annual physical and follow-up.  He is 59 years old and works for [employer] where he has been for about 6 weeks.  Attempt stop smoking in 1996.  Gera is not currently drinking alcohol.  He is due for some type of colon cancer screening.  His colonoscopy was most recently done in 2013 and was normal.  He has no family history of colon cancer.  Gera is also due for PSA.     Gera also has type 2 diabetes for which he currently takes metformin and glipizide.  He says his blood sugar typically runs around 160.  He denies any low blood sugars.  His most recent A1c was in June and was 9.6%.    He also has hypertension, elevated cholesterol, known coronary artery disease, and gout.  He remains on treatment for these problems.  He says his blood pressure typically runs in a normal range.  He is unaware of obvious side effects from his medications.  He has not had any recent flaring of gout.    Review of Systems:  Review of Systems   Constitutional:  Negative for chills and fever.   HENT:  Positive for congestion.    Respiratory:  Negative for cough and shortness of breath.    Cardiovascular:  Negative for chest pain and palpitations.   Gastrointestinal:  Negative for abdominal pain, nausea and vomiting.      Objective   Medical History:  Past Medical History:    Coronary artery disease    Essential hypertension    Gout    Kidney stones    Mixed  hyperlipidemia    STEMI (ST elevation myocardial infarction)    23 3 STENTS PLACED    Type 2 diabetes mellitus    AVERAGE 'S     Past Surgical History:    CARDIAC CATHETERIZATION    Procedure: Left Heart Cath;  Surgeon: Jin Harrison MD;  Location: [facility] CATH INVASIVE LOCATION;  Service: Cardiology;  Laterality: N/A;    CARDIAC CATHETERIZATION    Procedure: Percutaneous Coronary Intervention;  Surgeon: Jin Harrison MD;  Location: [facility] CATH INVASIVE LOCATION;  Service: Cardiology;  Laterality: N/A;    CARDIAC CATHETERIZATION    Procedure: Left Heart Cath with possible coronary angioplasty;  Surgeon: Jin Harrison MD;  Location: [facility] CATH INVASIVE LOCATION;  Service: Cardiology;  Laterality: N/A;    COLONOSCOPY    Normal      Family History   Problem Relation Age of Onset    Hypertension Mother     Coronary artery disease Father     Hypertension Father     Heart disease Father     Coronary artery disease Brother      Social History     Tobacco Use    Smoking status: Former     Current packs/day: 0.00     Average packs/day: 1 pack/day for 20.0 years (20.0 ttl pk-yrs)     Types: Cigarettes     Start date: 1976     Quit date: 1996     Years since quittin.9     Passive exposure: Current    Smokeless tobacco: Never   Substance Use Topics    Alcohol use: Not Currently       Health Maintenance Due   Topic Date Due    DIABETIC EYE EXAM  Never done    COLORECTAL CANCER SCREENING  11/15/2023    DIABETIC FOOT EXAM  2024    HEMOGLOBIN A1C  2024        Immunization History   Administered Date(s) Administered    Td (TDVAX) 2004    Tdap 08/10/2012       No Known Allergies     Medications:    Current Outpatient Medications:     Accu-Chek FastClix Lancets misc, Check blood sugar once daily., Disp: 100 each, Rfl: 3    albuterol sulfate  (90 Base) MCG/ACT inhaler, Inhale 2 puffs Every 4 (Four) Hours As Needed for Wheezing or Shortness of Air (cough)., Disp:  18 g, Rfl: 2    allopurinol (ZYLOPRIM) 100 MG tablet, Take 2 tablets by mouth Daily., Disp: 180 tablet, Rfl: 3    aspirin 81 MG EC tablet, Take 1 tablet by mouth Daily., Disp: , Rfl:     glipizide (GLUCOTROL) 5 MG tablet, Take 1 tablet by mouth 2 (Two) Times a Day Before Meals., Disp: 180 tablet, Rfl: 3    glucose blood (Accu-Chek Guide) test strip, Check blood sugar once daily., Disp: 100 each, Rfl: 3    losartan (COZAAR) 50 MG tablet, Take 1 tablet by mouth Daily., Disp: 90 tablet, Rfl: 3    metFORMIN ER (GLUCOPHAGE-XR) 500 MG 24 hr tablet, Take 2 tablets by mouth Daily With Dinner., Disp: 180 tablet, Rfl: 3    metoprolol tartrate (LOPRESSOR) 25 MG tablet, Take 1 tablet by mouth Every 12 (Twelve) Hours., Disp: 180 tablet, Rfl: 1    nitroglycerin (NITROSTAT) 0.4 MG SL tablet, Place 1 tablet under the tongue Every 5 (Five) Minutes As Needed for Chest Pain. Take no more than 3 doses in 15 minutes., Disp: 25 tablet, Rfl: 1    rosuvastatin (CRESTOR) 20 MG tablet, Take 1 tablet by mouth Every Night., Disp: 90 tablet, Rfl: 3    Vital Signs:   There were no vitals taken for this visit.      Physical Exam:  Physical Exam  Vitals reviewed.   Constitutional:       General: He is not in acute distress.     Appearance: He is not ill-appearing.   Eyes:      Pupils: Pupils are equal, round, and reactive to light.   Neck:      Comments: No visible finding  Pulmonary:      Effort: Pulmonary effort is normal.   Musculoskeletal:      Cervical back: Neck supple.   Lymphadenopathy:      Cervical: No cervical adenopathy.   Skin:     Findings: No lesion or rash.   Neurological:      General: No focal deficit present.      Mental Status: He is alert.      Cranial Nerves: No cranial nerve deficit.      Motor: No weakness.   Psychiatric:         Mood and Affect: Mood normal.         Behavior: Behavior normal.     Result Review   The following data was reviewed by Vadim Mishra MD on 12/20/2024.  Lab Results   Component Value Date     WBC 6.16 06/13/2024    HGB 13.7 06/13/2024    HCT 41.6 06/13/2024    MCV 86.1 06/13/2024     06/13/2024     Lab Results   Component Value Date    GLUCOSE 307 (H) 06/13/2024    BUN 18 06/13/2024    CREATININE 1.14 06/13/2024     06/13/2024    K 4.1 06/13/2024     06/13/2024    CALCIUM 9.1 06/13/2024    PROTEINTOT 6.7 06/13/2024    ALBUMIN 4.4 06/13/2024    ALT 37 06/13/2024    AST 22 06/13/2024    ALKPHOS 61 06/13/2024    BILITOT 0.3 06/13/2024    GLOB 2.3 06/13/2024    AGRATIO 1.9 06/13/2024    BCR 15.8 06/13/2024    ANIONGAP 12.9 06/13/2024    EGFR 73.6 06/13/2024     Lab Results   Component Value Date    CHOL 106 06/13/2024    CHLPL 151 04/03/2021    TRIG 235 (H) 06/13/2024    HDL 35 (L) 06/13/2024    LDL 35 06/13/2024     Lab Results   Component Value Date    TSH 1.010 12/08/2023     Lab Results   Component Value Date    HGBA1C 9.60 (H) 06/13/2024     Lab Results   Component Value Date    PSA 0.430 07/15/2023    PSA 0.444 05/17/2022    PSA 0.51 04/03/2021          Assessment and Plan:   Today, we have reviewed his care.  Overall, Gera seems well.  Regarding the annual physical, he is due for colon cancer screening.  After reviewing its limitations, we will move ahead with Aleta.  PSA will also be obtained.  We also reviewed his other care and will refill his medications and update labs.  I suspect additional treatments for diabetes will be in order.  Otherwise, we will plan to see him back in about 6 months, sooner if needed.    Diagnoses and all orders for this visit:    1. Physical exam (Primary)  -     Comprehensive Metabolic Panel; Future  -     TSH; Future  -     PSA Screen; Future  -     Hemoglobin A1c; Future  -     Uric Acid; Future  -     Cologuard - Stool, Per Rectum; Future  -     Urinalysis With Microscopic - Urine, Clean Catch; Future    2. Type 2 diabetes mellitus with hyperglycemia, without long-term current use of insulin  -     glipizide (GLUCOTROL) 5 MG tablet; Take 1  tablet by mouth 2 (Two) Times a Day Before Meals.  Dispense: 180 tablet; Refill: 3  -     metFORMIN ER (GLUCOPHAGE-XR) 500 MG 24 hr tablet; Take 2 tablets by mouth Daily With Dinner.  Dispense: 180 tablet; Refill: 3  -     Comprehensive Metabolic Panel; Future  -     Hemoglobin A1c; Future  -     Urinalysis With Microscopic - Urine, Clean Catch; Future    3. Essential hypertension  -     losartan (COZAAR) 50 MG tablet; Take 1 tablet by mouth Daily.  Dispense: 90 tablet; Refill: 3  -     metoprolol tartrate (LOPRESSOR) 25 MG tablet; Take 1 tablet by mouth Every 12 (Twelve) Hours.  Dispense: 180 tablet; Refill: 1  -     Comprehensive Metabolic Panel; Future    4. Mixed hyperlipidemia  -     rosuvastatin (CRESTOR) 20 MG tablet; Take 1 tablet by mouth Every Night.  Dispense: 90 tablet; Refill: 3  -     Comprehensive Metabolic Panel; Future    5. Coronary artery disease involving native coronary artery of native heart without angina pectoris  -     metoprolol tartrate (LOPRESSOR) 25 MG tablet; Take 1 tablet by mouth Every 12 (Twelve) Hours.  Dispense: 180 tablet; Refill: 1  -     nitroglycerin (NITROSTAT) 0.4 MG SL tablet; Place 1 tablet under the tongue Every 5 (Five) Minutes As Needed for Chest Pain. Take no more than 3 doses in 15 minutes.  Dispense: 25 tablet; Refill: 1  -     rosuvastatin (CRESTOR) 20 MG tablet; Take 1 tablet by mouth Every Night.  Dispense: 90 tablet; Refill: 3    6. Idiopathic chronic gout of left foot without tophus  -     allopurinol (ZYLOPRIM) 100 MG tablet; Take 2 tablets by mouth Daily.  Dispense: 180 tablet; Refill: 3  -     Uric Acid; Future    7. Other long term (current) drug therapy  -     Uric Acid; Future    8. Prostate cancer screening  -     PSA Screen; Future    9. Screen for colon cancer  -     Cologuard - Stool, Per Rectum; Future    Follow Up  Return in about 6 months (around 6/20/2025) for Recheck, Next scheduled follow up.  Patient was given instructions and counseling  regarding his condition or for health maintenance advice. Please see specific information pulled into the AVS if appropriate.

## 2024-12-26 ENCOUNTER — LAB (OUTPATIENT)
Dept: LAB | Facility: HOSPITAL | Age: 59
End: 2024-12-26
Payer: COMMERCIAL

## 2024-12-26 DIAGNOSIS — E78.2 MIXED HYPERLIPIDEMIA: ICD-10-CM

## 2024-12-26 DIAGNOSIS — Z00.00 PHYSICAL EXAM: ICD-10-CM

## 2024-12-26 DIAGNOSIS — M1A.0720 IDIOPATHIC CHRONIC GOUT OF LEFT FOOT WITHOUT TOPHUS: ICD-10-CM

## 2024-12-26 DIAGNOSIS — E11.65 TYPE 2 DIABETES MELLITUS WITH HYPERGLYCEMIA, WITHOUT LONG-TERM CURRENT USE OF INSULIN: ICD-10-CM

## 2024-12-26 DIAGNOSIS — Z12.5 PROSTATE CANCER SCREENING: ICD-10-CM

## 2024-12-26 DIAGNOSIS — I10 ESSENTIAL HYPERTENSION: ICD-10-CM

## 2024-12-26 DIAGNOSIS — Z79.899 OTHER LONG TERM (CURRENT) DRUG THERAPY: ICD-10-CM

## 2024-12-26 LAB
ALBUMIN SERPL-MCNC: 4.3 G/DL (ref 3.5–5.2)
ALBUMIN/GLOB SERPL: 1.7 G/DL
ALP SERPL-CCNC: 66 U/L (ref 39–117)
ALT SERPL W P-5'-P-CCNC: 35 U/L (ref 1–41)
ANION GAP SERPL CALCULATED.3IONS-SCNC: 12.3 MMOL/L (ref 5–15)
AST SERPL-CCNC: 24 U/L (ref 1–40)
BACTERIA UR QL AUTO: NORMAL /HPF
BILIRUB SERPL-MCNC: 0.5 MG/DL (ref 0–1.2)
BILIRUB UR QL STRIP: NEGATIVE
BUN SERPL-MCNC: 13 MG/DL (ref 8–23)
BUN/CREAT SERPL: 11.8 (ref 7–25)
CALCIUM SPEC-SCNC: 8.8 MG/DL (ref 8.6–10.5)
CHLORIDE SERPL-SCNC: 99 MMOL/L (ref 98–107)
CLARITY UR: CLEAR
CO2 SERPL-SCNC: 26.7 MMOL/L (ref 22–29)
COLOR UR: YELLOW
CREAT SERPL-MCNC: 1.1 MG/DL (ref 0.76–1.27)
EGFRCR SERPLBLD CKD-EPI 2021: 76.4 ML/MIN/1.73
GLOBULIN UR ELPH-MCNC: 2.5 GM/DL
GLUCOSE SERPL-MCNC: 378 MG/DL (ref 65–99)
GLUCOSE UR STRIP-MCNC: ABNORMAL MG/DL
HBA1C MFR BLD: 9.4 % (ref 4.8–5.6)
HGB UR QL STRIP.AUTO: NEGATIVE
KETONES UR QL STRIP: NEGATIVE
LEUKOCYTE ESTERASE UR QL STRIP.AUTO: NEGATIVE
MUCOUS THREADS URNS QL MICRO: NORMAL /HPF
NITRITE UR QL STRIP: NEGATIVE
PH UR STRIP.AUTO: 5.5 [PH] (ref 5–8)
POTASSIUM SERPL-SCNC: 4.5 MMOL/L (ref 3.5–5.2)
PROT SERPL-MCNC: 6.8 G/DL (ref 6–8.5)
PROT UR QL STRIP: NEGATIVE
PSA SERPL-MCNC: 0.46 NG/ML (ref 0–4)
RBC # UR STRIP: NORMAL /HPF
REF LAB TEST METHOD: NORMAL
SODIUM SERPL-SCNC: 138 MMOL/L (ref 136–145)
SP GR UR STRIP: 1.02 (ref 1–1.03)
SQUAMOUS #/AREA URNS HPF: NORMAL /HPF
TSH SERPL DL<=0.05 MIU/L-ACNC: 2.39 UIU/ML (ref 0.27–4.2)
URATE SERPL-MCNC: 2.7 MG/DL (ref 3.4–7)
UROBILINOGEN UR QL STRIP: ABNORMAL
WBC # UR STRIP: NORMAL /HPF

## 2024-12-26 PROCEDURE — 36415 COLL VENOUS BLD VENIPUNCTURE: CPT

## 2024-12-26 PROCEDURE — 84443 ASSAY THYROID STIM HORMONE: CPT

## 2024-12-26 PROCEDURE — 81001 URINALYSIS AUTO W/SCOPE: CPT

## 2024-12-26 PROCEDURE — G0103 PSA SCREENING: HCPCS

## 2024-12-26 PROCEDURE — 84550 ASSAY OF BLOOD/URIC ACID: CPT

## 2024-12-26 PROCEDURE — 83036 HEMOGLOBIN GLYCOSYLATED A1C: CPT

## 2024-12-26 PROCEDURE — 80053 COMPREHEN METABOLIC PANEL: CPT

## 2025-01-06 ENCOUNTER — HOSPITAL ENCOUNTER (OUTPATIENT)
Dept: REHABILITATION | Age: 60
Discharge: STILL ADMITTED OR ADMINISTRATIVE | End: 2025-01-06
Attending: ORTHOPAEDIC SURGERY

## 2025-01-06 PROCEDURE — 10004173 HB COUNTER-THERAPY VISIT PT: Performed by: PHYSICAL THERAPIST

## 2025-01-06 PROCEDURE — 97161 PT EVAL LOW COMPLEX 20 MIN: CPT | Performed by: PHYSICAL THERAPIST

## 2025-01-06 PROCEDURE — 97110 THERAPEUTIC EXERCISES: CPT | Performed by: PHYSICAL THERAPIST

## 2025-01-06 ASSESSMENT — ENCOUNTER SYMPTOMS
PAIN SEVERITY NOW: 1
QUALITY: SORE
QUALITY: TINGLING
PAIN SCALE AT HIGHEST: 5
SUBJECTIVE PAIN PROGRESSION: NO CHANGE
QUALITY: STIFF
QUALITY: TIGHT

## 2025-01-08 ENCOUNTER — TELEPHONE (OUTPATIENT)
Dept: UROLOGY | Age: 60
End: 2025-01-08

## 2025-01-08 ENCOUNTER — APPOINTMENT (OUTPATIENT)
Dept: ELECTROPHYSIOLOGY | Age: 60
End: 2025-01-08
Attending: INTERNAL MEDICINE

## 2025-01-08 ENCOUNTER — E-ADVICE (OUTPATIENT)
Dept: UROLOGY | Age: 60
End: 2025-01-08

## 2025-01-08 DIAGNOSIS — Z95.0 PACEMAKER: ICD-10-CM

## 2025-01-08 DIAGNOSIS — R00.1 BRADYCARDIA: ICD-10-CM

## 2025-01-08 DIAGNOSIS — I49.5 SINOATRIAL NODE DYSFUNCTION  (CMD): ICD-10-CM

## 2025-01-08 DIAGNOSIS — I47.10 SVT (SUPRAVENTRICULAR TACHYCARDIA) (CMD): ICD-10-CM

## 2025-01-09 ENCOUNTER — APPOINTMENT (OUTPATIENT)
Dept: UROLOGY | Age: 60
End: 2025-01-09

## 2025-01-09 LAB
MDC_IDC_EPISODE_DTM: NORMAL
MDC_IDC_EPISODE_DURATION: 104 S
MDC_IDC_EPISODE_DURATION: 118 S
MDC_IDC_EPISODE_DURATION: 15 S
MDC_IDC_EPISODE_DURATION: 15 S
MDC_IDC_EPISODE_DURATION: 20 S
MDC_IDC_EPISODE_DURATION: 26 S
MDC_IDC_EPISODE_DURATION: 35 S
MDC_IDC_EPISODE_DURATION: 41 S
MDC_IDC_EPISODE_DURATION: 43 S
MDC_IDC_EPISODE_DURATION: 45 S
MDC_IDC_EPISODE_DURATION: 45 S
MDC_IDC_EPISODE_DURATION: 47 S
MDC_IDC_EPISODE_DURATION: 63 S
MDC_IDC_EPISODE_DURATION: 75 S
MDC_IDC_EPISODE_DURATION: 76 S
MDC_IDC_EPISODE_DURATION: 77 S
MDC_IDC_EPISODE_DURATION: 92 S
MDC_IDC_EPISODE_DURATION: 974 S
MDC_IDC_EPISODE_ID: 1128
MDC_IDC_EPISODE_ID: 1129
MDC_IDC_EPISODE_ID: 1130
MDC_IDC_EPISODE_ID: 1131
MDC_IDC_EPISODE_ID: 1132
MDC_IDC_EPISODE_ID: 1133
MDC_IDC_EPISODE_ID: 1134
MDC_IDC_EPISODE_ID: 1135
MDC_IDC_EPISODE_ID: 1136
MDC_IDC_EPISODE_ID: 1137
MDC_IDC_EPISODE_ID: 1138
MDC_IDC_EPISODE_ID: 1139
MDC_IDC_EPISODE_ID: 1140
MDC_IDC_EPISODE_ID: 1141
MDC_IDC_EPISODE_ID: 1142
MDC_IDC_EPISODE_ID: 1143
MDC_IDC_EPISODE_ID: 1144
MDC_IDC_EPISODE_ID: 1145
MDC_IDC_EPISODE_TYPE: NORMAL
MDC_IDC_MSMT_BATTERY_DTM: NORMAL
MDC_IDC_MSMT_BATTERY_REMAINING_LONGEVITY: 36 MO
MDC_IDC_MSMT_BATTERY_RRT_TRIGGER: 2.83
MDC_IDC_MSMT_BATTERY_STATUS: NORMAL
MDC_IDC_MSMT_BATTERY_VOLTAGE: 2.94 V
MDC_IDC_MSMT_LEADCHNL_RA_IMPEDANCE_VALUE: 418 OHM
MDC_IDC_MSMT_LEADCHNL_RA_IMPEDANCE_VALUE: 456 OHM
MDC_IDC_MSMT_LEADCHNL_RA_PACING_THRESHOLD_AMPLITUDE: 0.5 V
MDC_IDC_MSMT_LEADCHNL_RA_PACING_THRESHOLD_PULSEWIDTH: 0.4 MS
MDC_IDC_MSMT_LEADCHNL_RA_SENSING_INTR_AMPL: 2.25 MV
MDC_IDC_MSMT_LEADCHNL_RA_SENSING_INTR_AMPL: 2.25 MV
MDC_IDC_MSMT_LEADCHNL_RV_IMPEDANCE_VALUE: 380 OHM
MDC_IDC_MSMT_LEADCHNL_RV_IMPEDANCE_VALUE: 456 OHM
MDC_IDC_MSMT_LEADCHNL_RV_PACING_THRESHOLD_AMPLITUDE: 0.88 V
MDC_IDC_MSMT_LEADCHNL_RV_PACING_THRESHOLD_PULSEWIDTH: 0.4 MS
MDC_IDC_MSMT_LEADCHNL_RV_SENSING_INTR_AMPL: 3.75 MV
MDC_IDC_MSMT_LEADCHNL_RV_SENSING_INTR_AMPL: 3.75 MV
MDC_IDC_PG_IMPLANT_DTM: NORMAL
MDC_IDC_PG_MFG: NORMAL
MDC_IDC_PG_MODEL: NORMAL
MDC_IDC_PG_SERIAL: NORMAL
MDC_IDC_PG_TYPE: NORMAL
MDC_IDC_SESS_CLINIC_NAME: NORMAL
MDC_IDC_SESS_DTM: NORMAL
MDC_IDC_SESS_TYPE: NORMAL
MDC_IDC_SET_BRADY_AT_MODE_SWITCH_RATE: 162 {BEATS}/MIN
MDC_IDC_SET_BRADY_HYSTRATE: NORMAL
MDC_IDC_SET_BRADY_LOWRATE: 50 {BEATS}/MIN
MDC_IDC_SET_BRADY_MAX_SENSOR_RATE: 130 {BEATS}/MIN
MDC_IDC_SET_BRADY_MAX_TRACKING_RATE: 130 {BEATS}/MIN
MDC_IDC_SET_BRADY_MODE: NORMAL
MDC_IDC_SET_BRADY_PAV_DELAY_LOW: 180 MS
MDC_IDC_SET_BRADY_SAV_DELAY_LOW: 150 MS
MDC_IDC_SET_LEADCHNL_RA_PACING_AMPLITUDE: 1.5 V
MDC_IDC_SET_LEADCHNL_RA_PACING_ANODE_ELECTRODE_1: NORMAL
MDC_IDC_SET_LEADCHNL_RA_PACING_ANODE_LOCATION_1: NORMAL
MDC_IDC_SET_LEADCHNL_RA_PACING_CAPTURE_MODE: NORMAL
MDC_IDC_SET_LEADCHNL_RA_PACING_CATHODE_ELECTRODE_1: NORMAL
MDC_IDC_SET_LEADCHNL_RA_PACING_CATHODE_LOCATION_1: NORMAL
MDC_IDC_SET_LEADCHNL_RA_PACING_POLARITY: NORMAL
MDC_IDC_SET_LEADCHNL_RA_PACING_PULSEWIDTH: 0.4 MS
MDC_IDC_SET_LEADCHNL_RA_SENSING_ANODE_ELECTRODE_1: NORMAL
MDC_IDC_SET_LEADCHNL_RA_SENSING_ANODE_LOCATION_1: NORMAL
MDC_IDC_SET_LEADCHNL_RA_SENSING_CATHODE_ELECTRODE_1: NORMAL
MDC_IDC_SET_LEADCHNL_RA_SENSING_CATHODE_LOCATION_1: NORMAL
MDC_IDC_SET_LEADCHNL_RA_SENSING_POLARITY: NORMAL
MDC_IDC_SET_LEADCHNL_RA_SENSING_SENSITIVITY: 0.6 MV
MDC_IDC_SET_LEADCHNL_RV_PACING_AMPLITUDE: 2 V
MDC_IDC_SET_LEADCHNL_RV_PACING_ANODE_ELECTRODE_1: NORMAL
MDC_IDC_SET_LEADCHNL_RV_PACING_ANODE_LOCATION_1: NORMAL
MDC_IDC_SET_LEADCHNL_RV_PACING_CAPTURE_MODE: NORMAL
MDC_IDC_SET_LEADCHNL_RV_PACING_CATHODE_ELECTRODE_1: NORMAL
MDC_IDC_SET_LEADCHNL_RV_PACING_CATHODE_LOCATION_1: NORMAL
MDC_IDC_SET_LEADCHNL_RV_PACING_POLARITY: NORMAL
MDC_IDC_SET_LEADCHNL_RV_PACING_PULSEWIDTH: 0.4 MS
MDC_IDC_SET_LEADCHNL_RV_SENSING_ANODE_ELECTRODE_1: NORMAL
MDC_IDC_SET_LEADCHNL_RV_SENSING_ANODE_LOCATION_1: NORMAL
MDC_IDC_SET_LEADCHNL_RV_SENSING_CATHODE_ELECTRODE_1: NORMAL
MDC_IDC_SET_LEADCHNL_RV_SENSING_CATHODE_LOCATION_1: NORMAL
MDC_IDC_SET_LEADCHNL_RV_SENSING_POLARITY: NORMAL
MDC_IDC_SET_LEADCHNL_RV_SENSING_SENSITIVITY: 0.9 MV
MDC_IDC_SET_ZONE_DETECTION_INTERVAL: 370 MS
MDC_IDC_SET_ZONE_DETECTION_INTERVAL: 400 MS
MDC_IDC_SET_ZONE_STATUS: NORMAL
MDC_IDC_SET_ZONE_STATUS: NORMAL
MDC_IDC_SET_ZONE_TYPE: NORMAL
MDC_IDC_SET_ZONE_VENDOR_TYPE: NORMAL
MDC_IDC_STAT_AT_BURDEN_PERCENT: 0 %
MDC_IDC_STAT_AT_DTM_END: NORMAL
MDC_IDC_STAT_AT_DTM_START: NORMAL
MDC_IDC_STAT_BRADY_AP_VP_PERCENT: 0 %
MDC_IDC_STAT_BRADY_AP_VS_PERCENT: 0.64 %
MDC_IDC_STAT_BRADY_AS_VP_PERCENT: 0.03 %
MDC_IDC_STAT_BRADY_AS_VS_PERCENT: 99.33 %
MDC_IDC_STAT_BRADY_DTM_END: NORMAL
MDC_IDC_STAT_BRADY_DTM_START: NORMAL
MDC_IDC_STAT_BRADY_RA_PERCENT_PACED: 0.65 %
MDC_IDC_STAT_BRADY_RV_PERCENT_PACED: 0.03 %
MDC_IDC_STAT_EPISODE_RECENT_COUNT: 0
MDC_IDC_STAT_EPISODE_RECENT_COUNT: 13
MDC_IDC_STAT_EPISODE_RECENT_COUNT_DTM_END: NORMAL
MDC_IDC_STAT_EPISODE_RECENT_COUNT_DTM_START: NORMAL
MDC_IDC_STAT_EPISODE_TOTAL_COUNT: 0
MDC_IDC_STAT_EPISODE_TOTAL_COUNT: 1138
MDC_IDC_STAT_EPISODE_TOTAL_COUNT: 2
MDC_IDC_STAT_EPISODE_TOTAL_COUNT: 4
MDC_IDC_STAT_EPISODE_TOTAL_COUNT_DTM_END: NORMAL
MDC_IDC_STAT_EPISODE_TOTAL_COUNT_DTM_START: NORMAL
MDC_IDC_STAT_EPISODE_TYPE: NORMAL
MDC_IDC_STAT_TACHYTHERAPY_RECENT_DTM_END: NORMAL
MDC_IDC_STAT_TACHYTHERAPY_RECENT_DTM_START: NORMAL
MDC_IDC_STAT_TACHYTHERAPY_TOTAL_DTM_END: NORMAL
MDC_IDC_STAT_TACHYTHERAPY_TOTAL_DTM_START: NORMAL

## 2025-01-09 PROCEDURE — 93296 REM INTERROG EVL PM/IDS: CPT | Performed by: INTERNAL MEDICINE

## 2025-01-09 PROCEDURE — 93294 REM INTERROG EVL PM/LDLS PM: CPT | Performed by: INTERNAL MEDICINE

## 2025-01-10 ENCOUNTER — HOSPITAL ENCOUNTER (OUTPATIENT)
Dept: REHABILITATION | Age: 60
Discharge: STILL ADMITTED OR ADMINISTRATIVE | End: 2025-01-10
Attending: ORTHOPAEDIC SURGERY

## 2025-01-10 PROCEDURE — 97140 MANUAL THERAPY 1/> REGIONS: CPT

## 2025-01-10 PROCEDURE — 10004173 HB COUNTER-THERAPY VISIT PT

## 2025-01-10 PROCEDURE — 97110 THERAPEUTIC EXERCISES: CPT

## 2025-01-10 ASSESSMENT — ENCOUNTER SYMPTOMS: PAIN SEVERITY NOW: 1

## 2025-01-16 DIAGNOSIS — F51.01 PRIMARY INSOMNIA: ICD-10-CM

## 2025-01-16 RX ORDER — TIZANIDINE 2 MG/1
TABLET ORAL
Qty: 120 TABLET | Refills: 2 | Status: SHIPPED | OUTPATIENT
Start: 2025-01-16

## 2025-01-16 RX ORDER — ZOLPIDEM TARTRATE 10 MG/1
10 TABLET ORAL NIGHTLY
Qty: 90 TABLET | Refills: 0 | Status: SHIPPED | OUTPATIENT
Start: 2025-01-16

## 2025-01-17 ENCOUNTER — APPOINTMENT (OUTPATIENT)
Dept: FAMILY MEDICINE | Age: 60
End: 2025-01-17

## 2025-01-24 ENCOUNTER — TELEPHONE (OUTPATIENT)
Dept: FAMILY MEDICINE CLINIC | Age: 60
End: 2025-01-24
Payer: COMMERCIAL

## 2025-01-24 DIAGNOSIS — E11.65 TYPE 2 DIABETES MELLITUS WITH HYPERGLYCEMIA, WITHOUT LONG-TERM CURRENT USE OF INSULIN: Primary | ICD-10-CM

## 2025-01-24 NOTE — TELEPHONE ENCOUNTER
----- Message from Jakcie EASLEY sent at 12/27/2024  9:26 AM EST -----  Was he able to get Mounjaro and start on it? If so, increase dose?

## 2025-01-24 NOTE — TELEPHONE ENCOUNTER
Pt states he is tolerating med well and agreeable to dose increase. He states sugars of late have been between 110-120. Rx pended.

## 2025-01-28 ENCOUNTER — OFFICE VISIT (OUTPATIENT)
Dept: CARDIOLOGY | Facility: CLINIC | Age: 60
End: 2025-01-28
Payer: COMMERCIAL

## 2025-01-28 VITALS
HEIGHT: 70 IN | BODY MASS INDEX: 27.06 KG/M2 | DIASTOLIC BLOOD PRESSURE: 90 MMHG | HEART RATE: 70 BPM | SYSTOLIC BLOOD PRESSURE: 138 MMHG | WEIGHT: 189 LBS

## 2025-01-28 DIAGNOSIS — I10 PRIMARY HYPERTENSION: ICD-10-CM

## 2025-01-28 DIAGNOSIS — I25.10 CAD S/P PERCUTANEOUS CORONARY ANGIOPLASTY: Primary | ICD-10-CM

## 2025-01-28 DIAGNOSIS — E78.2 MIXED HYPERLIPIDEMIA: ICD-10-CM

## 2025-01-28 DIAGNOSIS — Z98.61 CAD S/P PERCUTANEOUS CORONARY ANGIOPLASTY: Primary | ICD-10-CM

## 2025-01-28 PROCEDURE — 99214 OFFICE O/P EST MOD 30 MIN: CPT

## 2025-01-28 NOTE — PROGRESS NOTES
Chief Complaint  Follow-up    Subjective        History of Present Illness  Man Gill presents to [facility] for follow up.   Patient is a 59-year-old male with past medical history outlined below, significant for coronary artery disease status post PCI in , hypertension, hyperlipidemia, type 2 diabetes on Mounjaro who presents for routine follow-up.  He is doing very well from a cardiac standpoint.  He denies any chest pain.  He notes no dyspnea, palpitations, edema or syncope.    Past Medical History:   Diagnosis Date    Coronary artery disease     Essential hypertension     Gout     Kidney stones     Mixed hyperlipidemia     STEMI (ST elevation myocardial infarction)     23 3 STENTS PLACED    Type 2 diabetes mellitus     AVERAGE 'S       ALLERGY  No Known Allergies     Past Surgical History:   Procedure Laterality Date    CARDIAC CATHETERIZATION N/A 2023    Procedure: Left Heart Cath;  Surgeon: Jin Harrison MD;  Location: [facility] CATH INVASIVE LOCATION;  Service: Cardiology;  Laterality: N/A;    CARDIAC CATHETERIZATION N/A 2023    Procedure: Percutaneous Coronary Intervention;  Surgeon: Jin Harrison MD;  Location: [facility] CATH INVASIVE LOCATION;  Service: Cardiology;  Laterality: N/A;    CARDIAC CATHETERIZATION N/A 2023    Procedure: Left Heart Cath with possible coronary angioplasty;  Surgeon: Jin Harrison MD;  Location: [facility] CATH INVASIVE LOCATION;  Service: Cardiology;  Laterality: N/A;    COLONOSCOPY  11/15/2013    Normal        Social History     Socioeconomic History    Marital status:      Spouse name: Mariely    Number of children: 1   Tobacco Use    Smoking status: Former     Current packs/day: 0.00     Average packs/day: 1 pack/day for 20.0 years (20.0 ttl pk-yrs)     Types: Cigarettes     Start date: 1976     Quit date: 1996     Years since quittin.0     Passive exposure: Current     "Smokeless tobacco: Never   Vaping Use    Vaping status: Never Used   Substance and Sexual Activity    Alcohol use: Not Currently    Drug use: Never    Sexual activity: Defer       Family History   Problem Relation Age of Onset    Hypertension Mother     Coronary artery disease Father     Hypertension Father     Heart disease Father     Coronary artery disease Brother         Current Outpatient Medications on File Prior to Visit   Medication Sig    Accu-Chek FastClix Lancets misc Check blood sugar once daily.    albuterol sulfate  (90 Base) MCG/ACT inhaler Inhale 2 puffs Every 4 (Four) Hours As Needed for Wheezing or Shortness of Air (cough).    allopurinol (ZYLOPRIM) 100 MG tablet Take 2 tablets by mouth Daily.    aspirin 81 MG EC tablet Take 1 tablet by mouth Daily.    glipizide (GLUCOTROL) 5 MG tablet Take 1 tablet by mouth 2 (Two) Times a Day Before Meals.    glucose blood (Accu-Chek Guide) test strip Check blood sugar once daily.    losartan (COZAAR) 50 MG tablet Take 1 tablet by mouth Daily.    metFORMIN ER (GLUCOPHAGE-XR) 500 MG 24 hr tablet Take 2 tablets by mouth Daily With Dinner.    metoprolol tartrate (LOPRESSOR) 25 MG tablet Take 1 tablet by mouth Every 12 (Twelve) Hours.    nitroglycerin (NITROSTAT) 0.4 MG SL tablet Place 1 tablet under the tongue Every 5 (Five) Minutes As Needed for Chest Pain. Take no more than 3 doses in 15 minutes.    rosuvastatin (CRESTOR) 20 MG tablet Take 1 tablet by mouth Every Night.    Tirzepatide 5 MG/0.5ML solution auto-injector Inject 0.5 mL under the skin into the appropriate area as directed 1 (One) Time Per Week.     No current facility-administered medications on file prior to visit.       Objective   Vitals:    01/28/25 0903   BP: 138/90   BP Location: Left arm   Patient Position: Sitting   Cuff Size: Large Adult   Pulse: 70   Weight: 85.7 kg (189 lb)   Height: 177.8 cm (70\")       Physical Exam  Constitutional:       General: He is awake. He is not in acute " distress.     Appearance: Normal appearance.   HENT:      Head: Normocephalic.      Nose: Nose normal. No congestion.   Eyes:      Extraocular Movements: Extraocular movements intact.      Conjunctiva/sclera: Conjunctivae normal.      Pupils: Pupils are equal, round, and reactive to light.   Neck:      Thyroid: No thyromegaly.      Vascular: No JVD.   Cardiovascular:      Rate and Rhythm: Normal rate and regular rhythm.      Chest Wall: PMI is not displaced.      Pulses: Normal pulses.      Heart sounds: Normal heart sounds, S1 normal and S2 normal. No murmur heard.     No friction rub. No gallop. No S3 or S4 sounds.   Pulmonary:      Effort: Pulmonary effort is normal.      Breath sounds: Normal breath sounds. No wheezing, rhonchi or rales.   Abdominal:      General: Bowel sounds are normal.      Palpations: Abdomen is soft.      Tenderness: There is no abdominal tenderness.   Musculoskeletal:      Cervical back: No tenderness.      Right lower leg: No edema.      Left lower leg: No edema.   Lymphadenopathy:      Cervical: No cervical adenopathy.   Skin:     General: Skin is warm and dry.      Capillary Refill: Capillary refill takes less than 2 seconds.      Coloration: Skin is not cyanotic.      Findings: No petechiae or rash.      Nails: There is no clubbing.   Neurological:      Mental Status: He is alert.   Psychiatric:         Mood and Affect: Mood normal.         Behavior: Behavior is cooperative.           Result Review     The following data was reviewed by GUANAKO Montoya on 01/28/25.      CMP          6/13/2024    15:41 12/26/2024    10:49   CMP   Glucose 307  378    BUN 18  13    Creatinine 1.14  1.10    EGFR 73.6  76.4    Sodium 137  138    Potassium 4.1  4.5    Chloride 101  99    Calcium 9.1  8.8    Total Protein 6.7  6.8    Albumin 4.4  4.3    Globulin 2.3  2.5    Total Bilirubin 0.3  0.5    Alkaline Phosphatase 61  66    AST (SGOT) 22  24    ALT (SGPT) 37  35    Albumin/Globulin Ratio 1.9   1.7    BUN/Creatinine Ratio 15.8  11.8    Anion Gap 12.9  12.3      CBC w/diff          6/13/2024    15:41   CBC w/Diff   WBC 6.16    RBC 4.83    Hemoglobin 13.7    Hematocrit 41.6    MCV 86.1    MCH 28.4    MCHC 32.9    RDW 12.6    Platelets 172       Lipid Panel          6/13/2024    15:41   Lipid Panel   Total Cholesterol 106    Triglycerides 235    HDL Cholesterol 35    VLDL Cholesterol 36    LDL Cholesterol  35    LDL/HDL Ratio 0.69        Results for orders placed during the hospital encounter of 06/01/23    Adult Transthoracic Echo Complete W/ Cont if Necessary Per Protocol    Interpretation Summary    Left ventricular ejection fraction appears to be 51 - 55%.  Inferior wall appears to be moving normally.    Left ventricular wall thickness is consistent with borderline concentric hypertrophy.    Left ventricular diastolic function is consistent with (grade I) impaired relaxation.    No significant valvular disease.      No results found for this or any previous visit.          Procedures      Assessment & Plan  CAD S/P percutaneous coronary angioplasty  History of CAD status post PCI in 2023.  Continue 81 mg aspirin daily for life.  Patient denies any angina or anginal-like symptoms.  Primary hypertension  Blood pressure is marginal today but he reports good blood pressure control at home.  Continue current antihypertensive regimen.  Mixed hyperlipidemia  LDL is at goal.  Continue statin therapy.                     Follow Up   Return in about 6 months (around 7/28/2025) for With Dr. Harrison.    Patient was given instructions and counseling regarding his condition or for health maintenance advice. Please see specific information pulled into the AVS if appropriate.     Mallory Arevalo, APRN  01/28/25  09:13 EST    Dictated Utilizing Dragon Dictation

## 2025-01-31 ENCOUNTER — TELEPHONE (OUTPATIENT)
Dept: REHABILITATION | Age: 60
End: 2025-01-31

## 2025-02-14 ENCOUNTER — APPOINTMENT (OUTPATIENT)
Dept: DERMATOLOGY | Age: 60
End: 2025-02-14
Attending: PHYSICIAN ASSISTANT

## 2025-02-14 DIAGNOSIS — L82.1 SEBORRHEIC KERATOSIS: ICD-10-CM

## 2025-02-14 DIAGNOSIS — L57.0 AK (ACTINIC KERATOSIS): ICD-10-CM

## 2025-02-14 DIAGNOSIS — B07.8 OTHER VIRAL WARTS: Primary | ICD-10-CM

## 2025-02-26 DIAGNOSIS — E11.65 TYPE 2 DIABETES MELLITUS WITH HYPERGLYCEMIA, WITHOUT LONG-TERM CURRENT USE OF INSULIN: ICD-10-CM

## 2025-02-26 RX ORDER — TIRZEPATIDE 5 MG/.5ML
5 INJECTION, SOLUTION SUBCUTANEOUS WEEKLY
Qty: 4 ML | Refills: 2 | Status: SHIPPED | OUTPATIENT
Start: 2025-02-26

## 2025-02-26 NOTE — TELEPHONE ENCOUNTER
Noted.  I have sent refills at the current dose.  Confirm if he is currently taking glipizide.  If so, we should at least consider decreasing the dose of it given the potential for low blood sugars with it.  Thanks.

## 2025-02-26 NOTE — TELEPHONE ENCOUNTER
Pt states his blood sugars are running below 100 most of the time. He would prefer not to increase dose at this time.

## 2025-02-28 ENCOUNTER — APPOINTMENT (OUTPATIENT)
Dept: UROLOGY | Age: 60
End: 2025-02-28

## 2025-02-28 ENCOUNTER — LAB SERVICES (OUTPATIENT)
Dept: LAB | Age: 60
End: 2025-02-28

## 2025-02-28 ENCOUNTER — TELEPHONE (OUTPATIENT)
Dept: ELECTROPHYSIOLOGY | Age: 60
End: 2025-02-28

## 2025-02-28 VITALS
HEIGHT: 65 IN | WEIGHT: 206.79 LBS | SYSTOLIC BLOOD PRESSURE: 143 MMHG | HEART RATE: 71 BPM | BODY MASS INDEX: 34.45 KG/M2 | DIASTOLIC BLOOD PRESSURE: 92 MMHG

## 2025-02-28 DIAGNOSIS — R97.20 ELEVATED PSA: ICD-10-CM

## 2025-02-28 DIAGNOSIS — R97.20 ELEVATED PSA: Primary | ICD-10-CM

## 2025-02-28 PROCEDURE — 36415 COLL VENOUS BLD VENIPUNCTURE: CPT | Performed by: INTERNAL MEDICINE

## 2025-02-28 PROCEDURE — 99213 OFFICE O/P EST LOW 20 MIN: CPT | Performed by: UROLOGY

## 2025-02-28 PROCEDURE — 84153 ASSAY OF PSA TOTAL: CPT | Performed by: CLINICAL MEDICAL LABORATORY

## 2025-03-01 LAB — PSA SERPL-MCNC: 3.69 NG/ML

## 2025-03-06 ENCOUNTER — TELEPHONE (OUTPATIENT)
Dept: ELECTROPHYSIOLOGY | Age: 60
End: 2025-03-06

## 2025-03-07 ENCOUNTER — TELEPHONE (OUTPATIENT)
Dept: CARDIOLOGY | Age: 60
End: 2025-03-07

## 2025-04-14 ENCOUNTER — APPOINTMENT (OUTPATIENT)
Dept: ELECTROPHYSIOLOGY | Age: 60
End: 2025-04-14

## 2025-04-14 ENCOUNTER — PATIENT MESSAGE (OUTPATIENT)
Dept: FAMILY MEDICINE CLINIC | Age: 60
End: 2025-04-14
Payer: COMMERCIAL

## 2025-04-14 DIAGNOSIS — E11.65 TYPE 2 DIABETES MELLITUS WITH HYPERGLYCEMIA, WITHOUT LONG-TERM CURRENT USE OF INSULIN: ICD-10-CM

## 2025-04-15 RX ORDER — TIRZEPATIDE 7.5 MG/.5ML
7.5 INJECTION, SOLUTION SUBCUTANEOUS WEEKLY
Qty: 2 ML | Refills: 0 | Status: SHIPPED | OUTPATIENT
Start: 2025-04-15

## 2025-04-16 DIAGNOSIS — F51.01 PRIMARY INSOMNIA: ICD-10-CM

## 2025-04-16 RX ORDER — ZOLPIDEM TARTRATE 10 MG/1
10 TABLET ORAL NIGHTLY
Qty: 90 TABLET | Refills: 1 | Status: SHIPPED | OUTPATIENT
Start: 2025-04-16

## 2025-05-06 ENCOUNTER — APPOINTMENT (OUTPATIENT)
Dept: ELECTROPHYSIOLOGY | Age: 60
End: 2025-05-06

## 2025-05-06 ENCOUNTER — APPOINTMENT (OUTPATIENT)
Dept: CARDIOLOGY | Age: 60
End: 2025-05-06
Attending: INTERNAL MEDICINE

## 2025-05-06 VITALS
DIASTOLIC BLOOD PRESSURE: 74 MMHG | HEART RATE: 80 BPM | SYSTOLIC BLOOD PRESSURE: 130 MMHG | BODY MASS INDEX: 33.32 KG/M2 | HEIGHT: 65 IN | WEIGHT: 200 LBS

## 2025-05-06 DIAGNOSIS — R00.1 BRADYCARDIA: Primary | ICD-10-CM

## 2025-05-06 DIAGNOSIS — Z95.0 PACEMAKER: ICD-10-CM

## 2025-05-06 DIAGNOSIS — I49.5 SINOATRIAL NODE DYSFUNCTION  (CMD): ICD-10-CM

## 2025-05-06 DIAGNOSIS — I47.10 SVT (SUPRAVENTRICULAR TACHYCARDIA) (CMD): ICD-10-CM

## 2025-05-06 LAB
MDC_IDC_EPISODE_DTM: NORMAL
MDC_IDC_EPISODE_DURATION: 114 S
MDC_IDC_EPISODE_DURATION: 139 S
MDC_IDC_EPISODE_DURATION: 16 S
MDC_IDC_EPISODE_DURATION: 160 S
MDC_IDC_EPISODE_DURATION: 17 S
MDC_IDC_EPISODE_DURATION: 24 S
MDC_IDC_EPISODE_DURATION: 27 S
MDC_IDC_EPISODE_DURATION: 31 S
MDC_IDC_EPISODE_DURATION: 36 S
MDC_IDC_EPISODE_DURATION: 36 S
MDC_IDC_EPISODE_DURATION: 38 S
MDC_IDC_EPISODE_DURATION: 43 S
MDC_IDC_EPISODE_DURATION: 52 S
MDC_IDC_EPISODE_DURATION: 64 S
MDC_IDC_EPISODE_DURATION: 66 S
MDC_IDC_EPISODE_DURATION: 66 S
MDC_IDC_EPISODE_DURATION: 8 S
MDC_IDC_EPISODE_DURATION: 83 S
MDC_IDC_EPISODE_DURATION: 84 S
MDC_IDC_EPISODE_DURATION: 9 S
MDC_IDC_EPISODE_ID: 1146
MDC_IDC_EPISODE_ID: 1147
MDC_IDC_EPISODE_ID: 1148
MDC_IDC_EPISODE_ID: 1149
MDC_IDC_EPISODE_ID: 1150
MDC_IDC_EPISODE_ID: 1151
MDC_IDC_EPISODE_ID: 1152
MDC_IDC_EPISODE_ID: 1153
MDC_IDC_EPISODE_ID: 1154
MDC_IDC_EPISODE_ID: 1155
MDC_IDC_EPISODE_ID: 1156
MDC_IDC_EPISODE_ID: 1157
MDC_IDC_EPISODE_ID: 1158
MDC_IDC_EPISODE_ID: 1159
MDC_IDC_EPISODE_ID: 1160
MDC_IDC_EPISODE_ID: 1161
MDC_IDC_EPISODE_ID: 1162
MDC_IDC_EPISODE_ID: 1163
MDC_IDC_EPISODE_ID: 1164
MDC_IDC_EPISODE_ID: 1165
MDC_IDC_EPISODE_ID: 1166
MDC_IDC_EPISODE_ID: 1167
MDC_IDC_EPISODE_TYPE: NORMAL
MDC_IDC_EPISODE_TYPE_INDUCED: NO
MDC_IDC_MSMT_BATTERY_DTM: NORMAL
MDC_IDC_MSMT_BATTERY_REMAINING_LONGEVITY: 29 MO
MDC_IDC_MSMT_BATTERY_RRT_TRIGGER: 2.83
MDC_IDC_MSMT_BATTERY_STATUS: NORMAL
MDC_IDC_MSMT_BATTERY_VOLTAGE: 2.93 V
MDC_IDC_MSMT_LEADCHNL_RA_IMPEDANCE_VALUE: 418 OHM
MDC_IDC_MSMT_LEADCHNL_RA_IMPEDANCE_VALUE: 475 OHM
MDC_IDC_MSMT_LEADCHNL_RA_PACING_THRESHOLD_AMPLITUDE: 0.5 V
MDC_IDC_MSMT_LEADCHNL_RA_PACING_THRESHOLD_PULSEWIDTH: 0.4 MS
MDC_IDC_MSMT_LEADCHNL_RA_SENSING_INTR_AMPL: 1.62 MV
MDC_IDC_MSMT_LEADCHNL_RA_SENSING_INTR_AMPL: 2.25 MV
MDC_IDC_MSMT_LEADCHNL_RV_IMPEDANCE_VALUE: 399 OHM
MDC_IDC_MSMT_LEADCHNL_RV_IMPEDANCE_VALUE: 475 OHM
MDC_IDC_MSMT_LEADCHNL_RV_PACING_THRESHOLD_AMPLITUDE: 0.88 V
MDC_IDC_MSMT_LEADCHNL_RV_PACING_THRESHOLD_PULSEWIDTH: 0.4 MS
MDC_IDC_MSMT_LEADCHNL_RV_SENSING_INTR_AMPL: 3.62 MV
MDC_IDC_MSMT_LEADCHNL_RV_SENSING_INTR_AMPL: 5.62 MV
MDC_IDC_PG_IMPLANT_DTM: NORMAL
MDC_IDC_PG_MFG: NORMAL
MDC_IDC_PG_MODEL: NORMAL
MDC_IDC_PG_SERIAL: NORMAL
MDC_IDC_PG_TYPE: NORMAL
MDC_IDC_SESS_CLINIC_NAME: NORMAL
MDC_IDC_SESS_DTM: NORMAL
MDC_IDC_SESS_TYPE: NORMAL
MDC_IDC_SET_BRADY_AT_MODE_SWITCH_RATE: 162 {BEATS}/MIN
MDC_IDC_SET_BRADY_HYSTRATE: NORMAL
MDC_IDC_SET_BRADY_LOWRATE: 50 {BEATS}/MIN
MDC_IDC_SET_BRADY_MAX_SENSOR_RATE: 130 {BEATS}/MIN
MDC_IDC_SET_BRADY_MAX_TRACKING_RATE: 130 {BEATS}/MIN
MDC_IDC_SET_BRADY_MODE: NORMAL
MDC_IDC_SET_BRADY_PAV_DELAY_LOW: 180 MS
MDC_IDC_SET_BRADY_SAV_DELAY_LOW: 150 MS
MDC_IDC_SET_LEADCHNL_RA_PACING_AMPLITUDE: 1.5 V
MDC_IDC_SET_LEADCHNL_RA_PACING_ANODE_ELECTRODE_1: NORMAL
MDC_IDC_SET_LEADCHNL_RA_PACING_ANODE_LOCATION_1: NORMAL
MDC_IDC_SET_LEADCHNL_RA_PACING_CAPTURE_MODE: NORMAL
MDC_IDC_SET_LEADCHNL_RA_PACING_CATHODE_ELECTRODE_1: NORMAL
MDC_IDC_SET_LEADCHNL_RA_PACING_CATHODE_LOCATION_1: NORMAL
MDC_IDC_SET_LEADCHNL_RA_PACING_POLARITY: NORMAL
MDC_IDC_SET_LEADCHNL_RA_PACING_PULSEWIDTH: 0.4 MS
MDC_IDC_SET_LEADCHNL_RA_SENSING_ANODE_ELECTRODE_1: NORMAL
MDC_IDC_SET_LEADCHNL_RA_SENSING_ANODE_LOCATION_1: NORMAL
MDC_IDC_SET_LEADCHNL_RA_SENSING_CATHODE_ELECTRODE_1: NORMAL
MDC_IDC_SET_LEADCHNL_RA_SENSING_CATHODE_LOCATION_1: NORMAL
MDC_IDC_SET_LEADCHNL_RA_SENSING_POLARITY: NORMAL
MDC_IDC_SET_LEADCHNL_RA_SENSING_SENSITIVITY: 0.6 MV
MDC_IDC_SET_LEADCHNL_RV_PACING_AMPLITUDE: 2 V
MDC_IDC_SET_LEADCHNL_RV_PACING_ANODE_ELECTRODE_1: NORMAL
MDC_IDC_SET_LEADCHNL_RV_PACING_ANODE_LOCATION_1: NORMAL
MDC_IDC_SET_LEADCHNL_RV_PACING_CAPTURE_MODE: NORMAL
MDC_IDC_SET_LEADCHNL_RV_PACING_CATHODE_ELECTRODE_1: NORMAL
MDC_IDC_SET_LEADCHNL_RV_PACING_CATHODE_LOCATION_1: NORMAL
MDC_IDC_SET_LEADCHNL_RV_PACING_POLARITY: NORMAL
MDC_IDC_SET_LEADCHNL_RV_PACING_PULSEWIDTH: 0.4 MS
MDC_IDC_SET_LEADCHNL_RV_SENSING_ANODE_ELECTRODE_1: NORMAL
MDC_IDC_SET_LEADCHNL_RV_SENSING_ANODE_LOCATION_1: NORMAL
MDC_IDC_SET_LEADCHNL_RV_SENSING_CATHODE_ELECTRODE_1: NORMAL
MDC_IDC_SET_LEADCHNL_RV_SENSING_CATHODE_LOCATION_1: NORMAL
MDC_IDC_SET_LEADCHNL_RV_SENSING_POLARITY: NORMAL
MDC_IDC_SET_LEADCHNL_RV_SENSING_SENSITIVITY: 0.9 MV
MDC_IDC_SET_ZONE_DETECTION_INTERVAL: 370 MS
MDC_IDC_SET_ZONE_DETECTION_INTERVAL: 400 MS
MDC_IDC_SET_ZONE_STATUS: NORMAL
MDC_IDC_SET_ZONE_STATUS: NORMAL
MDC_IDC_SET_ZONE_TYPE: NORMAL
MDC_IDC_SET_ZONE_VENDOR_TYPE: NORMAL
MDC_IDC_STAT_AT_BURDEN_PERCENT: 0 %
MDC_IDC_STAT_AT_DTM_END: NORMAL
MDC_IDC_STAT_AT_DTM_START: NORMAL
MDC_IDC_STAT_BRADY_AP_VP_PERCENT: 0 %
MDC_IDC_STAT_BRADY_AP_VS_PERCENT: 2.59 %
MDC_IDC_STAT_BRADY_AS_VP_PERCENT: 0.03 %
MDC_IDC_STAT_BRADY_AS_VS_PERCENT: 97.37 %
MDC_IDC_STAT_BRADY_DTM_END: NORMAL
MDC_IDC_STAT_BRADY_DTM_START: NORMAL
MDC_IDC_STAT_BRADY_RA_PERCENT_PACED: 2.6 %
MDC_IDC_STAT_BRADY_RV_PERCENT_PACED: 0.03 %
MDC_IDC_STAT_EPISODE_RECENT_COUNT: 0
MDC_IDC_STAT_EPISODE_RECENT_COUNT: 35
MDC_IDC_STAT_EPISODE_RECENT_COUNT_DTM_END: NORMAL
MDC_IDC_STAT_EPISODE_RECENT_COUNT_DTM_START: NORMAL
MDC_IDC_STAT_EPISODE_TOTAL_COUNT: 0
MDC_IDC_STAT_EPISODE_TOTAL_COUNT: 1160
MDC_IDC_STAT_EPISODE_TOTAL_COUNT: 2
MDC_IDC_STAT_EPISODE_TOTAL_COUNT: 4
MDC_IDC_STAT_EPISODE_TOTAL_COUNT_DTM_END: NORMAL
MDC_IDC_STAT_EPISODE_TOTAL_COUNT_DTM_START: NORMAL
MDC_IDC_STAT_EPISODE_TYPE: NORMAL
MDC_IDC_STAT_TACHYTHERAPY_RECENT_DTM_END: NORMAL
MDC_IDC_STAT_TACHYTHERAPY_RECENT_DTM_START: NORMAL
MDC_IDC_STAT_TACHYTHERAPY_TOTAL_DTM_END: NORMAL
MDC_IDC_STAT_TACHYTHERAPY_TOTAL_DTM_START: NORMAL

## 2025-05-06 PROCEDURE — 93280 PM DEVICE PROGR EVAL DUAL: CPT | Performed by: INTERNAL MEDICINE

## 2025-05-13 ENCOUNTER — TELEPHONE (OUTPATIENT)
Dept: FAMILY MEDICINE CLINIC | Age: 60
End: 2025-05-13
Payer: COMMERCIAL

## 2025-05-13 NOTE — TELEPHONE ENCOUNTER
----- Message from Jackie EASLEY sent at 4/15/2025  8:15 AM EDT -----   TICKLE to call to him in 4 weeks.  May we increase Mounjaro to 10 mg weekly?  Thanks.

## 2025-05-13 NOTE — TELEPHONE ENCOUNTER
Pt states he got the wrong dose, so has not moved up to 7.5mg dose yet, will move up to 7.5mg dose in about two weeks.

## 2025-05-13 NOTE — TELEPHONE ENCOUNTER
Noted.  Do I need to send a new prescription for the 7.5 mg dose, or does he already have it?  Either way, TICKLE to call him again in 4 weeks to see if we may increase to 10 mg weekly.  Thanks.

## 2025-05-23 ENCOUNTER — LAB SERVICES (OUTPATIENT)
Dept: LAB | Age: 60
End: 2025-05-23

## 2025-05-23 ENCOUNTER — APPOINTMENT (OUTPATIENT)
Dept: FAMILY MEDICINE | Age: 60
End: 2025-05-23

## 2025-05-23 VITALS
OXYGEN SATURATION: 98 % | WEIGHT: 206 LBS | HEART RATE: 77 BPM | SYSTOLIC BLOOD PRESSURE: 132 MMHG | TEMPERATURE: 97.5 F | HEIGHT: 65 IN | DIASTOLIC BLOOD PRESSURE: 79 MMHG | BODY MASS INDEX: 34.32 KG/M2 | RESPIRATION RATE: 17 BRPM

## 2025-05-23 DIAGNOSIS — I47.10 SVT (SUPRAVENTRICULAR TACHYCARDIA) (CMD): ICD-10-CM

## 2025-05-23 DIAGNOSIS — R97.20 ELEVATED PSA: ICD-10-CM

## 2025-05-23 DIAGNOSIS — Z13.220 LIPID SCREENING: ICD-10-CM

## 2025-05-23 DIAGNOSIS — Z00.00 GENERAL MEDICAL EXAMINATION: Primary | ICD-10-CM

## 2025-05-23 DIAGNOSIS — Z00.00 LABORATORY EXAMINATION ORDERED AS PART OF A ROUTINE GENERAL MEDICAL EXAMINATION: ICD-10-CM

## 2025-05-23 DIAGNOSIS — G47.31 CENTRAL SLEEP APNEA: ICD-10-CM

## 2025-05-23 DIAGNOSIS — I49.5 SINOATRIAL NODE DYSFUNCTION  (CMD): ICD-10-CM

## 2025-05-23 DIAGNOSIS — F51.01 PRIMARY INSOMNIA: ICD-10-CM

## 2025-05-23 PROCEDURE — 85025 COMPLETE CBC W/AUTO DIFF WBC: CPT | Performed by: CLINICAL MEDICAL LABORATORY

## 2025-05-23 PROCEDURE — 84153 ASSAY OF PSA TOTAL: CPT | Performed by: CLINICAL MEDICAL LABORATORY

## 2025-05-23 PROCEDURE — 80061 LIPID PANEL: CPT | Performed by: CLINICAL MEDICAL LABORATORY

## 2025-05-23 PROCEDURE — 36415 COLL VENOUS BLD VENIPUNCTURE: CPT | Performed by: INTERNAL MEDICINE

## 2025-05-23 PROCEDURE — 99396 PREV VISIT EST AGE 40-64: CPT | Performed by: PHYSICIAN ASSISTANT

## 2025-05-23 PROCEDURE — 80053 COMPREHEN METABOLIC PANEL: CPT | Performed by: CLINICAL MEDICAL LABORATORY

## 2025-05-23 RX ORDER — IPRATROPIUM BROMIDE 42 UG/1
1 SPRAY, METERED NASAL 3 TIMES DAILY
Qty: 15 ML | Refills: 5 | Status: SHIPPED | OUTPATIENT
Start: 2025-05-23

## 2025-05-23 RX ORDER — TADALAFIL 20 MG/1
TABLET ORAL
Qty: 30 TABLET | Refills: 11 | Status: SHIPPED | OUTPATIENT
Start: 2025-05-23

## 2025-05-23 RX ORDER — SULINDAC 200 MG/1
200 TABLET ORAL 2 TIMES DAILY
Qty: 180 TABLET | Refills: 1 | Status: SHIPPED | OUTPATIENT
Start: 2025-05-23

## 2025-05-23 ASSESSMENT — ENCOUNTER SYMPTOMS
DIARRHEA: 0
WHEEZING: 0
COUGH: 0
SLEEP DISTURBANCE: 0
FATIGUE: 0
FEVER: 0
HEADACHES: 0
VOMITING: 0
DIAPHORESIS: 0
NAUSEA: 0
SORE THROAT: 0
SHORTNESS OF BREATH: 0
CHILLS: 0
CONSTIPATION: 1
DIZZINESS: 0

## 2025-05-23 ASSESSMENT — PATIENT HEALTH QUESTIONNAIRE - PHQ9
2. FEELING DOWN, DEPRESSED OR HOPELESS: NOT AT ALL
CLINICAL INTERPRETATION OF PHQ2 SCORE: NO FURTHER SCREENING NEEDED
1. LITTLE INTEREST OR PLEASURE IN DOING THINGS: NOT AT ALL
SUM OF ALL RESPONSES TO PHQ9 QUESTIONS 1 AND 2: 0
SUM OF ALL RESPONSES TO PHQ9 QUESTIONS 1 AND 2: 0

## 2025-05-24 LAB
ALBUMIN SERPL-MCNC: 3.6 G/DL (ref 3.4–5)
ALBUMIN/GLOB SERPL: 1.3 {RATIO} (ref 1–2.4)
ALP SERPL-CCNC: 113 UNITS/L (ref 45–117)
ALT SERPL-CCNC: 23 UNITS/L
ANION GAP SERPL CALC-SCNC: 11 MMOL/L (ref 7–19)
AST SERPL-CCNC: 18 UNITS/L
BASOPHILS # BLD: 0 K/MCL (ref 0–0.3)
BASOPHILS NFR BLD: 1 %
BILIRUB SERPL-MCNC: 0.9 MG/DL (ref 0.2–1)
BUN SERPL-MCNC: 14 MG/DL (ref 6–20)
BUN/CREAT SERPL: 15 (ref 7–25)
CALCIUM SERPL-MCNC: 9.3 MG/DL (ref 8.4–10.2)
CHLORIDE SERPL-SCNC: 105 MMOL/L (ref 97–110)
CHOLEST SERPL-MCNC: 182 MG/DL
CHOLEST/HDLC SERPL: 4 {RATIO}
CO2 SERPL-SCNC: 28 MMOL/L (ref 21–32)
CREAT SERPL-MCNC: 0.93 MG/DL (ref 0.67–1.17)
DEPRECATED RDW RBC: 44.2 FL (ref 39–50)
EGFRCR SERPLBLD CKD-EPI 2021: >90 ML/MIN/{1.73_M2}
EOSINOPHIL # BLD: 0.1 K/MCL (ref 0–0.5)
EOSINOPHIL NFR BLD: 2 %
ERYTHROCYTE [DISTWIDTH] IN BLOOD: 13.2 % (ref 11–15)
FASTING DURATION TIME PATIENT: 12 HOURS (ref 0–999)
GLOBULIN SER-MCNC: 2.8 G/DL (ref 2–4)
GLUCOSE SERPL-MCNC: 94 MG/DL (ref 70–99)
HCT VFR BLD CALC: 50.1 % (ref 39–51)
HDLC SERPL-MCNC: 45 MG/DL
HGB BLD-MCNC: 16.3 G/DL (ref 13–17)
IMM GRANULOCYTES # BLD AUTO: 0 K/MCL (ref 0–0.2)
IMM GRANULOCYTES # BLD: 1 %
LDLC SERPL CALC-MCNC: 111 MG/DL
LYMPHOCYTES # BLD: 1 K/MCL (ref 1–4)
LYMPHOCYTES NFR BLD: 21 %
MCH RBC QN AUTO: 30 PG (ref 26–34)
MCHC RBC AUTO-ENTMCNC: 32.5 G/DL (ref 32–36.5)
MCV RBC AUTO: 92.3 FL (ref 78–100)
MONOCYTES # BLD: 0.6 K/MCL (ref 0.3–0.9)
MONOCYTES NFR BLD: 12 %
NEUTROPHILS # BLD: 3.3 K/MCL (ref 1.8–7.7)
NEUTROPHILS NFR BLD: 63 %
NONHDLC SERPL-MCNC: 137 MG/DL
NRBC BLD MANUAL-RTO: 0 /100 WBC
PLATELET # BLD AUTO: 221 K/MCL (ref 140–450)
POTASSIUM SERPL-SCNC: 4.5 MMOL/L (ref 3.4–5.1)
PROT SERPL-MCNC: 6.4 G/DL (ref 6.4–8.2)
PSA SERPL-MCNC: 3.62 NG/ML
RBC # BLD: 5.43 MIL/MCL (ref 4.5–5.9)
SODIUM SERPL-SCNC: 139 MMOL/L (ref 135–145)
TRIGL SERPL-MCNC: 130 MG/DL
WBC # BLD: 5.1 K/MCL (ref 4.2–11)

## 2025-05-27 ENCOUNTER — RESULTS FOLLOW-UP (OUTPATIENT)
Dept: FAMILY MEDICINE | Age: 60
End: 2025-05-27

## 2025-06-10 DIAGNOSIS — E11.65 TYPE 2 DIABETES MELLITUS WITH HYPERGLYCEMIA, WITHOUT LONG-TERM CURRENT USE OF INSULIN: ICD-10-CM

## 2025-06-10 NOTE — TELEPHONE ENCOUNTER
----- Message from Jackie EASLEY sent at 5/13/2025  1:08 PM EDT -----  TICKLE to call him again in 4 weeks to see if we may increase to 10 mg weekly.  Thanks.

## 2025-06-12 NOTE — TELEPHONE ENCOUNTER
The 10 mg dose has been sent to [facility].  Please TICKLE to call him in 4 weeks to see if we may increase to 12.5 mg weekly.  Thanks.

## 2025-06-27 DIAGNOSIS — I25.10 CORONARY ARTERY DISEASE INVOLVING NATIVE CORONARY ARTERY OF NATIVE HEART WITHOUT ANGINA PECTORIS: ICD-10-CM

## 2025-06-27 DIAGNOSIS — I10 ESSENTIAL HYPERTENSION: ICD-10-CM

## 2025-06-27 RX ORDER — METOPROLOL TARTRATE 25 MG/1
25 TABLET, FILM COATED ORAL EVERY 12 HOURS
Qty: 120 TABLET | Refills: 0 | Status: SHIPPED | OUTPATIENT
Start: 2025-06-27

## 2025-07-10 DIAGNOSIS — E11.65 TYPE 2 DIABETES MELLITUS WITH HYPERGLYCEMIA, WITHOUT LONG-TERM CURRENT USE OF INSULIN: ICD-10-CM

## 2025-07-10 NOTE — TELEPHONE ENCOUNTER
----- Message from Jackie EASLEY sent at 6/12/2025 12:25 PM EDT -----  TICKLE to call him in 4 weeks to see if we may increase to 12.5 mg weekly.  Thanks.

## 2025-07-10 NOTE — TELEPHONE ENCOUNTER
The 12.5 mg dose has been sent.  Please TICKLE to call him in 4 weeks to see if we may increase to 15 mg weekly.  Thanks.

## 2025-07-15 ENCOUNTER — APPOINTMENT (OUTPATIENT)
Dept: ELECTROPHYSIOLOGY | Age: 60
End: 2025-07-15
Attending: INTERNAL MEDICINE

## 2025-07-15 ENCOUNTER — APPOINTMENT (OUTPATIENT)
Dept: CARDIOLOGY | Age: 60
End: 2025-07-15
Attending: PHYSICIAN ASSISTANT

## 2025-07-15 VITALS
OXYGEN SATURATION: 99 % | BODY MASS INDEX: 34.16 KG/M2 | SYSTOLIC BLOOD PRESSURE: 124 MMHG | WEIGHT: 205 LBS | HEART RATE: 84 BPM | DIASTOLIC BLOOD PRESSURE: 82 MMHG | HEIGHT: 65 IN

## 2025-07-15 DIAGNOSIS — I47.10 SVT (SUPRAVENTRICULAR TACHYCARDIA) (CMD): ICD-10-CM

## 2025-07-15 DIAGNOSIS — I49.5 SINOATRIAL NODE DYSFUNCTION  (CMD): ICD-10-CM

## 2025-07-15 DIAGNOSIS — I25.10 ATHEROSCLEROSIS OF NATIVE CORONARY ARTERY OF NATIVE HEART WITHOUT ANGINA PECTORIS: Primary | ICD-10-CM

## 2025-07-15 DIAGNOSIS — E78.2 MIXED HYPERLIPIDEMIA: ICD-10-CM

## 2025-07-15 DIAGNOSIS — Z95.0 PACEMAKER: ICD-10-CM

## 2025-07-15 PROCEDURE — 99204 OFFICE O/P NEW MOD 45 MIN: CPT | Performed by: INTERNAL MEDICINE

## 2025-07-16 ENCOUNTER — E-ADVICE (OUTPATIENT)
Dept: NUCLEAR MEDICINE | Age: 60
End: 2025-07-16

## 2025-07-17 ENCOUNTER — IMAGING SERVICES (OUTPATIENT)
Age: 60
End: 2025-07-17
Attending: INTERNAL MEDICINE

## 2025-07-17 ENCOUNTER — APPOINTMENT (OUTPATIENT)
Dept: NUCLEAR MEDICINE | Age: 60
End: 2025-07-17
Attending: INTERNAL MEDICINE

## 2025-07-17 ENCOUNTER — APPOINTMENT (OUTPATIENT)
Dept: CARDIOLOGY | Age: 60
End: 2025-07-17
Attending: INTERNAL MEDICINE

## 2025-07-17 ENCOUNTER — ANCILLARY PROCEDURE (OUTPATIENT)
Dept: NUCLEAR MEDICINE | Age: 60
End: 2025-07-17
Attending: INTERNAL MEDICINE

## 2025-07-17 DIAGNOSIS — I49.5 SINOATRIAL NODE DYSFUNCTION  (CMD): ICD-10-CM

## 2025-07-17 DIAGNOSIS — I47.10 SVT (SUPRAVENTRICULAR TACHYCARDIA) (CMD): ICD-10-CM

## 2025-07-17 DIAGNOSIS — Z95.0 PACEMAKER: ICD-10-CM

## 2025-07-17 DIAGNOSIS — E78.2 MIXED HYPERLIPIDEMIA: ICD-10-CM

## 2025-07-17 DIAGNOSIS — I25.10 ATHEROSCLEROSIS OF NATIVE CORONARY ARTERY OF NATIVE HEART WITHOUT ANGINA PECTORIS: ICD-10-CM

## 2025-07-17 PROCEDURE — 93306 TTE W/DOPPLER COMPLETE: CPT | Performed by: EMERGENCY MEDICINE

## 2025-07-17 PROCEDURE — 78452 HT MUSCLE IMAGE SPECT MULT: CPT | Performed by: STUDENT IN AN ORGANIZED HEALTH CARE EDUCATION/TRAINING PROGRAM

## 2025-07-17 PROCEDURE — 93015 CV STRESS TEST SUPVJ I&R: CPT | Performed by: INTERNAL MEDICINE

## 2025-07-17 PROCEDURE — 93306 TTE W/DOPPLER COMPLETE: CPT | Performed by: INTERNAL MEDICINE

## 2025-07-17 PROCEDURE — 93356 MYOCRD STRAIN IMG SPCKL TRCK: CPT | Performed by: INTERNAL MEDICINE

## 2025-07-17 PROCEDURE — 93356 MYOCRD STRAIN IMG SPCKL TRCK: CPT | Performed by: EMERGENCY MEDICINE

## 2025-07-17 PROCEDURE — A9500 TC99M SESTAMIBI: HCPCS | Performed by: STUDENT IN AN ORGANIZED HEALTH CARE EDUCATION/TRAINING PROGRAM

## 2025-07-17 RX ORDER — TETRAKIS(2-METHOXYISOBUTYLISOCYANIDE)COPPER(I) TETRAFLUOROBORATE 1 MG/ML
7.9 INJECTION, POWDER, LYOPHILIZED, FOR SOLUTION INTRAVENOUS ONCE
Status: COMPLETED | OUTPATIENT
Start: 2025-07-17 | End: 2025-07-17

## 2025-07-17 RX ORDER — REGADENOSON 0.08 MG/ML
0.4 INJECTION, SOLUTION INTRAVENOUS ONCE
Status: COMPLETED | OUTPATIENT
Start: 2025-07-17 | End: 2025-07-17

## 2025-07-17 RX ORDER — TETRAKIS(2-METHOXYISOBUTYLISOCYANIDE)COPPER(I) TETRAFLUOROBORATE 1 MG/ML
24.1 INJECTION, POWDER, LYOPHILIZED, FOR SOLUTION INTRAVENOUS ONCE
Status: COMPLETED | OUTPATIENT
Start: 2025-07-17 | End: 2025-07-17

## 2025-07-17 RX ADMIN — TETRAKIS(2-METHOXYISOBUTYLISOCYANIDE)COPPER(I) TETRAFLUOROBORATE 24.1 MILLICURIE: 1 INJECTION, POWDER, LYOPHILIZED, FOR SOLUTION INTRAVENOUS at 13:11

## 2025-07-17 RX ADMIN — TETRAKIS(2-METHOXYISOBUTYLISOCYANIDE)COPPER(I) TETRAFLUOROBORATE 7.9 MILLICURIE: 1 INJECTION, POWDER, LYOPHILIZED, FOR SOLUTION INTRAVENOUS at 11:43

## 2025-07-17 RX ADMIN — REGADENOSON 0.4 MG: 0.08 INJECTION, SOLUTION INTRAVENOUS at 13:11

## 2025-07-18 ENCOUNTER — ANCILLARY PROCEDURE (OUTPATIENT)
Dept: ELECTROPHYSIOLOGY | Age: 60
End: 2025-07-18
Attending: INTERNAL MEDICINE

## 2025-07-18 ENCOUNTER — E-ADVICE (OUTPATIENT)
Dept: ELECTROPHYSIOLOGY | Age: 60
End: 2025-07-18

## 2025-07-18 DIAGNOSIS — Z95.0 PACEMAKER: ICD-10-CM

## 2025-07-18 DIAGNOSIS — I49.5 SINOATRIAL NODE DYSFUNCTION  (CMD): ICD-10-CM

## 2025-07-18 DIAGNOSIS — I47.10 SVT (SUPRAVENTRICULAR TACHYCARDIA) (CMD): ICD-10-CM

## 2025-07-18 DIAGNOSIS — R00.1 BRADYCARDIA: ICD-10-CM

## 2025-07-18 LAB
MDC_IDC_EPISODE_DTM: NORMAL
MDC_IDC_EPISODE_DURATION: 1 S
MDC_IDC_EPISODE_DURATION: 11 S
MDC_IDC_EPISODE_DURATION: 14 S
MDC_IDC_EPISODE_DURATION: 14 S
MDC_IDC_EPISODE_DURATION: 17 S
MDC_IDC_EPISODE_DURATION: 18 S
MDC_IDC_EPISODE_DURATION: 28 S
MDC_IDC_EPISODE_DURATION: 79 S
MDC_IDC_EPISODE_DURATION: 9 S
MDC_IDC_EPISODE_DURATION: 9 S
MDC_IDC_EPISODE_ID: 1168
MDC_IDC_EPISODE_ID: 1169
MDC_IDC_EPISODE_ID: 1170
MDC_IDC_EPISODE_ID: 1171
MDC_IDC_EPISODE_ID: 1172
MDC_IDC_EPISODE_ID: 1173
MDC_IDC_EPISODE_ID: 1174
MDC_IDC_EPISODE_ID: 1175
MDC_IDC_EPISODE_ID: 1176
MDC_IDC_EPISODE_ID: 1177
MDC_IDC_EPISODE_TYPE: NORMAL
MDC_IDC_MSMT_BATTERY_DTM: NORMAL
MDC_IDC_MSMT_BATTERY_REMAINING_LONGEVITY: 31 MO
MDC_IDC_MSMT_BATTERY_RRT_TRIGGER: 2.83
MDC_IDC_MSMT_BATTERY_STATUS: NORMAL
MDC_IDC_MSMT_BATTERY_VOLTAGE: 2.93 V
MDC_IDC_MSMT_LEADCHNL_RA_IMPEDANCE_VALUE: 437 OHM
MDC_IDC_MSMT_LEADCHNL_RA_IMPEDANCE_VALUE: 475 OHM
MDC_IDC_MSMT_LEADCHNL_RA_PACING_THRESHOLD_AMPLITUDE: 0.5 V
MDC_IDC_MSMT_LEADCHNL_RA_PACING_THRESHOLD_PULSEWIDTH: 0.4 MS
MDC_IDC_MSMT_LEADCHNL_RA_SENSING_INTR_AMPL: 2.25 MV
MDC_IDC_MSMT_LEADCHNL_RA_SENSING_INTR_AMPL: 2.25 MV
MDC_IDC_MSMT_LEADCHNL_RV_IMPEDANCE_VALUE: 399 OHM
MDC_IDC_MSMT_LEADCHNL_RV_IMPEDANCE_VALUE: 456 OHM
MDC_IDC_MSMT_LEADCHNL_RV_PACING_THRESHOLD_AMPLITUDE: 1 V
MDC_IDC_MSMT_LEADCHNL_RV_PACING_THRESHOLD_PULSEWIDTH: 0.4 MS
MDC_IDC_MSMT_LEADCHNL_RV_SENSING_INTR_AMPL: 3.5 MV
MDC_IDC_MSMT_LEADCHNL_RV_SENSING_INTR_AMPL: 3.5 MV
MDC_IDC_PG_IMPLANT_DTM: NORMAL
MDC_IDC_PG_MFG: NORMAL
MDC_IDC_PG_MODEL: NORMAL
MDC_IDC_PG_SERIAL: NORMAL
MDC_IDC_PG_TYPE: NORMAL
MDC_IDC_SESS_CLINIC_NAME: NORMAL
MDC_IDC_SESS_DTM: NORMAL
MDC_IDC_SESS_TYPE: NORMAL
MDC_IDC_SET_BRADY_AT_MODE_SWITCH_RATE: 162 {BEATS}/MIN
MDC_IDC_SET_BRADY_HYSTRATE: NORMAL
MDC_IDC_SET_BRADY_LOWRATE: 50 {BEATS}/MIN
MDC_IDC_SET_BRADY_MAX_SENSOR_RATE: 130 {BEATS}/MIN
MDC_IDC_SET_BRADY_MAX_TRACKING_RATE: 130 {BEATS}/MIN
MDC_IDC_SET_BRADY_MODE: NORMAL
MDC_IDC_SET_BRADY_PAV_DELAY_LOW: 180 MS
MDC_IDC_SET_BRADY_SAV_DELAY_LOW: 150 MS
MDC_IDC_SET_LEADCHNL_RA_PACING_AMPLITUDE: 1.5 V
MDC_IDC_SET_LEADCHNL_RA_PACING_ANODE_ELECTRODE_1: NORMAL
MDC_IDC_SET_LEADCHNL_RA_PACING_ANODE_LOCATION_1: NORMAL
MDC_IDC_SET_LEADCHNL_RA_PACING_CAPTURE_MODE: NORMAL
MDC_IDC_SET_LEADCHNL_RA_PACING_CATHODE_ELECTRODE_1: NORMAL
MDC_IDC_SET_LEADCHNL_RA_PACING_CATHODE_LOCATION_1: NORMAL
MDC_IDC_SET_LEADCHNL_RA_PACING_POLARITY: NORMAL
MDC_IDC_SET_LEADCHNL_RA_PACING_PULSEWIDTH: 0.4 MS
MDC_IDC_SET_LEADCHNL_RA_SENSING_ANODE_ELECTRODE_1: NORMAL
MDC_IDC_SET_LEADCHNL_RA_SENSING_ANODE_LOCATION_1: NORMAL
MDC_IDC_SET_LEADCHNL_RA_SENSING_CATHODE_ELECTRODE_1: NORMAL
MDC_IDC_SET_LEADCHNL_RA_SENSING_CATHODE_LOCATION_1: NORMAL
MDC_IDC_SET_LEADCHNL_RA_SENSING_POLARITY: NORMAL
MDC_IDC_SET_LEADCHNL_RA_SENSING_SENSITIVITY: 0.6 MV
MDC_IDC_SET_LEADCHNL_RV_PACING_AMPLITUDE: 2 V
MDC_IDC_SET_LEADCHNL_RV_PACING_ANODE_ELECTRODE_1: NORMAL
MDC_IDC_SET_LEADCHNL_RV_PACING_ANODE_LOCATION_1: NORMAL
MDC_IDC_SET_LEADCHNL_RV_PACING_CAPTURE_MODE: NORMAL
MDC_IDC_SET_LEADCHNL_RV_PACING_CATHODE_ELECTRODE_1: NORMAL
MDC_IDC_SET_LEADCHNL_RV_PACING_CATHODE_LOCATION_1: NORMAL
MDC_IDC_SET_LEADCHNL_RV_PACING_POLARITY: NORMAL
MDC_IDC_SET_LEADCHNL_RV_PACING_PULSEWIDTH: 0.4 MS
MDC_IDC_SET_LEADCHNL_RV_SENSING_ANODE_ELECTRODE_1: NORMAL
MDC_IDC_SET_LEADCHNL_RV_SENSING_ANODE_LOCATION_1: NORMAL
MDC_IDC_SET_LEADCHNL_RV_SENSING_CATHODE_ELECTRODE_1: NORMAL
MDC_IDC_SET_LEADCHNL_RV_SENSING_CATHODE_LOCATION_1: NORMAL
MDC_IDC_SET_LEADCHNL_RV_SENSING_POLARITY: NORMAL
MDC_IDC_SET_LEADCHNL_RV_SENSING_SENSITIVITY: 0.9 MV
MDC_IDC_SET_ZONE_DETECTION_INTERVAL: 370 MS
MDC_IDC_SET_ZONE_DETECTION_INTERVAL: 400 MS
MDC_IDC_SET_ZONE_STATUS: NORMAL
MDC_IDC_SET_ZONE_STATUS: NORMAL
MDC_IDC_SET_ZONE_TYPE: NORMAL
MDC_IDC_SET_ZONE_VENDOR_TYPE: NORMAL
MDC_IDC_STAT_AT_BURDEN_PERCENT: 0 %
MDC_IDC_STAT_AT_DTM_END: NORMAL
MDC_IDC_STAT_AT_DTM_START: NORMAL
MDC_IDC_STAT_BRADY_AP_VP_PERCENT: 0 %
MDC_IDC_STAT_BRADY_AP_VS_PERCENT: 1.82 %
MDC_IDC_STAT_BRADY_AS_VP_PERCENT: 0.03 %
MDC_IDC_STAT_BRADY_AS_VS_PERCENT: 98.14 %
MDC_IDC_STAT_BRADY_DTM_END: NORMAL
MDC_IDC_STAT_BRADY_DTM_START: NORMAL
MDC_IDC_STAT_BRADY_RA_PERCENT_PACED: 1.83 %
MDC_IDC_STAT_BRADY_RV_PERCENT_PACED: 0.04 %
MDC_IDC_STAT_EPISODE_RECENT_COUNT: 0
MDC_IDC_STAT_EPISODE_RECENT_COUNT: 0
MDC_IDC_STAT_EPISODE_RECENT_COUNT: 1
MDC_IDC_STAT_EPISODE_RECENT_COUNT: 9
MDC_IDC_STAT_EPISODE_RECENT_COUNT_DTM_END: NORMAL
MDC_IDC_STAT_EPISODE_RECENT_COUNT_DTM_START: NORMAL
MDC_IDC_STAT_EPISODE_TOTAL_COUNT: 0
MDC_IDC_STAT_EPISODE_TOTAL_COUNT: 1169
MDC_IDC_STAT_EPISODE_TOTAL_COUNT: 3
MDC_IDC_STAT_EPISODE_TOTAL_COUNT: 4
MDC_IDC_STAT_EPISODE_TOTAL_COUNT_DTM_END: NORMAL
MDC_IDC_STAT_EPISODE_TOTAL_COUNT_DTM_START: NORMAL
MDC_IDC_STAT_EPISODE_TYPE: NORMAL
MDC_IDC_STAT_TACHYTHERAPY_RECENT_DTM_END: NORMAL
MDC_IDC_STAT_TACHYTHERAPY_RECENT_DTM_START: NORMAL
MDC_IDC_STAT_TACHYTHERAPY_TOTAL_DTM_END: NORMAL
MDC_IDC_STAT_TACHYTHERAPY_TOTAL_DTM_START: NORMAL

## 2025-07-18 PROCEDURE — 93296 REM INTERROG EVL PM/IDS: CPT | Performed by: INTERNAL MEDICINE

## 2025-07-18 PROCEDURE — 93294 REM INTERROG EVL PM/LDLS PM: CPT | Performed by: INTERNAL MEDICINE

## 2025-07-21 ENCOUNTER — RESULTS FOLLOW-UP (OUTPATIENT)
Dept: CARDIOLOGY | Age: 60
End: 2025-07-21

## 2025-07-21 LAB
ASCENDING AORTA (AAD): 3.17 CM
AV PEAK GRADIENT (AVPG): 9.51 MMHG
AV VMAX SYS DOP: 1.5 M/S
AVI LVOT PEAK GRADIENT (LVOTMG): 2.55 MMHG
DOP CALC LVOT PEAK VEL (LVOTPV): 1.1 M/S
E WAVE DECELARATION TIME (MDT): 0.16 S
EST RIGHT VENT SYSTOLIC PRESSURE BY TRICUSPID REGURGITATION JET (RVSP): 30.43 MMHG
INTERVENTRICULAR SEPTUM IN END DIASTOLE (IVSD): 0.96 CM
LEFT INTERNAL DIMENSION IN SYSTOLE (LVSD): 2.28 CM
LEFT VENTRICULAR INTERNAL DIMENSION IN DIASTOLE (LVDD): 4.07 CM
LEFT VENTRICULAR POSTERIOR WALL IN END DIASTOLE (LVPW): 0.79 CM
LV EF 2D ECHO EST: 67 %
LV END SYSTOLIC LONGITUDINAL STRAIN GLOBAL (LVGS): -18 %
LVOT 2D (LVOTD): 2.04 CM
LVOT VTI (LVOTVTI): 25.5 CM
MV E TISSUE VEL LAT (MELV): 0 M/S
MV E TISSUE VEL MED (MESV): 0 M/S
MV E WAVE VEL/E TISSUE VEL MED(MSR): 10.58 UNITLESS
MV PEAK A VELOCITY (MVPAV): 0.7 M/S
MV PEAK E VELOCITY (MVPEV): 0.8 M/S
PV PEAK VELOCITY (PVPV): 1.4 M/S
RV END SYSTOLIC LONGITUDINAL STRAIN FREE WALL (RVGS): -31 %
TRICUSPID VALVE ANNULAR PEAK VELOCITY (TVAPV): 0.1 M/S
TRICUSPID VALVE PEAK REGURGITATION VELOCITY (TRPV): 2.6 M/S
TV ESTIMATED RIGHT ARTERIAL PRESSURE (RAP): 3 MMHG

## 2025-07-28 ENCOUNTER — OFFICE VISIT (OUTPATIENT)
Dept: CARDIOLOGY | Facility: CLINIC | Age: 60
End: 2025-07-28
Payer: COMMERCIAL

## 2025-07-28 VITALS
WEIGHT: 167.2 LBS | DIASTOLIC BLOOD PRESSURE: 83 MMHG | BODY MASS INDEX: 23.94 KG/M2 | HEART RATE: 73 BPM | SYSTOLIC BLOOD PRESSURE: 133 MMHG | HEIGHT: 70 IN

## 2025-07-28 DIAGNOSIS — I25.10 CORONARY ARTERY DISEASE INVOLVING NATIVE CORONARY ARTERY OF NATIVE HEART WITHOUT ANGINA PECTORIS: ICD-10-CM

## 2025-07-28 DIAGNOSIS — R07.9 CHEST PAIN, UNSPECIFIED TYPE: ICD-10-CM

## 2025-07-28 DIAGNOSIS — E78.2 MIXED HYPERLIPIDEMIA: ICD-10-CM

## 2025-07-28 DIAGNOSIS — I10 ESSENTIAL HYPERTENSION: ICD-10-CM

## 2025-07-28 DIAGNOSIS — I25.10 CORONARY ARTERY DISEASE INVOLVING NATIVE CORONARY ARTERY OF NATIVE HEART WITHOUT ANGINA PECTORIS: Primary | ICD-10-CM

## 2025-07-28 PROCEDURE — 99214 OFFICE O/P EST MOD 30 MIN: CPT | Performed by: INTERNAL MEDICINE

## 2025-07-28 RX ORDER — NITROGLYCERIN 0.4 MG/1
0.4 TABLET SUBLINGUAL
Qty: 25 TABLET | Refills: 1 | Status: SHIPPED | OUTPATIENT
Start: 2025-07-28

## 2025-07-28 NOTE — PROGRESS NOTES
Chief Complaint  Follow-up (6 MONTH/CAD S/P percutaneous coronary angioplasty/), Hypertension, and Hyperlipidemia    Subjective        Man Gill presents to [facility] CARDIOLOGY  History of present illness:    Patient states overall he is doing well.  He still notes occasional chest/abdominal pain after eating that is relieved with burping.  He was on Prilosec in the past.  He notes no exertional chest pain.  He denies any palpitations or edema.  He is lost 23 pounds on the weight loss injection.      Past Medical History:   Diagnosis Date    Coronary artery disease     Essential hypertension     Gout     Kidney stones     Mixed hyperlipidemia     STEMI (ST elevation myocardial infarction)     23 3 STENTS PLACED    Type 2 diabetes mellitus     AVERAGE 'S         Past Surgical History:   Procedure Laterality Date    CARDIAC CATHETERIZATION N/A 2023    Procedure: Left Heart Cath;  Surgeon: Jin Harrison MD;  Location: [facility] CATH INVASIVE LOCATION;  Service: Cardiology;  Laterality: N/A;    CARDIAC CATHETERIZATION N/A 2023    Procedure: Percutaneous Coronary Intervention;  Surgeon: Jin Harrison MD;  Location: [facility] CATH INVASIVE LOCATION;  Service: Cardiology;  Laterality: N/A;    CARDIAC CATHETERIZATION N/A 2023    Procedure: Left Heart Cath with possible coronary angioplasty;  Surgeon: Jin Harrison MD;  Location: [facility] CATH INVASIVE LOCATION;  Service: Cardiology;  Laterality: N/A;    COLONOSCOPY  11/15/2013    Normal          Social History     Socioeconomic History    Marital status:      Spouse name: Mariely    Number of children: 1   Tobacco Use    Smoking status: Former     Current packs/day: 0.00     Average packs/day: 1 pack/day for 20.0 years (20.0 ttl pk-yrs)     Types: Cigarettes     Start date: 1976     Quit date: 1996     Years since quittin.5     Passive exposure: Current    Smokeless tobacco: Never  "  Vaping Use    Vaping status: Never Used   Substance and Sexual Activity    Alcohol use: Not Currently    Drug use: Never    Sexual activity: Defer         Family History   Problem Relation Age of Onset    Hypertension Mother     Coronary artery disease Father     Hypertension Father     Heart disease Father     Coronary artery disease Brother           No Known Allergies         Current Outpatient Medications:     Accu-Chek FastClix Lancets misc, Check blood sugar once daily., Disp: 100 each, Rfl: 3    albuterol sulfate  (90 Base) MCG/ACT inhaler, Inhale 2 puffs Every 4 (Four) Hours As Needed for Wheezing or Shortness of Air (cough)., Disp: 18 g, Rfl: 2    allopurinol (ZYLOPRIM) 100 MG tablet, Take 2 tablets by mouth Daily., Disp: 180 tablet, Rfl: 3    aspirin 81 MG EC tablet, Take 1 tablet by mouth Daily., Disp: , Rfl:     glucose blood (Accu-Chek Guide) test strip, Check blood sugar once daily., Disp: 100 each, Rfl: 3    losartan (COZAAR) 50 MG tablet, Take 1 tablet by mouth Daily., Disp: 90 tablet, Rfl: 3    metFORMIN ER (GLUCOPHAGE-XR) 500 MG 24 hr tablet, Take 2 tablets by mouth Daily With Dinner., Disp: 180 tablet, Rfl: 3    metoprolol tartrate (LOPRESSOR) 25 MG tablet, TAKE ONE TABLET BY MOUTH EVERY TWELVE HOURS, Disp: 120 tablet, Rfl: 0    rosuvastatin (CRESTOR) 20 MG tablet, Take 1 tablet by mouth Every Night., Disp: 90 tablet, Rfl: 3    Tirzepatide 12.5 MG/0.5ML solution auto-injector, Inject 0.5 mL under the skin into the appropriate area as directed 1 (One) Time Per Week., Disp: 2 mL, Rfl: 0    nitroglycerin (NITROSTAT) 0.4 MG SL tablet, Place 1 tablet under the tongue Every 5 (Five) Minutes As Needed for Chest Pain. Take no more than 3 doses in 15 minutes., Disp: 25 tablet, Rfl: 1      ROS:  Cardiac review of systems positive for atypical chest pain.    Objective     /83 (BP Location: Left arm, Patient Position: Sitting, Cuff Size: Adult)   Pulse 73   Ht 177.8 cm (70\")   Wt 75.8 kg " (167 lb 3.2 oz)   BMI 23.99 kg/m²       General Appearance:   well developed  well nourished  HENT:   oropharynx moist  lips not cyanotic  Respiratory:  no respiratory distress  normal breath sounds  no rales  Cardiovascular:  no jugular venous distention  regular rhythm  S1 normal, S2 normal  no S3, no S4   no murmur  no rub, no thrill  No carotid bruit  pedal pulses normal  lower extremity edema: none    Musculoskeletal:  no clubbing of fingers.   normocephalic, head atraumatic  Skin:   warm, dry  Psychiatric:  judgement and insight appropriate  normal mood and affect    ECHO:  Results for orders placed during the hospital encounter of 23    Adult Transthoracic Echo Complete W/ Cont if Necessary Per Protocol    Interpretation Summary    Left ventricular ejection fraction appears to be 51 - 55%.  Inferior wall appears to be moving normally.    Left ventricular wall thickness is consistent with borderline concentric hypertrophy.    Left ventricular diastolic function is consistent with (grade I) impaired relaxation.    No significant valvular disease.    STRESS:    CATH:  Results for orders placed during the hospital encounter of 23    Cardiac Catheterization/Vascular Study    Conclusion  [facility]  CARDIAC CATHETERIZATION PROCEDURE REPORT    Patient: Man Gill  : 1965  MRN: [id number]  Procedure Date: 23    Referring Physician:  Vadim Mishra M.D.    Interventional Cardiologist:  Jin Harrison MD    Indication:  Known borderline proximal to mid LAD stenosis.      Clinical Presentation:  Patient is a 57-year-old male who presented several weeks ago with inferior STEMI and received 3 drug-eluting stents to the RCA.  He was found to have a borderline proximal to mid LAD stenosis and was brought back for a staged procedure with IFR.    Procedure performed:  Right radial arterial sheath placement  Selective coronary Angiography  IFR to the proximal to mid LAD that came  back as 0.91-0.92.  Moderate conscious sedation  TR Terumo band device placement for hemostasis    Details of the procedure:  Informed consent was obtained with an explanation of the risks, benefits and alternatives of the procedure. The patient was brought to the Cardiac Catheterization Laboratory in a fasting state.  The patient was prepped and draped in a standard, sterile fashion. Moderate conscious sedation with Fentanyl and Versed was administered by the circulating nurse. Lidocaine 2% was used to anesthetize the right radial artery and a 5/6 [place] glidesheath was placed via modified Seldinger technique.  TIG catheter was used to selectively engage both coronary arteries.  Multiple scintigraphic views were obtained.      Findings:  1. Coronary Artery Anatomy:  Dominance: Right  Left Main: Moderate size vessel gives off LAD and circumflex.  No angiographic disease.  Left Anterior Descending: Moderate size vessel and then bifurcates at the distal segment.  There is a 50 to 60% proximal to mid tubular narrowing.  This was IFR negative at 0.91-0.92.  Left Circumflex Artery: Mild diffuse disease  Right Coronary Artery: Patent stents with distal mild diffuse disease.      4. Percutaneous Intervention:  Location: Proximal to mid LAD  Treatment: IFR that came back 0.91-0.92.  Pre-stenosis: 50-60%  Post-stenosis: 50-60%  VIRIDIANA Flow Pre: 3  VIRIDIANA Flow Post: 3  Bifurcation: No  Severe Calcium: No  Dissection: No            Details of angioplasty:    Heparin was used for anticoagulation throughout the procedure with frequent checking of ACT.  A XB LAD 3.5 guide catheter was used to selectively cannulate the left main coronary artery.  A IFR wire was used to selectively cannulate the LAD with mild manipulation but no major difficulty.  iFR was performed across the proximal to mid LAD tubular narrowing that came back 0.91-0.92.    At the end of the procedure, the right radial arterial sheath was removed and a TR band was  applied for hemostasis.  Adequate hemostasis was achieved.  Patient tolerated procedure well without any complications.  The results of the test were explained in detail to the patient.    Cumulative fluoroscopy time: 15 min    Cumulative air kerma: 562 mGy    Total amount of contrast used: See report ml of Isovue      Complications:  None.    Estimated Blood Loss:  Minimal.    Conclusions:  Patent stents in the RCA.  IFR negative proximal to mid tubular narrowing which is probably in the 50 to 60% range.  iFR came back 0.91-0.92.  No significant left circumflex stenosis.    Recommendations:  Continue medical management with aspirin, Brilinta and Crestor.  Goal-directed medical therapy.  Risk factor reduction.    Jin Harrison MD    07/07/23  11:28 EDT    BMP:     Glucose   Date Value Ref Range Status   12/26/2024 378 (H) 65 - 99 mg/dL Final     BUN   Date Value Ref Range Status   12/26/2024 13 8 - 23 mg/dL Final     Creatinine   Date Value Ref Range Status   12/26/2024 1.10 0.76 - 1.27 mg/dL Final     Sodium   Date Value Ref Range Status   12/26/2024 138 136 - 145 mmol/L Final     Potassium   Date Value Ref Range Status   12/26/2024 4.5 3.5 - 5.2 mmol/L Final     Chloride   Date Value Ref Range Status   12/26/2024 99 98 - 107 mmol/L Final     CO2   Date Value Ref Range Status   12/26/2024 26.7 22.0 - 29.0 mmol/L Final     Calcium   Date Value Ref Range Status   12/26/2024 8.8 8.6 - 10.5 mg/dL Final     BUN/Creatinine Ratio   Date Value Ref Range Status   12/26/2024 11.8 7.0 - 25.0 Final     Anion Gap   Date Value Ref Range Status   12/26/2024 12.3 5.0 - 15.0 mmol/L Final     eGFR   Date Value Ref Range Status   12/26/2024 76.4 >60.0 mL/min/1.73 Final     LIPIDS:  Total Cholesterol   Date Value Ref Range Status   06/13/2024 106 0 - 200 mg/dL Final     Triglycerides   Date Value Ref Range Status   06/13/2024 235 (H) 0 - 150 mg/dL Final     HDL Cholesterol   Date Value Ref Range Status   06/13/2024 35 (L) 40 - 60  mg/dL Final     LDL Cholesterol    Date Value Ref Range Status   06/13/2024 35 0 - 100 mg/dL Final     VLDL Cholesterol   Date Value Ref Range Status   06/13/2024 36 5 - 40 mg/dL Final     LDL/HDL Ratio   Date Value Ref Range Status   06/13/2024 0.69  Final         Procedures             ASSESSMENT:  Diagnoses and all orders for this visit:    1. Coronary artery disease involving native coronary artery of native heart without angina pectoris (Primary)    2. Mixed hyperlipidemia    3. Essential hypertension    4. Chest pain, unspecified type         PLAN:    1.  Patient's chest pain remains atypical.  I do think it has a GI source.  I told him he could consider going back on Prilosec, Zantac or Pepcid.  2.  Continue the aspirin.  The patient notes no problems with bleeding.  3.  Blood pressures under good control.  4.  Continue the Crestor.  Patient's cholesterols been under excellent control and he has not monitored by his primary care.  His LDL was 35 6/13/2024.  5.  Encouraged the patient to continue to remain active and call us if any exertional chest pain.  He overall appears to be doing very well from a heart standpoint and his modifiable risk factors are under excellent control.  He does not smoke.      No follow-ups on file.     Patient was given instructions and counseling regarding his condition or for health maintenance advice. Please see specific information pulled into the AVS if appropriate.         Jin Harrison MD   7/28/2025  09:03 EDT

## 2025-08-07 ENCOUNTER — TELEPHONE (OUTPATIENT)
Dept: FAMILY MEDICINE CLINIC | Age: 60
End: 2025-08-07
Payer: COMMERCIAL

## 2025-08-07 DIAGNOSIS — E11.65 TYPE 2 DIABETES MELLITUS WITH HYPERGLYCEMIA, WITHOUT LONG-TERM CURRENT USE OF INSULIN: Primary | ICD-10-CM

## 2025-08-11 DIAGNOSIS — I10 ESSENTIAL HYPERTENSION: ICD-10-CM

## 2025-08-11 DIAGNOSIS — I25.10 CORONARY ARTERY DISEASE INVOLVING NATIVE CORONARY ARTERY OF NATIVE HEART WITHOUT ANGINA PECTORIS: ICD-10-CM

## 2025-08-11 RX ORDER — METOPROLOL TARTRATE 25 MG/1
25 TABLET, FILM COATED ORAL EVERY 12 HOURS
Qty: 120 TABLET | Refills: 0 | Status: SHIPPED | OUTPATIENT
Start: 2025-08-11

## 2025-08-22 RX ORDER — FLUTICASONE PROPIONATE 50 MCG
2 SPRAY, SUSPENSION (ML) NASAL DAILY
Qty: 48 ML | Refills: 11 | Status: SHIPPED | OUTPATIENT
Start: 2025-08-22

## 2025-08-27 ENCOUNTER — APPOINTMENT (OUTPATIENT)
Dept: CARDIOLOGY | Age: 60
End: 2025-08-27

## 2025-08-27 VITALS
BODY MASS INDEX: 34.82 KG/M2 | HEART RATE: 88 BPM | SYSTOLIC BLOOD PRESSURE: 110 MMHG | OXYGEN SATURATION: 96 % | HEIGHT: 65 IN | DIASTOLIC BLOOD PRESSURE: 80 MMHG | WEIGHT: 209 LBS

## 2025-08-27 DIAGNOSIS — I25.10 ATHEROSCLEROSIS OF NATIVE CORONARY ARTERY OF NATIVE HEART WITHOUT ANGINA PECTORIS: Primary | ICD-10-CM

## 2025-08-27 PROCEDURE — 99214 OFFICE O/P EST MOD 30 MIN: CPT | Performed by: INTERNAL MEDICINE

## 2025-11-06 ENCOUNTER — APPOINTMENT (OUTPATIENT)
Dept: ELECTROPHYSIOLOGY | Age: 60
End: 2025-11-06
Attending: INTERNAL MEDICINE

## 2025-11-25 ENCOUNTER — APPOINTMENT (OUTPATIENT)
Dept: FAMILY MEDICINE | Age: 60
End: 2025-11-25

## 2026-02-24 ENCOUNTER — APPOINTMENT (OUTPATIENT)
Dept: UROLOGY | Age: 61
End: 2026-02-24

## 2026-05-05 ENCOUNTER — APPOINTMENT (OUTPATIENT)
Dept: ELECTROPHYSIOLOGY | Age: 61
End: 2026-05-05
Attending: INTERNAL MEDICINE

## 2026-09-04 ENCOUNTER — APPOINTMENT (OUTPATIENT)
Dept: CARDIOLOGY | Age: 61
End: 2026-09-04

## (undated) DEVICE — CATH F6 ST PIG 155 110CM 6SH: Brand: SUPER TORQUE

## (undated) DEVICE — HI-TORQUE BALANCE MIDDLEWEIGHT UNIVERSAL GUIDE WIRE .014 STRAIGHT TIP 3.0 CM X 190 CM: Brand: HI-TORQUE BALANCE MIDDLEWEIGHT UNIVERSAL

## (undated) DEVICE — NC TREK NEO™ CORONARY DILATATION CATHETER 3.50 MM X 15 MM / RAPID-EXCHANGE: Brand: NC TREK NEO™

## (undated) DEVICE — XIENCE SKYPOINT™ EVEROLIMUS ELUTING CORONARY STENT SYSTEM 3.00 MM X 23 MM / RAPID-EXCHANGE
Type: IMPLANTABLE DEVICE | Status: NON-FUNCTIONAL
Brand: XIENCE SKYPOINT™

## (undated) DEVICE — GW FC FLOP/TP .035 260CM 3MM

## (undated) DEVICE — GLIDESHEATH SLENDER STAINLESS STEEL KIT: Brand: GLIDESHEATH SLENDER

## (undated) DEVICE — NC TREK NEO™ CORONARY DILATATION CATHETER 2.00 MM X 15 MM / RAPID-EXCHANGE: Brand: NC TREK NEO™

## (undated) DEVICE — GUIDELINER CATHETERS ARE INTENDED TO BE USED IN CONJUNCTION WITH GUIDE CATHETERS TO ACCESS DISCRETE REGIONS OF THE CORONARY AND/OR PERIPHERAL VASCULATURE, AND TO FACILITATE PLACEMENT OF INTERVENTIONAL DEVICES.: Brand: GUIDELINER® V3 CATHETER

## (undated) DEVICE — PTCA DILATATION CATHETER: Brand: NC QUANTUM APEX™

## (undated) DEVICE — HI-TORQUE PILOT 50 GUIDE WIRE .014 J TIP 3.0 CM X 190 CM: Brand: HI-TORQUE PILOT

## (undated) DEVICE — ANGIO-SEAL VIP VASCULAR CLOSURE DEVICE: Brand: ANGIO-SEAL

## (undated) DEVICE — 6F .070 JL4 100CM: Brand: CORDIS

## (undated) DEVICE — 6F .070 JR 4 100CM: Brand: CORDIS

## (undated) DEVICE — RADIFOCUS OPTITORQUE ANGIOGRAPHIC CATHETER: Brand: OPTITORQUE

## (undated) DEVICE — NC TREK NEO™ CORONARY DILATATION CATHETER 2.00 MM X 12 MM / RAPID-EXCHANGE: Brand: NC TREK NEO™

## (undated) DEVICE — CATH IMG IVUS EAGLE EYE PLATIN RX DIGITAL .014IN 5FR